# Patient Record
Sex: MALE | Race: WHITE | Employment: OTHER | URBAN - METROPOLITAN AREA
[De-identification: names, ages, dates, MRNs, and addresses within clinical notes are randomized per-mention and may not be internally consistent; named-entity substitution may affect disease eponyms.]

---

## 2018-09-17 ENCOUNTER — HOSPITAL ENCOUNTER (OUTPATIENT)
Age: 58
Setting detail: SPECIMEN
Discharge: HOME | End: 2018-09-17
Payer: COMMERCIAL

## 2018-09-17 LAB
ABSOLUTE EOS #: 0.85 K/UL (ref 0–0.44)
ABSOLUTE IMMATURE GRANULOCYTE: 0.05 K/UL (ref 0–0.3)
ABSOLUTE LYMPH #: 1.53 K/UL (ref 1.1–3.7)
ABSOLUTE MONO #: 1.05 K/UL (ref 0.1–1.2)
ANION GAP SERPL CALCULATED.3IONS-SCNC: 12 MMOL/L (ref 9–17)
BASOPHILS # BLD: 1 % (ref 0–2)
BASOPHILS ABSOLUTE: 0.11 K/UL (ref 0–0.2)
BUN BLDV-MCNC: 6 MG/DL (ref 6–20)
BUN/CREAT BLD: ABNORMAL (ref 9–20)
CALCIUM SERPL-MCNC: 8.9 MG/DL (ref 8.6–10.4)
CHLORIDE BLD-SCNC: 104 MMOL/L (ref 98–107)
CO2: 24 MMOL/L (ref 20–31)
CREAT SERPL-MCNC: 0.61 MG/DL (ref 0.7–1.2)
DIFFERENTIAL TYPE: ABNORMAL
EOSINOPHILS RELATIVE PERCENT: 9 % (ref 1–4)
GFR AFRICAN AMERICAN: >60 ML/MIN
GFR NON-AFRICAN AMERICAN: >60 ML/MIN
GFR SERPL CREATININE-BSD FRML MDRD: ABNORMAL ML/MIN/{1.73_M2}
GFR SERPL CREATININE-BSD FRML MDRD: ABNORMAL ML/MIN/{1.73_M2}
GLUCOSE BLD-MCNC: 73 MG/DL (ref 70–99)
HCT VFR BLD CALC: 31.2 % (ref 40.7–50.3)
HEMOGLOBIN: 9.4 G/DL (ref 13–17)
IMMATURE GRANULOCYTES: 1 %
LYMPHOCYTES # BLD: 16 % (ref 24–43)
MCH RBC QN AUTO: 30.2 PG (ref 25.2–33.5)
MCHC RBC AUTO-ENTMCNC: 30.1 G/DL (ref 28.4–34.8)
MCV RBC AUTO: 100.3 FL (ref 82.6–102.9)
MONOCYTES # BLD: 11 % (ref 3–12)
NRBC AUTOMATED: 0 PER 100 WBC
PDW BLD-RTO: 18.7 % (ref 11.8–14.4)
PLATELET # BLD: 569 K/UL (ref 138–453)
PLATELET ESTIMATE: ABNORMAL
PMV BLD AUTO: 10.8 FL (ref 8.1–13.5)
POTASSIUM SERPL-SCNC: 4.3 MMOL/L (ref 3.7–5.3)
RBC # BLD: 3.11 M/UL (ref 4.21–5.77)
RBC # BLD: ABNORMAL 10*6/UL
SEDIMENTATION RATE, ERYTHROCYTE: 58 MM (ref 0–10)
SEG NEUTROPHILS: 62 % (ref 36–65)
SEGMENTED NEUTROPHILS ABSOLUTE COUNT: 5.98 K/UL (ref 1.5–8.1)
SODIUM BLD-SCNC: 140 MMOL/L (ref 135–144)
WBC # BLD: 9.6 K/UL (ref 3.5–11.3)
WBC # BLD: ABNORMAL 10*3/UL

## 2018-09-17 PROCEDURE — 85651 RBC SED RATE NONAUTOMATED: CPT

## 2018-09-17 PROCEDURE — 86140 C-REACTIVE PROTEIN: CPT

## 2018-09-17 PROCEDURE — 36415 COLL VENOUS BLD VENIPUNCTURE: CPT

## 2018-09-17 PROCEDURE — 85025 COMPLETE CBC W/AUTO DIFF WBC: CPT

## 2018-09-17 PROCEDURE — 80048 BASIC METABOLIC PNL TOTAL CA: CPT

## 2018-09-18 LAB — C-REACTIVE PROTEIN: 39.9 MG/L (ref 0–5)

## 2019-06-28 ENCOUNTER — OFFICE VISIT (OUTPATIENT)
Dept: PODIATRY | Age: 59
End: 2019-06-28
Payer: COMMERCIAL

## 2019-06-28 VITALS
WEIGHT: 148 LBS | DIASTOLIC BLOOD PRESSURE: 81 MMHG | HEIGHT: 66 IN | HEART RATE: 81 BPM | BODY MASS INDEX: 23.78 KG/M2 | SYSTOLIC BLOOD PRESSURE: 138 MMHG

## 2019-06-28 DIAGNOSIS — M77.41 METATARSALGIA OF BOTH FEET: ICD-10-CM

## 2019-06-28 DIAGNOSIS — I73.9 PERIPHERAL VASCULAR DISORDER (HCC): ICD-10-CM

## 2019-06-28 DIAGNOSIS — M79.672 PAIN IN BOTH FEET: ICD-10-CM

## 2019-06-28 DIAGNOSIS — M77.42 METATARSALGIA OF BOTH FEET: ICD-10-CM

## 2019-06-28 DIAGNOSIS — B35.1 ONYCHOMYCOSIS: ICD-10-CM

## 2019-06-28 DIAGNOSIS — L84 CALLUS OF FOOT: Primary | ICD-10-CM

## 2019-06-28 DIAGNOSIS — M79.671 PAIN IN BOTH FEET: ICD-10-CM

## 2019-06-28 PROCEDURE — 3017F COLORECTAL CA SCREEN DOC REV: CPT | Performed by: STUDENT IN AN ORGANIZED HEALTH CARE EDUCATION/TRAINING PROGRAM

## 2019-06-28 PROCEDURE — 99203 OFFICE O/P NEW LOW 30 MIN: CPT | Performed by: STUDENT IN AN ORGANIZED HEALTH CARE EDUCATION/TRAINING PROGRAM

## 2019-06-28 PROCEDURE — 11056 PARNG/CUTG B9 HYPRKR LES 2-4: CPT | Performed by: STUDENT IN AN ORGANIZED HEALTH CARE EDUCATION/TRAINING PROGRAM

## 2019-06-28 PROCEDURE — 11721 DEBRIDE NAIL 6 OR MORE: CPT | Performed by: STUDENT IN AN ORGANIZED HEALTH CARE EDUCATION/TRAINING PROGRAM

## 2019-06-28 PROCEDURE — G8420 CALC BMI NORM PARAMETERS: HCPCS | Performed by: STUDENT IN AN ORGANIZED HEALTH CARE EDUCATION/TRAINING PROGRAM

## 2019-06-28 PROCEDURE — 4004F PT TOBACCO SCREEN RCVD TLK: CPT | Performed by: STUDENT IN AN ORGANIZED HEALTH CARE EDUCATION/TRAINING PROGRAM

## 2019-06-28 PROCEDURE — 99202 OFFICE O/P NEW SF 15 MIN: CPT | Performed by: STUDENT IN AN ORGANIZED HEALTH CARE EDUCATION/TRAINING PROGRAM

## 2019-06-28 PROCEDURE — G8427 DOCREV CUR MEDS BY ELIG CLIN: HCPCS | Performed by: STUDENT IN AN ORGANIZED HEALTH CARE EDUCATION/TRAINING PROGRAM

## 2019-06-28 RX ORDER — FERROUS SULFATE TAB EC 324 MG (65 MG FE EQUIVALENT) 324 (65 FE) MG
TABLET DELAYED RESPONSE ORAL
COMMUNITY
End: 2020-10-23

## 2019-06-28 RX ORDER — ATORVASTATIN CALCIUM 80 MG/1
1 TABLET, FILM COATED ORAL
COMMUNITY
Start: 2017-08-28

## 2019-06-28 RX ORDER — ASPIRIN 81 MG/1
81 TABLET ORAL
COMMUNITY
End: 2020-10-23

## 2019-06-28 RX ORDER — PLANT STANOL ESTER 450 MG
TABLET ORAL
COMMUNITY

## 2019-06-28 RX ORDER — ALBUTEROL SULFATE 90 UG/1
AEROSOL, METERED RESPIRATORY (INHALATION)
COMMUNITY
Start: 2017-08-25 | End: 2020-10-23

## 2019-06-28 RX ORDER — APIXABAN 5 MG/1
TABLET, FILM COATED ORAL
Refills: 1 | COMMUNITY
Start: 2019-05-01

## 2019-06-28 NOTE — PROGRESS NOTES
[address]  [address] [place] [address],  RODOLFO Gutiérrez  Tel: [phone]   Fax: [phone]    Subjective     CC: Right foot pain    HPI:  Brandon Plaza is a 60y.o. year old male who presents to clinic today for right foot pain. Patient states the pain has been around since November and the pain has been getting worse. Patient reports the calluses present on the bottom of the foot have been getting larger. He has been applying a pad to the bottom of his foot over the main callus to relieve pressure but it moves around in his shoes and hasn't been very helpful. Patient has stopped smoking as of a year ago. Previous history of bypass in left leg in November 2018 to restore blood flow. Primary care physician is No primary care provider on file. .    ROS:    Constitutional: Denies nausea, vomiting, fever, chills. Neurologic: Denies numbness, tingling, and burning in the feet. Vascular: Denies symptoms of lower extremity claudication. Skin: Denies open wounds. Otherwise negative except as noted in the HPI. PMH:  No past medical history on file. Surgical History:   No past surgical history on file. Social History:  Social History     Tobacco Use    Smoking status: Not on file   Substance Use Topics    Alcohol use: Not on file    Drug use: Not on file       Medications:  Prior to Admission medications    Medication Sig Start Date End Date Taking?  Authorizing Provider   aspirin 81 MG EC tablet Take 81 mg by mouth   Yes Historical Provider, MD   albuterol sulfate HFA (PROAIR HFA) 108 (90 Base) MCG/ACT inhaler ProAir HFA 90 mcg/actuation aerosol inhaler 8/25/17  Yes Historical Provider, MD CHINCHILLA 5 MG TABS tablet  5/1/19  Yes Historical Provider, MD   ferrous sulfate 324 (65 Fe) MG EC tablet ferrous sulfate 325 mg (65 mg iron) tablet   Yes Historical Provider, MD   potassium gluconate 550 (90 K) MG TABS tablet potassium gluconate 550 mg (90 mg) tablet   Take 1 tablet every day by oral route. Yes Historical Provider, MD   atorvastatin (LIPITOR) 80 MG tablet 1 tablet 8/28/17  Yes Historical Provider, MD   metoprolol tartrate (LOPRESSOR) 25 MG tablet Take by mouth   Yes Historical Provider, MD       Objective     Vitals:    06/28/19 1459   BP: 138/81   Pulse: 81       No results found for: LABA1C    Physical Exam:  General:  Alert and oriented x3. In no acute distress. Lower Extremity Physical Exam:    Vascular: DP and PT pulses are 2/4 palpable on the left foot and 1/4 on the right foot with dopler, Bilateral. CFT <3 seconds to all digits, Bilateral.  No edema, Bilateral.  Hair growth is absent to the level of the digits, Bilateral.     Neuro: Saph/sural/SP/DP/plantar sensation intact to light touch. Protective sensation is intact to 9/10 sites as tested with a 5.07g SWMF, Bilateral.     Musculoskeletal: EHL/FHL/GS/TA gross motor intact  No tenderness on palpation. Gross deformity is absent, Bilateral.  Muscle strength is 5/5 in all 4 compartments bilaterally. Dermatologic: No open lesions, Bilateral.  Interdigital maceration absent, Bilateral.  Nails 1-10 are thickened and dystrophic. Hyperkeratosis present on lateral plantar aspect of distal hallux of right foot. Hyperkeratosis also present at plantar aspect of medial 1st MPJ of left foot. Biomechanical Exam:    Right foot: 1st MTPJ: Loaded:30 Unloaded:45  Right foot: 1st Ray: 8      Right foot: Ankle DF knee extended: 6  Right foot: Ankle DF knee flexed: 8    Left foot: 1st MTPJ: Loaded: 35 Unloaded: 45  Left foot: 1st Ray: 10  Left foot: Ankle DF knee extended: 6  Left foot: Ankle DF knee flexed: 8    Gait Analysis: Early heel lift with abductory twist, bilateral    Assessment   Albert Mills is a 60 y.o. male with     Diagnosis Orders   1. Callus of foot  silver nitrate applicators applicator 1 each    silver nitrate applicators applicator 1 each    TRIM BENIGN HYPERKERATOTIC SKIN LESION,2-4   2.

## 2019-07-12 ENCOUNTER — OFFICE VISIT (OUTPATIENT)
Dept: PODIATRY | Age: 59
End: 2019-07-12
Payer: COMMERCIAL

## 2019-07-12 VITALS
WEIGHT: 148.8 LBS | HEIGHT: 67 IN | DIASTOLIC BLOOD PRESSURE: 77 MMHG | RESPIRATION RATE: 18 BRPM | SYSTOLIC BLOOD PRESSURE: 138 MMHG | HEART RATE: 89 BPM | BODY MASS INDEX: 23.35 KG/M2

## 2019-07-12 DIAGNOSIS — M77.42 METATARSALGIA OF BOTH FEET: ICD-10-CM

## 2019-07-12 DIAGNOSIS — B35.1 ONYCHOMYCOSIS: ICD-10-CM

## 2019-07-12 DIAGNOSIS — M77.41 METATARSALGIA OF BOTH FEET: ICD-10-CM

## 2019-07-12 DIAGNOSIS — M79.672 PAIN IN BOTH FEET: ICD-10-CM

## 2019-07-12 DIAGNOSIS — M79.671 PAIN IN BOTH FEET: ICD-10-CM

## 2019-07-12 DIAGNOSIS — L84 CALLUS OF FOOT: Primary | ICD-10-CM

## 2019-07-12 DIAGNOSIS — I73.9 PERIPHERAL VASCULAR DISORDER (HCC): ICD-10-CM

## 2019-07-12 PROCEDURE — G8428 CUR MEDS NOT DOCUMENT: HCPCS | Performed by: STUDENT IN AN ORGANIZED HEALTH CARE EDUCATION/TRAINING PROGRAM

## 2019-07-12 PROCEDURE — 1036F TOBACCO NON-USER: CPT | Performed by: STUDENT IN AN ORGANIZED HEALTH CARE EDUCATION/TRAINING PROGRAM

## 2019-07-12 PROCEDURE — 99212 OFFICE O/P EST SF 10 MIN: CPT | Performed by: STUDENT IN AN ORGANIZED HEALTH CARE EDUCATION/TRAINING PROGRAM

## 2019-07-12 PROCEDURE — 99213 OFFICE O/P EST LOW 20 MIN: CPT | Performed by: STUDENT IN AN ORGANIZED HEALTH CARE EDUCATION/TRAINING PROGRAM

## 2019-07-12 PROCEDURE — G8420 CALC BMI NORM PARAMETERS: HCPCS | Performed by: STUDENT IN AN ORGANIZED HEALTH CARE EDUCATION/TRAINING PROGRAM

## 2019-07-12 PROCEDURE — 3017F COLORECTAL CA SCREEN DOC REV: CPT | Performed by: STUDENT IN AN ORGANIZED HEALTH CARE EDUCATION/TRAINING PROGRAM

## 2019-07-12 PROCEDURE — 11056 PARNG/CUTG B9 HYPRKR LES 2-4: CPT | Performed by: STUDENT IN AN ORGANIZED HEALTH CARE EDUCATION/TRAINING PROGRAM

## 2019-07-12 RX ORDER — CLOBETASOL PROPIONATE 0.46 MG/ML
SOLUTION TOPICAL
COMMUNITY
End: 2019-10-11 | Stop reason: SDUPTHER

## 2019-07-12 RX ORDER — PNV NO.95/FERROUS FUM/FOLIC AC 28MG-0.8MG
TABLET ORAL
Refills: 3 | COMMUNITY
Start: 2019-05-30

## 2019-07-12 RX ORDER — NAPROXEN 500 MG/1
TABLET ORAL
COMMUNITY
Start: 2017-07-08

## 2019-07-12 RX ORDER — CLOBETASOL PROPIONATE 0.46 MG/ML
SOLUTION TOPICAL
Refills: 4 | COMMUNITY
Start: 2019-07-09 | End: 2019-10-11 | Stop reason: SDUPTHER

## 2019-07-12 RX ORDER — PANTOPRAZOLE SODIUM 40 MG/1
TABLET, DELAYED RELEASE ORAL
Refills: 3 | COMMUNITY
Start: 2019-04-23

## 2019-07-12 RX ORDER — METRONIDAZOLE 500 MG/1
TABLET ORAL
Refills: 0 | COMMUNITY
Start: 2019-04-12

## 2019-07-12 RX ORDER — CLOPIDOGREL BISULFATE 75 MG/1
TABLET ORAL
Refills: 2 | COMMUNITY
Start: 2019-04-06 | End: 2020-10-23

## 2019-07-12 RX ORDER — BACITRACIN ZINC, NEOMYCIN SULFATE, AND POLYMYXIN B SULFATE 400; 3.5; 5 [IU]/G; MG/G; [IU]/G
OINTMENT TOPICAL
Refills: 0 | COMMUNITY
Start: 2019-04-12

## 2019-07-12 RX ORDER — SULFAMETHOXAZOLE AND TRIMETHOPRIM 800; 160 MG/1; MG/1
TABLET ORAL
Refills: 5 | COMMUNITY
Start: 2019-07-08 | End: 2020-10-23

## 2019-07-12 RX ORDER — UREA 200 MG/G
GEL TOPICAL
Qty: 1 CONTAINER | Refills: 1 | Status: SHIPPED | OUTPATIENT
Start: 2019-07-12

## 2019-07-12 RX ORDER — LEVOFLOXACIN 500 MG/1
TABLET, FILM COATED ORAL
COMMUNITY

## 2019-07-12 RX ORDER — TETRACYCLINE HYDROCHLORIDE 500 MG/1
CAPSULE ORAL
Refills: 0 | COMMUNITY
Start: 2019-04-12

## 2019-07-12 RX ORDER — CERAMIDES 1,3,6-II
CREAM (GRAM) TOPICAL
COMMUNITY

## 2019-07-12 RX ORDER — SULFAMETHOXAZOLE AND TRIMETHOPRIM 800; 160 MG/1; MG/1
TABLET ORAL
COMMUNITY

## 2019-07-12 RX ORDER — HYDROXYZINE HYDROCHLORIDE 25 MG/1
TABLET, FILM COATED ORAL
Refills: 3 | COMMUNITY
Start: 2019-07-09

## 2019-07-12 RX ORDER — ASPIRIN 325 MG
TABLET ORAL
COMMUNITY

## 2019-07-12 RX ORDER — BUDESONIDE AND FORMOTEROL FUMARATE DIHYDRATE 160; 4.5 UG/1; UG/1
AEROSOL RESPIRATORY (INHALATION)
COMMUNITY
Start: 2017-07-28 | End: 2019-10-11 | Stop reason: SDUPTHER

## 2019-07-12 RX ORDER — CLOPIDOGREL BISULFATE 75 MG/1
TABLET ORAL
COMMUNITY
Start: 2017-07-05

## 2019-07-12 RX ORDER — ALBUTEROL SULFATE 90 UG/1
AEROSOL, METERED RESPIRATORY (INHALATION)
COMMUNITY

## 2019-07-12 NOTE — PROGRESS NOTES
[address]  [address] [facility] [address] [facility], [address] [facility]  Tel: [phone]   Fax: [phone]    Subjective     CC: Right foot pain    HPI:  Simran Rebollar is a 60y.o. year old male who presents to clinic today for right foot pain. Patient states the pain has been around since November and the pain has been getting worse. Patient reports the calluses present on the bottom of the foot have been getting larger. He has been applying a pad to the bottom of his foot over the main callus to relieve pressure but it moves around in his shoes and hasn't been very helpful. Patient has stopped smoking as of a year ago. Previous history of bypass in left leg in November 2018 to restore blood flow. Primary care physician is No primary care provider on file. .    Interval history 7/12      ROS:    Constitutional: Denies nausea, vomiting, fever, chills. Neurologic: Denies numbness, tingling, and burning in the feet. Vascular: Denies symptoms of lower extremity claudication. Skin: Denies open wounds. Otherwise negative except as noted in the HPI. PMH:  No past medical history on file. Surgical History:   No past surgical history on file. Social History:  Social History     Tobacco Use    Smoking status: Former Smoker    Smokeless tobacco: Never Used   Substance Use Topics    Alcohol use: Not on file    Drug use: Not on file       Medications:  Prior to Admission medications    Medication Sig Start Date End Date Taking? Authorizing Provider   budesonide-formoterol [facility]) 160-4.5 MCG/ACT AERO  7/28/17  Yes Historical Provider, MD   clopidogrel (PLAVIX) 75 MG tablet clopidogrel 75 mg tablet 7/5/17  Yes Historical Provider, MD   ipratropium (ATROVENT HFA) 17 MCG/ACT inhaler  8/25/17  Yes Historical Provider, MD   naproxen (NAPROSYN) 500 MG tablet  7/8/17  Yes Historical Provider, MD   urea (CARMOL) 20 % cream Apply topically as needed.  7/12/19  Yes Yovani Beltran DPM   aspirin ProAir HFA 90 mcg/actuation aerosol inhaler 8/25/17   Historical Provider, MD   ELIQUIS 5 MG TABS tablet  5/1/19   Historical Provider, MD   ferrous sulfate 324 (65 Fe) MG EC tablet ferrous sulfate 325 mg (65 mg iron) tablet    Historical Provider, MD   potassium gluconate 550 (90 K) MG TABS tablet potassium gluconate 550 mg (90 mg) tablet   Take 1 tablet every day by oral route. Historical Provider, MD   atorvastatin (LIPITOR) 80 MG tablet 1 tablet 8/28/17   Historical Provider, MD   metoprolol tartrate (LOPRESSOR) 25 MG tablet Take by mouth    Historical Provider, MD       Objective     Vitals:    07/12/19 1521   BP: 138/77   Pulse: 89   Resp: 18       No results found for: LABA1C    Physical Exam:  General:  Alert and oriented x3. In no acute distress. Lower Extremity Physical Exam:    Vascular: DP and PT pulses are 2/4 palpable on the left foot and 1/4 on the right foot with dopler, Bilateral. CFT <3 seconds to all digits, Bilateral.  No edema, Bilateral.  Hair growth is absent to the level of the digits, Bilateral.     Neuro: Saph/sural/SP/DP/plantar sensation intact to light touch. Protective sensation is intact to 9/10 sites as tested with a 5.07g SWMF, Bilateral.     Musculoskeletal: EHL/FHL/GS/TA gross motor intact  No tenderness on palpation. Gross deformity is absent, Bilateral.  Muscle strength is 5/5 in all 4 compartments bilaterally. Dermatologic: No open lesions, Bilateral.  Interdigital maceration absent, Bilateral.  Nails 1-10 are trimmed thickened and dystrophic. Hyperkeratosis present on lateral plantar aspect of distal hallux of right foot. Hyperkeratosis also present at plantar aspect of medial 1st MPJ of left foot. No erythema fluctuance or purulence expressed no signs of infection noted. Assessment   Chris Lindo is a 60 y.o. male with     Diagnosis Orders   1. Callus of foot  XR FOOT RIGHT (MIN 3 VIEWS)    TRIM BENIGN HYPERKERATOTIC SKIN LESION,2-4   2.  Peripheral

## 2019-07-12 NOTE — PROGRESS NOTES
Patient instructed to remove shoes and socks, instructed to sit in exam chair. Current PCP name is NONE and date of last visit NA. Do you have a follow up visit scheduled?   Yes or no    If yes the date is NO

## 2019-09-18 ENCOUNTER — HOSPITAL ENCOUNTER (OUTPATIENT)
Dept: GENERAL RADIOLOGY | Age: 59
Discharge: HOME | End: 2019-09-20
Payer: COMMERCIAL

## 2019-09-18 ENCOUNTER — HOSPITAL ENCOUNTER (OUTPATIENT)
Age: 59
Discharge: HOME | End: 2019-09-20
Payer: COMMERCIAL

## 2019-09-18 DIAGNOSIS — M79.672 PAIN IN BOTH FEET: ICD-10-CM

## 2019-09-18 DIAGNOSIS — M77.42 METATARSALGIA OF BOTH FEET: ICD-10-CM

## 2019-09-18 DIAGNOSIS — I73.9 PERIPHERAL VASCULAR DISORDER (HCC): ICD-10-CM

## 2019-09-18 DIAGNOSIS — L84 CALLUS OF FOOT: ICD-10-CM

## 2019-09-18 DIAGNOSIS — M77.41 METATARSALGIA OF BOTH FEET: ICD-10-CM

## 2019-09-18 DIAGNOSIS — M79.671 PAIN IN BOTH FEET: ICD-10-CM

## 2019-09-18 PROCEDURE — 73630 X-RAY EXAM OF FOOT: CPT

## 2019-10-11 ENCOUNTER — OFFICE VISIT (OUTPATIENT)
Dept: PODIATRY | Age: 59
End: 2019-10-11
Payer: COMMERCIAL

## 2019-10-11 VITALS
HEART RATE: 98 BPM | DIASTOLIC BLOOD PRESSURE: 76 MMHG | BODY MASS INDEX: 23.63 KG/M2 | WEIGHT: 147 LBS | HEIGHT: 66 IN | SYSTOLIC BLOOD PRESSURE: 137 MMHG

## 2019-10-11 DIAGNOSIS — M79.671 PAIN IN BOTH FEET: ICD-10-CM

## 2019-10-11 DIAGNOSIS — I73.9 PERIPHERAL VASCULAR DISORDER (HCC): ICD-10-CM

## 2019-10-11 DIAGNOSIS — B35.1 ONYCHOMYCOSIS: ICD-10-CM

## 2019-10-11 DIAGNOSIS — M77.41 METATARSALGIA OF BOTH FEET: ICD-10-CM

## 2019-10-11 DIAGNOSIS — M79.672 PAIN IN BOTH FEET: ICD-10-CM

## 2019-10-11 DIAGNOSIS — L84 CALLUS OF FOOT: Primary | ICD-10-CM

## 2019-10-11 DIAGNOSIS — M77.42 METATARSALGIA OF BOTH FEET: ICD-10-CM

## 2019-10-11 PROCEDURE — G8484 FLU IMMUNIZE NO ADMIN: HCPCS | Performed by: STUDENT IN AN ORGANIZED HEALTH CARE EDUCATION/TRAINING PROGRAM

## 2019-10-11 PROCEDURE — G8428 CUR MEDS NOT DOCUMENT: HCPCS | Performed by: STUDENT IN AN ORGANIZED HEALTH CARE EDUCATION/TRAINING PROGRAM

## 2019-10-11 PROCEDURE — 11056 PARNG/CUTG B9 HYPRKR LES 2-4: CPT | Performed by: STUDENT IN AN ORGANIZED HEALTH CARE EDUCATION/TRAINING PROGRAM

## 2019-10-11 PROCEDURE — G8420 CALC BMI NORM PARAMETERS: HCPCS | Performed by: STUDENT IN AN ORGANIZED HEALTH CARE EDUCATION/TRAINING PROGRAM

## 2019-10-11 PROCEDURE — 3017F COLORECTAL CA SCREEN DOC REV: CPT | Performed by: STUDENT IN AN ORGANIZED HEALTH CARE EDUCATION/TRAINING PROGRAM

## 2019-10-11 PROCEDURE — 11721 DEBRIDE NAIL 6 OR MORE: CPT | Performed by: STUDENT IN AN ORGANIZED HEALTH CARE EDUCATION/TRAINING PROGRAM

## 2019-10-11 PROCEDURE — 99212 OFFICE O/P EST SF 10 MIN: CPT | Performed by: STUDENT IN AN ORGANIZED HEALTH CARE EDUCATION/TRAINING PROGRAM

## 2019-10-11 PROCEDURE — 1036F TOBACCO NON-USER: CPT | Performed by: STUDENT IN AN ORGANIZED HEALTH CARE EDUCATION/TRAINING PROGRAM

## 2019-12-20 NOTE — PATIENT INSTRUCTIONS
Patient Education        Corns and Calluses: Care Instructions  Your Care Instructions  Corns and calluses are areas of thick, hard, dead skin. They form to protect your skin from injury. Corns usually form where toes rub together. Calluses often form on the hands or feet. They may form wherever the skin rubs against something, such as shoes. In most cases, you can take steps at home to care for a corn or callus. Follow-up care is a key part of your treatment and safety. Be sure to make and go to all appointments, and call your doctor if you are having problems. It's also a good idea to know your test results and keep a list of the medicines you take. How can you care for yourself at home? · Wear shoes and other footwear that fit correctly and are roomy. This will reduce rubbing and give corns or calluses time to heal.  · Use protective pads, such as moleskin, to cushion the callus or corn. · Soften the corn or callus and remove the dead skin by using over-the-counter products such as salicylic acid. If you have a condition that causes problems with blood flow (such as peripheral vascular disease) or loss of feeling in your feet (such as diabetes), talk to your doctor before you try any home treatment. · Soak your corn or callus in warm water, and then use a pumice stone to rub dead skin away. · Wash your feet regularly, and rub lotion into your feet while they are still moist. Dry skin can cause a callus to crack and bleed. · Never cut the corn or callus yourself, especially if you have problems with blood flow to your legs or feet. When should you call for help? Call your doctor now or seek immediate medical care if:    · You have signs of infection, such as:  ? Increased pain, swelling, warmth, or redness around the corn or callus. ? Red streaks leading from the corn or callus. ? Pus draining from the corn or callus.   ? A fever.    Watch closely for changes in your health, and be sure to contact your doctor if:    · You do not get better as expected. Where can you learn more? Go to [email or web address] and sign in to your [employer] account. Enter R244 in the [facility] box to learn more about \"Corns and Calluses: Care Instructions. \"     If you do not have an account, please click on the \"Sign Up Now\" link. Current as of: April 1, 2019  Content Version: 12.1  © 2354-2050 [employer]wise, Incorporated. Care instructions adapted under license by [employer]. If you have questions about a medical condition or this instruction, always ask your healthcare professional. [address] any warranty or liability for your use of this information.

## 2020-01-17 ENCOUNTER — OFFICE VISIT (OUTPATIENT)
Dept: PODIATRY | Age: 60
End: 2020-01-17
Payer: COMMERCIAL

## 2020-01-17 VITALS
BODY MASS INDEX: 23.11 KG/M2 | SYSTOLIC BLOOD PRESSURE: 151 MMHG | HEIGHT: 66 IN | DIASTOLIC BLOOD PRESSURE: 72 MMHG | WEIGHT: 143.8 LBS | HEART RATE: 87 BPM

## 2020-01-17 PROCEDURE — 99999 PR OFFICE/OUTPT VISIT,PROCEDURE ONLY: CPT | Performed by: STUDENT IN AN ORGANIZED HEALTH CARE EDUCATION/TRAINING PROGRAM

## 2020-01-17 PROCEDURE — 99212 OFFICE O/P EST SF 10 MIN: CPT | Performed by: STUDENT IN AN ORGANIZED HEALTH CARE EDUCATION/TRAINING PROGRAM

## 2020-01-17 PROCEDURE — 11721 DEBRIDE NAIL 6 OR MORE: CPT | Performed by: STUDENT IN AN ORGANIZED HEALTH CARE EDUCATION/TRAINING PROGRAM

## 2020-01-17 NOTE — PROGRESS NOTES
[address],  S Yobani Gutiérrez  Tel: [phone]   Fax: [phone]    Subjective     CC: Diabetic foot exam and painful and elongated toe nails    HPI:  Aarti Resendez is a 60y.o. year old male who presents to clinic today for diabetic foot examination and also complaining of painful and elongated toenails. The patient is a diabetic. The patient states their digits are painful when the toenails are elongated, causing rubbing in shoe gear. The patient denies tingling and numbness in the lower extremities. Of note the patient has history of PVD status post revascularization bilaterally, by his vascular surgeon in the [address] system. The patient denies any history of acute trauma. The patient denies any other pedal complaints. Primary care physician is [place] Yeny Gutiérrez.    ROS:    Constitutional: Denies nausea, vomiting, fever, chills. Neurologic: Denies numbness, tingling, and burning in the feet. Vascular: Denies symptoms of lower extremity claudication. Skin: Denies open wounds. Otherwise negative except as noted in the HPI. PMH:  No past medical history on file. Surgical History:   No past surgical history on file. Social History:  Social History     Tobacco Use    Smoking status: Former Smoker    Smokeless tobacco: Never Used   Substance Use Topics    Alcohol use: Not on file    Drug use: Not on file       Medications:  Prior to Admission medications    Medication Sig Start Date End Date Taking?  Authorizing Provider   aspirin (DEIRDRE ASPIRIN) 325 MG tablet Deirdre Aspirin   81 mg once daily    Historical Provider, MD   Emosully (Víctor Flores) CHAN be    Historical Provider, MD   EQ COKNEGB ZJSSBE 149 MG/15ML suspension  4/12/19   Historical Provider, MD   clopidogrel (PLAVIX) 75 MG tablet clopidogrel 75 mg tablet 7/5/17   Historical Provider, MD   clopidogrel (PLAVIX) 75 MG tablet  4/6/19   Historical Provider, MD [x]Edema   []Paresthesia   []Burning    Q Modifier Met: Q9: One Class B and two Class C findings    Assessment   Yojana Lambert is a 60 y.o. male with     Diagnosis Orders   1. Onychomycosis   DIABETES FOOT EXAM    OR DEBRIDEMENT OF NAILS, 6 OR MORE   2. Peripheral vascular disorder (HCC)   DIABETES FOOT EXAM    OR DEBRIDEMENT OF NAILS, 6 OR MORE   3. Toe pain, bilateral   DIABETES FOOT EXAM    OR DEBRIDEMENT OF NAILS, 6 OR MORE        Plan   Patient examined and evaluated. Diagnosis and treatment options discussed in detail. Nails 1-10 debrided sharply with sterile nail nippers without incident. Dispensed silicone toe sleeve to the patient to help alleviate pain from rubbing in his shoe gear secondary to his hammer toe deformity. Patient was educated on the utmost importance of tight glycemic control. Patient was advised to continue daily foot checks, in addition to not ambulating barefoot. Patient to RTC in 3 months. Please, call the office with any questions or concerns     No orders of the defined types were placed in this encounter.     Orders Placed This Encounter   Procedures     DIABETES FOOT EXAM    OR DEBRIDEMENT OF NAILS, [address] [place] [address]   Podiatric Medicine & Surgery   1/17/2020 at 1:09 PM

## 2020-05-15 ENCOUNTER — OFFICE VISIT (OUTPATIENT)
Dept: PODIATRY | Age: 60
End: 2020-05-15
Payer: COMMERCIAL

## 2020-05-15 VITALS
BODY MASS INDEX: 23.78 KG/M2 | DIASTOLIC BLOOD PRESSURE: 76 MMHG | HEART RATE: 86 BPM | SYSTOLIC BLOOD PRESSURE: 155 MMHG | HEIGHT: 66 IN | TEMPERATURE: 97.6 F | WEIGHT: 148 LBS

## 2020-05-15 PROCEDURE — 11055 PARING/CUTG B9 HYPRKER LES 1: CPT | Performed by: STUDENT IN AN ORGANIZED HEALTH CARE EDUCATION/TRAINING PROGRAM

## 2020-05-15 PROCEDURE — 11721 DEBRIDE NAIL 6 OR MORE: CPT | Performed by: STUDENT IN AN ORGANIZED HEALTH CARE EDUCATION/TRAINING PROGRAM

## 2020-05-15 NOTE — PROGRESS NOTES
gluconate 550 mg (90 mg) tablet   Take 1 tablet every day by oral route. Yes Historical Provider, MD   atorvastatin (LIPITOR) 80 MG tablet 1 tablet 8/28/17  Yes Historical Provider, MD   metoprolol tartrate (LOPRESSOR) 25 MG tablet Take by mouth   Yes Historical Provider, MD       Objective     Vitals:    05/15/20 1502   BP: (!) 155/76   Pulse: 86   Temp:        No results found for: LABA1C    Physical Exam:  General:  Alert and oriented x3. In no acute distress. Lower Extremity Physical Exam:    Vascular: DP and PT pulses are nonpalpable, Bilateral. CFT <5 seconds to all digits, Bilateral.  No edema, Bilateral.  Hair growth is absent to the level of the digits, Bilateral. Skin temperature is cool to bilateral lower extremities. Neuro: Saph/sural/SP/DP/plantar sensation intact to light touch. Protective sensation is intact to 9/10 sites as tested with a 5.07g SWMF, Bilateral.     Musculoskeletal: EHL/FHL/GS/TA gross motor intact. TTP to distal digits b/l. Gross deformity is nonreducible hammertoe deformity digits 2 through 4 bilateral.    Dermatologic: No open lesions, Bilateral. Skin is atrophic, Bilateral. Interdigital maceration absent, Bilateral.  Nails 1-10 are thickened with subungual debris noted, in addition to being elongated and dystrophic. Focal hyperkeratotic tissue noted to the lateral plantar aspect of the distal hallux on the right. Assessment   Aníbal Scales is a 60 y.o. male with     Diagnosis Orders   1. Dermatophytosis of nail  [address] - MI DEBRIDEMENT OF NAILS, 6 OR MORE   2. Peripheral vascular disorder (HCC)  MI TRIM HYPERKERATOTIC SKIN LESION, ONE   3. Callus of foot  MI TRIM HYPERKERATOTIC SKIN LESION, ONE   4. Pain in both feet  MI TRIM HYPERKERATOTIC SKIN LESION, ONE        Plan   Patient examined and evaluated. Diagnosis and treatment options discussed in detail. Nails 1-10 debrided sharply with sterile nail nippers without incident.   Callus was debrided using #15

## 2020-10-21 NOTE — PATIENT INSTRUCTIONS
Patient Education        Corns and Calluses: Care Instructions  Your Care Instructions  Corns and calluses are areas of thick, hard, dead skin. They form to protect your skin from injury. Corns usually form where toes rub together. Calluses often form on the hands or feet. They may form wherever the skin rubs against something, such as shoes. In most cases, you can take steps at home to care for a corn or callus. Follow-up care is a key part of your treatment and safety. Be sure to make and go to all appointments, and call your doctor if you are having problems. It's also a good idea to know your test results and keep a list of the medicines you take. How can you care for yourself at home? · Wear shoes and other footwear that fit correctly and are roomy. This will reduce rubbing and give corns or calluses time to heal.  · Use protective pads, such as moleskin, to cushion the callus or corn. · Soften the corn or callus and remove the dead skin by using over-the-counter products such as salicylic acid. If you have a condition that causes problems with blood flow (such as peripheral vascular disease) or loss of feeling in your feet (such as diabetes), talk to your doctor before you try any home treatment. · Soak your corn or callus in warm water, and then use a pumice stone to rub dead skin away. · Wash your feet regularly, and rub lotion into your feet while they are still moist. Dry skin can cause a callus to crack and bleed. · Never cut the corn or callus yourself, especially if you have problems with blood flow to your legs or feet. When should you call for help? Call your doctor now or seek immediate medical care if:    · You have signs of infection, such as:  ? Increased pain, swelling, warmth, or redness around the corn or callus. ? Red streaks leading from the corn or callus. ? Pus draining from the corn or callus. ? A fever.    Watch closely for changes in your health, and be sure to contact your

## 2020-10-23 ENCOUNTER — OFFICE VISIT (OUTPATIENT)
Dept: PODIATRY | Age: 60
End: 2020-10-23
Payer: COMMERCIAL

## 2020-10-23 VITALS
SYSTOLIC BLOOD PRESSURE: 145 MMHG | WEIGHT: 149 LBS | BODY MASS INDEX: 23.39 KG/M2 | HEART RATE: 82 BPM | DIASTOLIC BLOOD PRESSURE: 81 MMHG | RESPIRATION RATE: 16 BRPM | HEIGHT: 67 IN | TEMPERATURE: 96.8 F

## 2020-10-23 PROCEDURE — 11721 DEBRIDE NAIL 6 OR MORE: CPT | Performed by: STUDENT IN AN ORGANIZED HEALTH CARE EDUCATION/TRAINING PROGRAM

## 2020-10-23 PROCEDURE — 99212 OFFICE O/P EST SF 10 MIN: CPT | Performed by: STUDENT IN AN ORGANIZED HEALTH CARE EDUCATION/TRAINING PROGRAM

## 2020-10-23 PROCEDURE — 1036F TOBACCO NON-USER: CPT | Performed by: STUDENT IN AN ORGANIZED HEALTH CARE EDUCATION/TRAINING PROGRAM

## 2020-10-23 PROCEDURE — 3017F COLORECTAL CA SCREEN DOC REV: CPT | Performed by: STUDENT IN AN ORGANIZED HEALTH CARE EDUCATION/TRAINING PROGRAM

## 2020-10-23 PROCEDURE — G8427 DOCREV CUR MEDS BY ELIG CLIN: HCPCS | Performed by: STUDENT IN AN ORGANIZED HEALTH CARE EDUCATION/TRAINING PROGRAM

## 2020-10-23 PROCEDURE — G8420 CALC BMI NORM PARAMETERS: HCPCS | Performed by: STUDENT IN AN ORGANIZED HEALTH CARE EDUCATION/TRAINING PROGRAM

## 2020-10-23 PROCEDURE — 99213 OFFICE O/P EST LOW 20 MIN: CPT | Performed by: STUDENT IN AN ORGANIZED HEALTH CARE EDUCATION/TRAINING PROGRAM

## 2020-10-23 PROCEDURE — G8484 FLU IMMUNIZE NO ADMIN: HCPCS | Performed by: STUDENT IN AN ORGANIZED HEALTH CARE EDUCATION/TRAINING PROGRAM

## 2020-10-23 NOTE — PROGRESS NOTES
[address]  [address] [facility] 2000 [facility], [address] [facility]  Tel: [phone]   Fax: [phone]    Subjective     CC: Elongated toenails    HPI:  Aurelia Sanches is a 60y.o. year old male who presents to clinic today complaining of painful and elongated toenails. The patient states their digits are painful when the toenails are elongated, causing rubbing in shoe gear. The patient denies tingling and numbness in the lower extremities. Of note the patient has history of PVD status post revascularization bilaterally, by his vascular surgeon in the [facility] system. Patient states he self debrided his callus on his right foot with a tremor. The patient denies any history of acute trauma. The patient denies any other pedal complaints. Primary care physician is [employer] [place], [place].    ROS:    Constitutional: Denies nausea, vomiting, fever, chills. Neurologic: Denies numbness, tingling, and burning in the feet. Vascular: Denies symptoms of lower extremity claudication. Skin: Denies open wounds. Otherwise negative except as noted in the HPI. PMH:  No past medical history on file. Surgical History:   No past surgical history on file. Social History:  Social History     Tobacco Use    Smoking status: Former Smoker    Smokeless tobacco: Never Used    Tobacco comment: quit 1 year ago   Substance Use Topics    Alcohol use: Not on file    Drug use: Not on file       Medications:  Prior to Admission medications    Medication Sig Start Date End Date Taking?  Authorizing Provider   aspirin (DEIRDRE ASPIRIN) 325 MG tablet Deirdre Aspirin   81 mg once daily   Yes Historical Provider, MD Swanson (Deanna Cisneros) CREMANA cerave       cre   Yes Historical Provider, MD   EQ STOMACH RELIEF 262 MG/15ML suspension  4/12/19  Yes Historical Provider, MD   clopidogrel (PLAVIX) 75 MG tablet clopidogrel 75 mg tablet 7/5/17  Yes Historical Provider, MD   hydrOXYzine (ATARAX) 25 MG tablet TAKE 1 TABLET BY digits, Bilateral.     Neuro: Saph/sural/SP/DP/plantar sensation intact to light touch. Protective sensation is intact to 10/10 sites as tested with a 5.07g SWMF, Bilateral.     Musculoskeletal: EHL/FHL/GS/TA gross motor intact. Gross deformity is nonreducible hammertoe deformity digits 2 through 4 bilateral.    Dermatologic: No open lesions, Bilateral. Skin is atrophic, Bilateral. Interdigital maceration absent, Bilateral.  Nails 1-10 are thickened with subungual debris noted, in addition to being elongated and dystrophic. Focal hyperkeratotic tissue noted to the lateral plantar aspect of the distal hallux on the right. Focal hyperkeratotic tissue also present at the plantar aspect of the medial first MTPJ, on the left. Assessment   Slade Brunner is a 60 y.o. male with     Diagnosis Orders   1. Onychomycosis     2. Peripheral vascular disorder ([facility] [address]     3. Callus of foot          Plan   Patient examined and evaluated. Diagnosis and treatment options discussed in detail. Nails 1-10 debrided sharply with sterile nail nippers without incident. Patient to RTC in 3 months. Please, call the office with any questions or concerns     No orders of the defined types were placed in this encounter. No orders of the defined types were placed in this encounter.       Jenny Minaya DPM   Podiatric Medicine & Surgery   10/23/2020 at 2:08 PM

## 2020-10-23 NOTE — PROGRESS NOTES
Patient instructed to remove shoes and socks and instructed to sit in exam chair. Current PCP is Jason Tripp and date of last visit was 8/2020. No upcoming appointment at this time.

## 2021-02-19 ENCOUNTER — OFFICE VISIT (OUTPATIENT)
Dept: PODIATRY | Age: 61
End: 2021-02-19
Payer: COMMERCIAL

## 2021-02-19 VITALS
DIASTOLIC BLOOD PRESSURE: 73 MMHG | HEART RATE: 99 BPM | SYSTOLIC BLOOD PRESSURE: 136 MMHG | WEIGHT: 145 LBS | BODY MASS INDEX: 22.76 KG/M2 | HEIGHT: 67 IN

## 2021-02-19 DIAGNOSIS — M79.674 TOE PAIN, BILATERAL: ICD-10-CM

## 2021-02-19 DIAGNOSIS — L84 CALLUS OF FOOT: ICD-10-CM

## 2021-02-19 DIAGNOSIS — M79.675 TOE PAIN, BILATERAL: ICD-10-CM

## 2021-02-19 DIAGNOSIS — B35.1 ONYCHOMYCOSIS: Primary | ICD-10-CM

## 2021-02-19 PROCEDURE — 99212 OFFICE O/P EST SF 10 MIN: CPT | Performed by: STUDENT IN AN ORGANIZED HEALTH CARE EDUCATION/TRAINING PROGRAM

## 2021-02-19 PROCEDURE — 11056 PARNG/CUTG B9 HYPRKR LES 2-4: CPT | Performed by: STUDENT IN AN ORGANIZED HEALTH CARE EDUCATION/TRAINING PROGRAM

## 2021-02-19 PROCEDURE — 11720 DEBRIDE NAIL 1-5: CPT | Performed by: PODIATRIST

## 2021-02-19 PROCEDURE — 11720 DEBRIDE NAIL 1-5: CPT | Performed by: STUDENT IN AN ORGANIZED HEALTH CARE EDUCATION/TRAINING PROGRAM

## 2021-02-19 PROCEDURE — 99999 PR OFFICE/OUTPT VISIT,PROCEDURE ONLY: CPT | Performed by: STUDENT IN AN ORGANIZED HEALTH CARE EDUCATION/TRAINING PROGRAM

## 2021-02-19 NOTE — PROGRESS NOTES
Patient instructed to remove shoes and socks, instructed to sit in exam chair. Current PCP name is Vernon Flores and date of last visit 12/10/2020.  Do you have a follow up visit scheduled? no    If yes the date is

## 2021-02-19 NOTE — PROGRESS NOTES
[address]  [address] [place] [address], [place] Guti[place]rez  Tel: [phone]   Fax: [phone]    Subjective     CC: Elongated toenails and calluses     HPI:  Ben Thomas is a 63y.o. year old male who presents to clinic today complaining of painful, elongated toenails and calluses. The patient states their digits are painful when the toenails are elongated, causing rubbing in shoe gear. He also states the calluses on his feet are painful so he uses a dremel at home to help reduce their thickness. The patient denies tingling and numbness in the lower extremities. Of note the patient has history of PVD status post revascularization bilaterally, by his vascular surgeon in the [address] system. The patient denies any history of acute trauma. The patient denies any other pedal complaints. Primary care physician is [place], [place].    ROS:    Constitutional: Denies nausea, vomiting, fever, chills. Neurologic: Denies numbness, tingling, and burning in the feet. Vascular: Denies symptoms of lower extremity claudication. Skin: Denies open wounds. Otherwise negative except as noted in the HPI. PMH:  No past medical history on file. Surgical History:   No past surgical history on file. Social History:  Social History     Tobacco Use    Smoking status: Former Smoker     Quit date: 2020     Years since quittin.1    Smokeless tobacco: Never Used    Tobacco comment: quit 1 year ago   Substance Use Topics    Alcohol use: Not on file    Drug use: Never       Medications:  Prior to Admission medications    Medication Sig Start Date End Date Taking?  Authorizing Provider   aspirin (PRAVIN ASPIRIN) 325 MG tablet Pravin Aspirin   81 mg once daily   Yes Historical Provider, MD   Emollient (Anuel Reese) CREA cerave       cre   Yes Historical Provider, MD ROPER STOMACH RELIEF 262 MG/15ML suspension  19  Yes Historical Provider, MD   clopidogrel (PLAVIX) 75 MG tablet clopidogrel 75 mg tablet 7/5/17  Yes Historical Provider, MD   hydrOXYzine (ATARAX) 25 MG tablet TAKE 1 TABLET BY MOUTH TWICE DAILY AS NEEDED FOR ITCHING 7/9/19  Yes Historical Provider, MD   ipratropium (ATROVENT HFA) 17 MCG/ACT inhaler  8/25/17  Yes Historical Provider, MD   levofloxacin (LEVAQUIN) 500 MG tablet levofloxacin 500 mg tablet   Yes Historical Provider, MD   metroNIDAZOLE (FLAGYL) 500 MG tablet  4/12/19  Yes Historical Provider, MD   naproxen (NAPROSYN) 500 MG tablet  7/8/17  Yes Historical Provider, MD   pantoprazole (PROTONIX) 40 MG tablet  4/23/19  Yes Historical Provider, MD   sulfamethoxazole-trimethoprim (BACTRIM DS;SEPTRA DS) 800-160 MG per tablet sulfamethoxazole 800 mg-trimethoprim 160 mg tablet   Yes Historical Provider, MD   tetracycline (ACHROMYCIN;SUMYCIN) 500 MG capsule  4/12/19  Yes Historical Provider, MD   albuterol sulfate HFA (PROAIR HFA) 108 (90 Base) MCG/ACT inhaler ProAir HFA 90 mcg/actuation aerosol inhaler   INHALE 2 PUFFS BY MOUTH EVERY 4 HOURS AS NEEDED   Yes Historical Provider, MD   Ferrous Sulfate (IRON) 325 (65 Fe) MG TABS TAKE 1 TABLET BY MOUTH ONCE DAILY WITH MEALS FOR 30 DAYS 5/30/19  Yes Historical Provider, MD   urea (CARMOL) 20 % cream Apply topically as needed. 7/12/19  Yes Mia Watters DPM   ELIQUIS 5 MG TABS tablet  5/1/19  Yes Historical Provider, MD   potassium gluconate 550 (90 K) MG TABS tablet potassium gluconate 550 mg (90 mg) tablet   Take 1 tablet every day by oral route. Yes Historical Provider, MD   atorvastatin (LIPITOR) 80 MG tablet 1 tablet 8/28/17  Yes Historical Provider, MD   metoprolol tartrate (LOPRESSOR) 25 MG tablet Take by mouth   Yes Historical Provider, MD       Objective     Vitals:    02/19/21 1247   BP: 136/73   Pulse: 99       No results found for: LABA1C    Physical Exam:  General:  Alert and oriented x3. In no acute distress.      Lower Extremity Physical Exam:    Vascular: DP and PT pulses are not palpable, Bilateral. CFT <3 seconds to all digits, Bilateral.  No edema, Bilateral.  Hair growth is absent to the level of the digits, Bilateral.     Neuro: Saph/sural/SP/DP/plantar sensation intact to light touch. Protective sensation is intact to 10/10 sites as tested with a 5.07g SWMF, Bilateral.     Musculoskeletal: EHL/FHL/GS/TA gross motor intact. Gross deformity is nonreducible hammertoe deformity digits 2 through 4 bilateral. Pain elicited to palpation of digits 1&2 right foot, 1 left foot. Dermatologic: No open lesions, Bilateral. Skin is atrophic, Bilateral. Interdigital maceration absent, Bilateral.  Nails 1-10 are thickened with subungual debris noted, in addition to being elongated and dystrophic. Focal hyperkeratotic tissue noted to the lateral plantar aspect of the distal hallux on the right. Focal hyperkeratotic tissue also present sub 3rd met head and sub 5th met base, right. Assessment   Kei Fontana is a 63 y.o. male with     Diagnosis Orders   1. Onychomycosis     2. Callus of foot     3. Toe pain, bilateral          Plan   Patient examined and evaluated. Diagnosis and treatment options discussed in detail. Discussed with patient that he is likely getting recurring calluses from uneven distribution of weight over plantar surface of feet when walking causing excess force and subsequent callus formation. Patient understands and agrees to trying OTC shoe inserts. Nails 1,2 on the right and 1 on the left debrided sharply with sterile nail nippers without incident. Patient reports 0/10 post-debridement pain. Calluses x3 to right foot debrided with sterile #15 blade without incident. Patient reports 0/10 post-debridement pain. Patient to RTC in 3 months. Please, call the office with any questions or concerns   Seen and discussed with Dr. Elvis Severs       No orders of the defined types were placed in this encounter. No orders of the defined types were placed in this encounter.       Mary Ann Dominguez DPM Podiatric Medicine & Surgery   2/21/2021 at 7:11 PM

## 2021-05-21 ENCOUNTER — OFFICE VISIT (OUTPATIENT)
Dept: PODIATRY | Age: 61
End: 2021-05-21
Payer: COMMERCIAL

## 2021-05-21 VITALS
SYSTOLIC BLOOD PRESSURE: 156 MMHG | WEIGHT: 144 LBS | DIASTOLIC BLOOD PRESSURE: 66 MMHG | HEART RATE: 68 BPM | BODY MASS INDEX: 22.6 KG/M2 | HEIGHT: 67 IN

## 2021-05-21 DIAGNOSIS — I73.9 PERIPHERAL VASCULAR DISORDER (HCC): ICD-10-CM

## 2021-05-21 DIAGNOSIS — M79.675 TOE PAIN, BILATERAL: ICD-10-CM

## 2021-05-21 DIAGNOSIS — B35.1 DERMATOPHYTOSIS OF NAIL: ICD-10-CM

## 2021-05-21 DIAGNOSIS — M79.671 PAIN IN BOTH FEET: ICD-10-CM

## 2021-05-21 DIAGNOSIS — L84 CALLUS OF FOOT: Primary | ICD-10-CM

## 2021-05-21 DIAGNOSIS — M79.674 TOE PAIN, BILATERAL: ICD-10-CM

## 2021-05-21 DIAGNOSIS — M79.672 PAIN IN BOTH FEET: ICD-10-CM

## 2021-05-21 PROCEDURE — G8420 CALC BMI NORM PARAMETERS: HCPCS | Performed by: STUDENT IN AN ORGANIZED HEALTH CARE EDUCATION/TRAINING PROGRAM

## 2021-05-21 PROCEDURE — 11721 DEBRIDE NAIL 6 OR MORE: CPT | Performed by: STUDENT IN AN ORGANIZED HEALTH CARE EDUCATION/TRAINING PROGRAM

## 2021-05-21 PROCEDURE — 11056 PARNG/CUTG B9 HYPRKR LES 2-4: CPT | Performed by: STUDENT IN AN ORGANIZED HEALTH CARE EDUCATION/TRAINING PROGRAM

## 2021-05-21 PROCEDURE — G8427 DOCREV CUR MEDS BY ELIG CLIN: HCPCS | Performed by: STUDENT IN AN ORGANIZED HEALTH CARE EDUCATION/TRAINING PROGRAM

## 2021-05-21 PROCEDURE — 1036F TOBACCO NON-USER: CPT | Performed by: STUDENT IN AN ORGANIZED HEALTH CARE EDUCATION/TRAINING PROGRAM

## 2021-05-21 PROCEDURE — 99213 OFFICE O/P EST LOW 20 MIN: CPT | Performed by: STUDENT IN AN ORGANIZED HEALTH CARE EDUCATION/TRAINING PROGRAM

## 2021-05-21 PROCEDURE — 3017F COLORECTAL CA SCREEN DOC REV: CPT | Performed by: STUDENT IN AN ORGANIZED HEALTH CARE EDUCATION/TRAINING PROGRAM

## 2021-05-21 PROCEDURE — 99212 OFFICE O/P EST SF 10 MIN: CPT | Performed by: STUDENT IN AN ORGANIZED HEALTH CARE EDUCATION/TRAINING PROGRAM

## 2021-05-21 NOTE — PATIENT INSTRUCTIONS
Please obtain sorbothane inserts from [employer]  Please use silicone toe sleeve caps for 1st and 2nd toe to prevent rubbing

## 2021-05-21 NOTE — PROGRESS NOTES
Patient instructed to remove shoes and socks and instructed to sit in exam chair. Current PCP is [facility] [place], [place] and date of last visit was 12-10-20. Do you have a follow up visit scheduled?   No

## 2021-05-23 NOTE — PROGRESS NOTES
[address], [place]  Tel: [phone]   Fax: [phone]    Subjective     CC: Elongated toenails and calluses     HPI:  Aki Ramos is a 63y.o. year old male who presents to clinic today complaining of painful, elongated toenails and calluses. The patient states their digits are painful when the toenails are elongated, causing rubbing in shoe gear. The patient denies tingling and numbness in the lower extremities. Of note the patient has history of PVD status post revascularization bilaterally, by his vascular surgeon in the [facility] system. The patient denies any history of acute trauma. The patient denies any other pedal complaints. Was instructed to obtain OTC inserts however has not had time to obtain them. Notes previous crest pad did not improve callus reduction. Primary care physician is [employer] [place], [place].    ROS:    Constitutional: Denies nausea, vomiting, fever, chills. Neurologic: Denies numbness, tingling, and burning in the feet. Vascular: Denies symptoms of lower extremity claudication. Skin: Denies open wounds. Otherwise negative except as noted in the HPI. PMH:  No past medical history on file. Surgical History:   No past surgical history on file. Social History:  Social History     Tobacco Use    Smoking status: Former Smoker     Packs/day: 0.25     Years: 20.00     Pack years: 5.00     Quit date: 2020     Years since quittin.3    Smokeless tobacco: Never Used    Tobacco comment: quit 1 year ago   Substance Use Topics    Alcohol use: Not on file    Drug use: Never       Medications:  Prior to Admission medications    Medication Sig Start Date End Date Taking?  Authorizing Provider   aspirin (DEIRDRE ASPIRIN) 325 MG tablet Deirdre Aspirin   81 mg once daily    Historical Provider, MD   Emollient (CERAVE) CREA cerave       cre    Historical Provider, MD   EQ STOMACH RELIEF 262 MG/15ML suspension 4/12/19   Historical Provider, MD   clopidogrel (PLAVIX) 75 MG tablet clopidogrel 75 mg tablet 7/5/17   Historical Provider, MD   hydrOXYzine (ATARAX) 25 MG tablet TAKE 1 TABLET BY MOUTH TWICE DAILY AS NEEDED FOR ITCHING 7/9/19   Historical Provider, MD   ipratropium (ATROVENT HFA) 17 MCG/ACT inhaler  8/25/17   Historical Provider, MD   levofloxacin (LEVAQUIN) 500 MG tablet levofloxacin 500 mg tablet    Historical Provider, MD   metroNIDAZOLE (FLAGYL) 500 MG tablet  4/12/19   Historical Provider, MD   naproxen (NAPROSYN) 500 MG tablet  7/8/17   Historical Provider, MD   pantoprazole (PROTONIX) 40 MG tablet  4/23/19   Historical Provider, MD   sulfamethoxazole-trimethoprim (BACTRIM DS;SEPTRA DS) 800-160 MG per tablet sulfamethoxazole 800 mg-trimethoprim 160 mg tablet    Historical Provider, MD   tetracycline (ACHROMYCIN;SUMYCIN) 500 MG capsule  4/12/19   Historical Provider, MD   albuterol sulfate HFA (PROAIR HFA) 108 (90 Base) MCG/ACT inhaler ProAir HFA 90 mcg/actuation aerosol inhaler   INHALE 2 PUFFS BY MOUTH EVERY 4 HOURS AS NEEDED    Historical Provider, MD   Ferrous Sulfate (IRON) 325 (65 Fe) MG TABS TAKE 1 TABLET BY MOUTH ONCE DAILY WITH MEALS FOR 30 DAYS 5/30/19   Historical Provider, MD   urea (CARMOL) 20 % cream Apply topically as needed. 7/12/19   Yana Mancuso, DPM   ELIQUIS 5 MG TABS tablet  5/1/19   Historical Provider, MD   potassium gluconate 550 (90 K) MG TABS tablet potassium gluconate 550 mg (90 mg) tablet   Take 1 tablet every day by oral route. Historical Provider, MD   atorvastatin (LIPITOR) 80 MG tablet 1 tablet 8/28/17   Historical Provider, MD   metoprolol tartrate (LOPRESSOR) 25 MG tablet Take by mouth    Historical Provider, MD       Objective     Vitals:    05/21/21 1249   BP: (!) 156/66   Pulse: 68       No results found for: LABA1C    Physical Exam:  General:  Alert and oriented x3. In no acute distress.      Lower Extremity Physical Exam:    Vascular: DP and PT pulses are nonpalpable, Bilateral. CFT <3 seconds to all digits, Bilateral.  No edema, Bilateral.  Hair growth is absent to the level of the digits, Bilateral.     Neuro: Saph/sural/SP/DP/plantar sensation intact to light touch. Protective sensation is intact to 10/10 sites as tested with a 5.07g SWMF, Bilateral.     Musculoskeletal: EHL/FHL/GS/TA gross motor intact. Gross deformity is nonreducible hammertoe deformity digits 2 through 4 bilateral. Pain elicited to palpation of digits 1&2 right foot distally at [facility]    Dermatologic: No open lesions, Bilateral. Skin is atrophic, Bilateral. Interdigital maceration absent, Bilateral.  Nails 1-10 are thickened with subungual debris noted, in addition to being elongated and dystrophic. Focal hyperkeratotic tissue noted to the lateral plantar aspect of the distal hallux on the right. Focal hyperkeratotic tissue also present distal 2nd right toe and sub 5th met head, right. No erythema, fluctuance, drainage, crepitus. Assessment   Juwan Flores is a 63 y.o. male with     Diagnosis Orders   1. Callus of foot  TRIM BENIGN HYPERKERATOTIC SKIN LESION,2-4    65091 - HI DEBRIDEMENT OF NAILS, 6 OR MORE   2. Toe pain, bilateral  TRIM BENIGN HYPERKERATOTIC SKIN LESION,2-4    22430 - HI DEBRIDEMENT OF NAILS, 6 OR MORE   3. Peripheral vascular disorder (HCC)  TRIM BENIGN HYPERKERATOTIC SKIN LESION,2-4    14036 - HI DEBRIDEMENT OF NAILS, 6 OR MORE   4. Pain in both feet  TRIM BENIGN HYPERKERATOTIC SKIN LESION,2-4    84781 - HI DEBRIDEMENT OF NAILS, 6 OR MORE        Plan   Patient examined and evaluated. Diagnosis and treatment options discussed in detail. Discussed with patient that he is likely getting recurring calluses from uneven distribution of weight over plantar surface of feet when walking causing excess force and subsequent callus formation. Patient understands and agrees to trying OTC shoe inserts.   Rec sorbothane inserts, obtain and bring to next appt  Dispensed silicone toe sleeves  Nails x10 debrided woi using sterile nail nippers  Calluses x3 to right foot trimmed woi. No underlying wounds noted  Patient to RTC in 1 months, poss CMO. Please, call the office with any questions or concerns   Seen and discussed with Dr. Irineo Figueroa       No orders of the defined types were placed in this encounter.     Orders Placed This Encounter   Procedures    TRIM BENIGN HYPERKERATOTIC SKIN LESION,2-4    [address] [facility],[address], [address]   Podiatric Medicine & Surgery   5/23/2021 at 11:41 AM

## 2021-06-25 ENCOUNTER — OFFICE VISIT (OUTPATIENT)
Dept: PODIATRY | Age: 61
End: 2021-06-25
Payer: COMMERCIAL

## 2021-06-25 VITALS
BODY MASS INDEX: 22.44 KG/M2 | WEIGHT: 143 LBS | DIASTOLIC BLOOD PRESSURE: 60 MMHG | HEART RATE: 105 BPM | SYSTOLIC BLOOD PRESSURE: 111 MMHG | HEIGHT: 67 IN

## 2021-06-25 DIAGNOSIS — M79.675 TOE PAIN, BILATERAL: ICD-10-CM

## 2021-06-25 DIAGNOSIS — M79.674 TOE PAIN, BILATERAL: ICD-10-CM

## 2021-06-25 DIAGNOSIS — L84 CALLUS OF FOOT: Primary | ICD-10-CM

## 2021-06-25 DIAGNOSIS — I73.9 PERIPHERAL VASCULAR DISORDER (HCC): ICD-10-CM

## 2021-06-25 DIAGNOSIS — B35.1 DERMATOPHYTOSIS OF NAIL: ICD-10-CM

## 2021-06-25 PROCEDURE — 99213 OFFICE O/P EST LOW 20 MIN: CPT | Performed by: PODIATRIST

## 2021-06-25 PROCEDURE — G8420 CALC BMI NORM PARAMETERS: HCPCS | Performed by: PODIATRIST

## 2021-06-25 PROCEDURE — 3017F COLORECTAL CA SCREEN DOC REV: CPT | Performed by: PODIATRIST

## 2021-06-25 PROCEDURE — G8427 DOCREV CUR MEDS BY ELIG CLIN: HCPCS | Performed by: PODIATRIST

## 2021-06-25 PROCEDURE — 1036F TOBACCO NON-USER: CPT | Performed by: PODIATRIST

## 2021-06-25 PROCEDURE — 99212 OFFICE O/P EST SF 10 MIN: CPT

## 2021-06-25 NOTE — PROGRESS NOTES
Patient instructed to remove shoes and socks and instructed to sit in exam chair. Current PCP is [facility] [place], [place] and date of last visit was 6-24-21. Do you have a follow up visit scheduled?   No

## 2021-06-25 NOTE — PROGRESS NOTES
[employer] [facility]  [address] [place] [address] [address] [facility], [address] [place]  Tel: [phone]   Fax: [phone]    Subjective     CC: Elongated toenails and calluses     HPI:  Rosamaria Cosby is a 63y.o. year old male who presents to clinic today for follow-up of painful calluses. He did obtain the gel inserts recommended and notes significant relief of pain. Of note the patient has history of PVD status post revascularization bilaterally, by his vascular surgeon in the [address] system. Primary care physician is [facility], [facility] [place].    ROS:    Constitutional: Denies nausea, vomiting, fever, chills. Neurologic: Denies numbness, tingling, and burning in the feet. Vascular: Denies symptoms of lower extremity claudication. Skin: Denies open wounds. Otherwise negative except as noted in the HPI. PMH:  No past medical history on file. Surgical History:   No past surgical history on file. Social History:  Social History     Tobacco Use    Smoking status: Former Smoker     Packs/day: 0.25     Years: 20.00     Pack years: 5.00     Quit date: 2020     Years since quittin.4    Smokeless tobacco: Never Used    Tobacco comment: quit 1 year ago   Substance Use Topics    Alcohol use: Not on file    Drug use: Never       Medications:  Prior to Admission medications    Medication Sig Start Date End Date Taking?  Authorizing Provider   aspirin (PRAVIN ASPIRIN) 325 MG tablet Pravin Aspirin   81 mg once daily   Yes Historical Provider, MD Swanson (Florian Jeans) CREA cerave       cre   Yes Historical Provider, MD   EQ STOMACH RELIEF 262 MG/15ML suspension  19  Yes Historical Provider, MD   clopidogrel (PLAVIX) 75 MG tablet clopidogrel 75 mg tablet 17  Yes Historical Provider, MD   hydrOXYzine (ATARAX) 25 MG tablet TAKE 1 TABLET BY MOUTH TWICE DAILY AS NEEDED FOR ITCHING 19  Yes Historical Provider, MD   ipratropium (ATROVENT HFA) 17 MCG/ACT inhaler  17  Yes Historical Provider, MD levofloxacin (LEVAQUIN) 500 MG tablet levofloxacin 500 mg tablet   Yes Historical Provider, MD   metroNIDAZOLE (FLAGYL) 500 MG tablet  4/12/19  Yes Historical Provider, MD   naproxen (NAPROSYN) 500 MG tablet  7/8/17  Yes Historical Provider, MD   pantoprazole (PROTONIX) 40 MG tablet  4/23/19  Yes Historical Provider, MD   sulfamethoxazole-trimethoprim (BACTRIM DS;SEPTRA DS) 800-160 MG per tablet sulfamethoxazole 800 mg-trimethoprim 160 mg tablet   Yes Historical Provider, MD   tetracycline (ACHROMYCIN;SUMYCIN) 500 MG capsule  4/12/19  Yes Historical Provider, MD   albuterol sulfate HFA (PROAIR HFA) 108 (90 Base) MCG/ACT inhaler ProAir HFA 90 mcg/actuation aerosol inhaler   INHALE 2 PUFFS BY MOUTH EVERY 4 HOURS AS NEEDED   Yes Historical Provider, MD   Ferrous Sulfate (IRON) 325 (65 Fe) MG TABS TAKE 1 TABLET BY MOUTH ONCE DAILY WITH MEALS FOR 30 DAYS 5/30/19  Yes Historical Provider, MD   urea (CARMOL) 20 % cream Apply topically as needed. 7/12/19  Yes Pilar Oppenheim, DPM   ELIQUIS 5 MG TABS tablet  5/1/19  Yes Historical Provider, MD   potassium gluconate 550 (90 K) MG TABS tablet potassium gluconate 550 mg (90 mg) tablet   Take 1 tablet every day by oral route. Yes Historical Provider, MD   atorvastatin (LIPITOR) 80 MG tablet 1 tablet 8/28/17  Yes Historical Provider, MD   metoprolol tartrate (LOPRESSOR) 25 MG tablet Take by mouth   Yes Historical Provider, MD       Objective     Vitals:    06/25/21 1242   BP: 111/60   Pulse: 105       No results found for: LABA1C    Physical Exam:  General:  Alert and oriented x3. In no acute distress. Lower Extremity Physical Exam:    Vascular: DP and PT pulses are not palpable, Bilateral. CFT <3 seconds to all digits, Bilateral.  No edema, Bilateral.  Hair growth is absent to the level of the digits, Bilateral.     Neuro: Saph/sural/SP/DP/plantar sensation intact to light touch.  Protective sensation is intact to 10/10 sites as tested with a 5.07g SWMF, Bilateral. Musculoskeletal: EHL/FHL/GS/TA gross motor intact. Gross deformity is present, non-reducible hammertoe deformity digits 2 through 4 bilateral.     Dermatologic: No open lesion, Bilateral. Skin is atrophic, Bilateral. Interdigital maceration absent, Bilateral.  Nails 1-10 are thickened but trim with subungual debris noted. Focal hyperkeratotic tissue noted to the lateral plantar aspect of the distal hallux on the right. Focal hyperkeratotic tissue also present distal 2nd right toe and sub 5th met head, right. No erythema, fluctuance, drainage, crepitus. No increased calor. Assessment   Wendi Lanes is a 63 y.o. male with     Diagnosis Orders   1. Callus of foot     2. Toe pain, bilateral     3. Peripheral vascular disorder ([address]     4. Dermatophytosis of nail          Plan   Patient examined and evaluated. Diagnosis and treatment options discussed in detail. Discussed again with Avelina Olvera that he is developing calluses from hammertoes and uneven distribution of weight over plantar surface of feet when walking causing excess force and subsequent callus formation. Patient understands and agrees to trying OTC shoe inserts. Continue gel inserts  Consider custom orthotic with offloading of the pressure areas in the future if gel inserts do not sufficiently relieve symptoms   Patient to RTC as needed      No orders of the defined types were placed in this encounter. No orders of the defined types were placed in this encounter.       Mariann Dillon DPM   Podiatric Medicine & Surgery   6/25/2021 at 1:52 PM

## 2021-08-05 ENCOUNTER — TELEPHONE (OUTPATIENT)
Dept: PODIATRY | Age: 61
End: 2021-08-05

## 2021-08-05 NOTE — TELEPHONE ENCOUNTER
CALLED INSURANCE FOR PRIOR AUTH FOR ORTHOTICS SPOKE TO KIRILL, PT IS COVERED FOR UP TO A MAX AMOUNT OF 12 PAIRS OF ORTHOTICS.     REF# [id number]

## 2021-08-20 ENCOUNTER — OFFICE VISIT (OUTPATIENT)
Dept: PODIATRY | Age: 61
End: 2021-08-20
Payer: COMMERCIAL

## 2021-08-20 VITALS
WEIGHT: 136 LBS | BODY MASS INDEX: 21.62 KG/M2 | DIASTOLIC BLOOD PRESSURE: 58 MMHG | HEART RATE: 80 BPM | SYSTOLIC BLOOD PRESSURE: 150 MMHG

## 2021-08-20 DIAGNOSIS — B35.1 DERMATOPHYTOSIS OF NAIL: ICD-10-CM

## 2021-08-20 DIAGNOSIS — M79.675 TOE PAIN, BILATERAL: ICD-10-CM

## 2021-08-20 DIAGNOSIS — L84 CALLUS OF FOOT: Primary | ICD-10-CM

## 2021-08-20 DIAGNOSIS — M79.674 TOE PAIN, BILATERAL: ICD-10-CM

## 2021-08-20 DIAGNOSIS — I73.9 PERIPHERAL VASCULAR DISORDER (HCC): ICD-10-CM

## 2021-08-20 DIAGNOSIS — M79.672 PAIN IN BOTH FEET: ICD-10-CM

## 2021-08-20 DIAGNOSIS — M79.671 PAIN IN BOTH FEET: ICD-10-CM

## 2021-08-20 PROCEDURE — 1036F TOBACCO NON-USER: CPT

## 2021-08-20 PROCEDURE — 99212 OFFICE O/P EST SF 10 MIN: CPT

## 2021-08-20 PROCEDURE — 3017F COLORECTAL CA SCREEN DOC REV: CPT

## 2021-08-20 PROCEDURE — G8420 CALC BMI NORM PARAMETERS: HCPCS

## 2021-08-20 PROCEDURE — G8427 DOCREV CUR MEDS BY ELIG CLIN: HCPCS

## 2021-08-20 NOTE — PROGRESS NOTES
[address]  [address] [place] [address],  S Yobani Gutiérrez  Tel: [phone]   Fax: [phone]    Subjective     CC: Elongated toenails and calluses     HPI:  Steffen Galeas is a 63y.o. year old male who presents to clinic today for follow-up of painful calluses and painful, elongated toenails bilaterally. He states he is unable to trim toenails on his own secondary to history of PVD status post revascularization bilaterally, by his vascular surgeon in the [facility] system. He did obtain the gel inserts recommended and notes some relief of pain, but is interested in custom orthotics today. Primary care physician is [facility], [place].    ROS:    Constitutional: Denies nausea, vomiting, fever, chills. Neurologic: Denies numbness, tingling, and burning in the feet. Vascular: Denies symptoms of lower extremity claudication. Skin: Denies open wounds. Otherwise negative except as noted in the HPI. PMH:  No past medical history on file. Surgical History:   No past surgical history on file. Social History:  Social History     Tobacco Use    Smoking status: Former Smoker     Packs/day: 0.25     Years: 20.00     Pack years: 5.00     Quit date: 2020     Years since quittin.6    Smokeless tobacco: Never Used    Tobacco comment: quit 1 year ago   Substance Use Topics    Alcohol use: Not on file    Drug use: Never       Medications:  Prior to Admission medications    Medication Sig Start Date End Date Taking?  Authorizing Provider   aspirin (DEIRDRE ASPIRIN) 325 MG tablet Deirdre Aspirin   81 mg once daily   Yes Historical Provider, MD Swanson (Camilla Church) CREMANA cerave       cre   Yes Historical Provider, MD ROPER STOMACH RELIEF 262 MG/15ML suspension  19  Yes Historical Provider, MD   clopidogrel (PLAVIX) 75 MG tablet clopidogrel 75 mg tablet 17  Yes Historical Provider, MD   hydrOXYzine (ATARAX) 25 MG tablet TAKE 1 TABLET BY MOUTH TWICE DAILY AS NEEDED FOR ITCHING 7/9/19  Yes Historical Provider, MD   ipratropium (ATROVENT HFA) 17 MCG/ACT inhaler  8/25/17  Yes Historical Provider, MD   levofloxacin (LEVAQUIN) 500 MG tablet levofloxacin 500 mg tablet   Yes Historical Provider, MD   metroNIDAZOLE (FLAGYL) 500 MG tablet  4/12/19  Yes Historical Provider, MD   naproxen (NAPROSYN) 500 MG tablet  7/8/17  Yes Historical Provider, MD   pantoprazole (PROTONIX) 40 MG tablet  4/23/19  Yes Historical Provider, MD   sulfamethoxazole-trimethoprim (BACTRIM DS;SEPTRA DS) 800-160 MG per tablet sulfamethoxazole 800 mg-trimethoprim 160 mg tablet   Yes Historical Provider, MD   tetracycline (ACHROMYCIN;SUMYCIN) 500 MG capsule  4/12/19  Yes Historical Provider, MD   albuterol sulfate HFA (PROAIR HFA) 108 (90 Base) MCG/ACT inhaler ProAir HFA 90 mcg/actuation aerosol inhaler   INHALE 2 PUFFS BY MOUTH EVERY 4 HOURS AS NEEDED   Yes Historical Provider, MD   Ferrous Sulfate (IRON) 325 (65 Fe) MG TABS TAKE 1 TABLET BY MOUTH ONCE DAILY WITH MEALS FOR 30 DAYS 5/30/19  Yes Historical Provider, MD   urea (CARMOL) 20 % cream Apply topically as needed. 7/12/19  Yes Lisset Bowling, DPM   ELIQUIS 5 MG TABS tablet  5/1/19  Yes Historical Provider, MD   potassium gluconate 550 (90 K) MG TABS tablet potassium gluconate 550 mg (90 mg) tablet   Take 1 tablet every day by oral route. Yes Historical Provider, MD   atorvastatin (LIPITOR) 80 MG tablet 1 tablet 8/28/17  Yes Historical Provider, MD   metoprolol tartrate (LOPRESSOR) 25 MG tablet Take by mouth   Yes Historical Provider, MD       Objective     Vitals:    08/20/21 1305   BP: (!) 150/58   Pulse:        No results found for: LABA1C    Physical Exam:  General:  Alert and oriented x3. In no acute distress.      Lower Extremity Physical Exam:    Vascular: DP and PT pulses are not palpable, Bilateral. CFT <3 seconds to all digits, Bilateral.  No edema, Bilateral.  Hair growth is absent to the level of the digits, Bilateral.     Neuro: Saph/sural/SP/DP/plantar sensation intact to light touch. Protective sensation is intact to 10/10 sites as tested with a 5.07g SWMF, Bilateral.     Musculoskeletal: EHL/FHL/GS/TA gross motor intact. Gross deformity is present, non-reducible hammertoe deformity digits 2 through 4 bilateral.     Dermatologic: No open lesion, Bilateral. Skin is atrophic, Bilateral. Interdigital maceration absent, Bilateral.  Nails 1-10 are thickened but trim with subungual debris noted. Focal hyperkeratotic tissue noted to the lateral plantar aspect of the distal hallux on the right. Focal hyperkeratotic tissue also present distal 2nd right toe and sub 5th met head, right. No erythema, fluctuance, drainage, crepitus. No increased calor. Assessment   Michael Cassidy is a 63 y.o. male with     Diagnosis Orders   1. Callus of foot     2. Toe pain, bilateral     3. Peripheral vascular disorder ([address]     4. Dermatophytosis of nail     5. Pain in both feet          Plan   Patient examined and evaluated. Diagnosis and treatment options discussed in detail. Discussed again with Frank Gabrielkatiana that he is developing calluses from hammertoes and uneven distribution of weight over plantar surface of feet when walking causing excess force and subsequent callus formation. Patient understands and agreed to trying custom orthotics. Hyperkeratotic lesion of distal second right toe and submet 5 sharply debrided with #15 blade, without any incident. Patient was fitted for Stride Lite orthotics. Patient to RTC as needed       No orders of the defined types were placed in this encounter. No orders of the defined types were placed in this encounter. Luca Lee DPM   Podiatric Medicine & Surgery   8/20/2021 at 2:42 PM     I performed a history and physical examination of the patient and discussed management with the resident. I reviewed the residents note and agree with the documented findings and plan of care. Any areas of disagreement are noted on the chart.  I

## 2021-10-01 ENCOUNTER — OFFICE VISIT (OUTPATIENT)
Dept: PODIATRY | Age: 61
End: 2021-10-01
Payer: COMMERCIAL

## 2021-10-01 VITALS
WEIGHT: 138 LBS | BODY MASS INDEX: 21.66 KG/M2 | HEIGHT: 67 IN | DIASTOLIC BLOOD PRESSURE: 78 MMHG | HEART RATE: 87 BPM | SYSTOLIC BLOOD PRESSURE: 140 MMHG

## 2021-10-01 DIAGNOSIS — M79.671 BILATERAL FOOT PAIN: ICD-10-CM

## 2021-10-01 DIAGNOSIS — M79.672 BILATERAL FOOT PAIN: ICD-10-CM

## 2021-10-01 PROCEDURE — 99999 PR OFFICE/OUTPT VISIT,PROCEDURE ONLY: CPT | Performed by: PODIATRIST

## 2021-10-01 PROCEDURE — 99211 OFF/OP EST MAY X REQ PHY/QHP: CPT | Performed by: PODIATRIST

## 2025-02-21 PROBLEM — J38.7 LARYNGEAL MASS: Status: ACTIVE | Noted: 2025-02-21

## 2025-02-24 ENCOUNTER — SOCIAL WORK (OUTPATIENT)
Dept: ONCOLOGY | Age: 65
End: 2025-02-24

## 2025-02-24 ENCOUNTER — TELEPHONE (OUTPATIENT)
Dept: ONCOLOGY | Age: 65
End: 2025-02-24

## 2025-02-24 ENCOUNTER — TELEPHONE (OUTPATIENT)
Dept: RADIATION ONCOLOGY | Age: 65
End: 2025-02-24

## 2025-02-24 NOTE — TELEPHONE ENCOUNTER
New referral in que for patient to see rad onc & med onc.  Spoke with Neisha in med onc office appt obtained for 3/7/25 @9am with Dr. Mota.  Rad Onc Dr. Finnegan appt scheduled 3/7/25 @10am.  Writer scheduled pet/ct ordered by Dr. Larsen. PET/CT scheduled 2/28/25 arriving @2pm instructions for test attached.   Ember RUIZ RN navigator notified of new appts for patient.       [facility]  PREP:  * If the patient is from a nursing home they must be accompanied by a representative i.e. patient aide  * NOTHING TO EAT OR DRINK 6 HOURS PRIOR TO TEST EXCEPT FOR WATER  * DRINK 24 OZ OF WATER STARTING 2 HOURS PRIOR TO TEST  * NO VIOGROUS EXERCISE 24 HOURS PRIOR TO TEST  * NO DIABETIC MEDICATION THE MORNING OF EXAM (INCLUDING INSULIN)  * WEAR CLOTHING THAT DOES NOT HAVE ANY METAL SNAPS, ZIPPERS, ETC.  * PLEASE REMOVE ANY JEWELERY BEFORE ARRIVING FOR YOUR APPOINTMENT  * ARRIVE 30 MINUTES PRIOR TO YOUR EXAM TIME  * REPORT TO ENTRANCE A (EMERGENCY ENTANCE)  * PATIENTS SHOULD PLAN ON BEING AT THE HOSPITAL FOR 2.5 HOURS    Dept directions for [facility] N[facility]IC[facility]:  [address] [place] [address]  Patient to enter through the ER Entrance (building A) and report to .  Dept directions for [facility] CT:  [id number] [place] [address] [place] [address]  Patients should use the ER entrance which is located in building A.

## 2025-02-24 NOTE — TELEPHONE ENCOUNTER
Dr. Larsen placed referrals for oncology navigation & MO/RO.  Noted pt scheduled for DL w/bx 3/3 & PET/CT scan ordered.  Spoke w/Gillian, [facility] RO , updated on referrals & PET/CT scan order.  Gillian stated will coordinate PET/CT scan & dual MO/RO consults.      Name: Matt Mao  : 1960  MRN: [id number]    Oncology Navigation- Initial Note:    Intake-  Contact Type: Telephone    Continuum of Care: Diagnosis/Active Treatment    Smoking hx: Former, quit 2 weeks ago.  Smoking cessation assistance and resources provided.    Notes: Introductory phone call made to patient.  Instructed patient writer will be navigating oncology care & may call writer w/questions/concerns @ [phone] prn.  Discussed potential barriers to care, pt denied any.  Inquired on dental health.  Pt stated has full set of dentures & does not wear r/t ill fitting.  Inquired on alcohol/drug use, pt stated quit drinking beer 2 months ago & smokes marijuana 2-3 times per week.  Notified pt Gillian coordinating PET/CT scan & dual MO/RO appts.  Instructed pt will receive call w/appt details once scheduled.  Pt verbalized understanding & denied questions/concerns.  Encouraged to contact writer prn.  [facility] written materials along w/writer's business card mailed to patient.  Will continue to follow.     Electronically signed by Ember Dillon RN on 2025 at 11:30 AM

## 2025-02-24 NOTE — TELEPHONE ENCOUNTER
Name: Matt Mao  : 1960  MRN: [id number]    Oncology Navigation Follow-Up Note    Contact Type:  Telephone    Notes: Pt updated on  PET/CT appt details including pre imaging instructions & 3/7 dual MO/RO appt details.  Will continue to follow.      Electronically signed by Ember Dillon RN on 2025 at 1:23 PM

## 2025-02-24 NOTE — PROGRESS NOTES
set up ride for 2/28 and 3/7 through insurance provider. Patient updated.     Transportation details:  [facility]     ~ 1:15 pm on 2/28. 8:15 am on 3/7  Confirmation #[id number], #[id number]  Call for ride home: [phone]

## 2025-02-25 ENCOUNTER — TELEPHONE (OUTPATIENT)
Dept: ONCOLOGY | Age: 65
End: 2025-02-25

## 2025-02-25 NOTE — DISCHARGE INSTRUCTIONS
scheduled for follow up in ~1 week after your surgery to discuss the results     Useful Numbers:      ENT Nurse triage line     [phone]  (ENT-related questions or concerns, 8am-4pm, Monday through Friday)   Main Office         [phone]  (to schedule routine appointments)    After hours contact number [phone]  (After 4pm Monday through Friday and weekends; ask to speak with ENT physician on call)      No alcoholic beverages, no driving or operating machinery, no making important decisions for 24 hours.   You may have a normal diet but should eat lightly day of surgery.  Drink plenty of fluids.  Urinate within 8 hours after surgery, if unable to urinate call your doctor

## 2025-02-25 NOTE — TELEPHONE ENCOUNTER
Patient called to confirm transportation details and contact number for return ride. Information provided. Patient encouraged to reach out with any issues.

## 2025-02-28 ENCOUNTER — TELEPHONE (OUTPATIENT)
Dept: OTOLARYNGOLOGY | Age: 65
End: 2025-02-28

## 2025-02-28 ENCOUNTER — HOSPITAL ENCOUNTER (OUTPATIENT)
Dept: NUCLEAR MEDICINE | Age: 65
End: 2025-02-28
Attending: STUDENT IN AN ORGANIZED HEALTH CARE EDUCATION/TRAINING PROGRAM
Payer: MEDICARE

## 2025-02-28 DIAGNOSIS — G89.18 ACUTE POST-OPERATIVE PAIN: Primary | ICD-10-CM

## 2025-02-28 DIAGNOSIS — J38.7 LARYNGEAL MASS: ICD-10-CM

## 2025-02-28 DIAGNOSIS — R91.8 LUNG NODULES: ICD-10-CM

## 2025-02-28 DIAGNOSIS — C32.0 MALIGNANT NEOPLASM OF GLOTTIS (HCC): ICD-10-CM

## 2025-02-28 DIAGNOSIS — C32.0 MALIGNANT NEOPLASM OF GLOTTIS (HCC): Primary | ICD-10-CM

## 2025-02-28 LAB — GLUCOSE BLD-MCNC: 92 MG/DL (ref 75–110)

## 2025-02-28 PROCEDURE — A9609 HC RX DIAGNOSTIC RADIOPHARMACEUTICAL: HCPCS | Performed by: STUDENT IN AN ORGANIZED HEALTH CARE EDUCATION/TRAINING PROGRAM

## 2025-02-28 PROCEDURE — 3430000000 HC RX DIAGNOSTIC RADIOPHARMACEUTICAL: Performed by: STUDENT IN AN ORGANIZED HEALTH CARE EDUCATION/TRAINING PROGRAM

## 2025-02-28 PROCEDURE — 78815 PET IMAGE W/CT SKULL-THIGH: CPT

## 2025-02-28 PROCEDURE — 2500000003 HC RX 250 WO HCPCS: Performed by: STUDENT IN AN ORGANIZED HEALTH CARE EDUCATION/TRAINING PROGRAM

## 2025-02-28 PROCEDURE — 82947 ASSAY GLUCOSE BLOOD QUANT: CPT

## 2025-02-28 RX ORDER — IBUPROFEN 400 MG/1
400 TABLET, FILM COATED ORAL EVERY 6 HOURS PRN
Qty: 120 TABLET | Refills: 1 | Status: SHIPPED | OUTPATIENT
Start: 2025-02-28

## 2025-02-28 RX ORDER — FLUDEOXYGLUCOSE F 18 200 MCI/ML
10 INJECTION, SOLUTION INTRAVENOUS
Status: COMPLETED | OUTPATIENT
Start: 2025-02-28 | End: 2025-02-28

## 2025-02-28 RX ORDER — FAMOTIDINE 40 MG/1
40 TABLET, FILM COATED ORAL DAILY
COMMUNITY

## 2025-02-28 RX ORDER — ACETAMINOPHEN 325 MG/1
650 TABLET ORAL EVERY 6 HOURS PRN
Qty: 120 TABLET | Refills: 1 | Status: SHIPPED | OUTPATIENT
Start: 2025-02-28

## 2025-02-28 RX ORDER — SODIUM CHLORIDE 0.9 % (FLUSH) 0.9 %
10 SYRINGE (ML) INJECTION ONCE
Status: COMPLETED | OUTPATIENT
Start: 2025-02-28 | End: 2025-02-28

## 2025-02-28 RX ADMIN — SODIUM CHLORIDE, PRESERVATIVE FREE 10 ML: 5 INJECTION INTRAVENOUS at 14:05

## 2025-02-28 RX ADMIN — FLUDEOXYGLUCOSE F 18 11.7 MILLICURIE: 200 INJECTION, SOLUTION INTRAVENOUS at 14:06

## 2025-02-28 NOTE — PROGRESS NOTES
Patient states has not taken home meds since February 24,2024, patient states does not need them right now.

## 2025-03-02 DIAGNOSIS — K22.9 ESOPHAGEAL LESION: Primary | ICD-10-CM

## 2025-03-03 ENCOUNTER — ANESTHESIA EVENT (OUTPATIENT)
Dept: OPERATING ROOM | Age: 65
End: 2025-03-03
Payer: MEDICARE

## 2025-03-03 ENCOUNTER — ANESTHESIA (OUTPATIENT)
Dept: OPERATING ROOM | Age: 65
End: 2025-03-03
Payer: MEDICARE

## 2025-03-03 ENCOUNTER — HOSPITAL ENCOUNTER (OUTPATIENT)
Age: 65
Setting detail: OUTPATIENT SURGERY
Discharge: HOME | End: 2025-03-03
Attending: STUDENT IN AN ORGANIZED HEALTH CARE EDUCATION/TRAINING PROGRAM | Admitting: STUDENT IN AN ORGANIZED HEALTH CARE EDUCATION/TRAINING PROGRAM
Payer: MEDICARE

## 2025-03-03 VITALS
OXYGEN SATURATION: 97 % | BODY MASS INDEX: 21.86 KG/M2 | DIASTOLIC BLOOD PRESSURE: 59 MMHG | HEIGHT: 66 IN | TEMPERATURE: 97.7 F | SYSTOLIC BLOOD PRESSURE: 157 MMHG | HEART RATE: 56 BPM | RESPIRATION RATE: 12 BRPM | WEIGHT: 136 LBS

## 2025-03-03 DIAGNOSIS — J38.7 LARYNGEAL MASS: ICD-10-CM

## 2025-03-03 LAB
EKG ATRIAL RATE: 61 BPM
EKG P AXIS: 77 DEGREES
EKG P-R INTERVAL: 176 MS
EKG Q-T INTERVAL: 450 MS
EKG QRS DURATION: 82 MS
EKG QTC CALCULATION (BAZETT): 453 MS
EKG R AXIS: 71 DEGREES
EKG T AXIS: 66 DEGREES
EKG VENTRICULAR RATE: 61 BPM

## 2025-03-03 PROCEDURE — 6360000002 HC RX W HCPCS

## 2025-03-03 PROCEDURE — 6370000000 HC RX 637 (ALT 250 FOR IP): Performed by: STUDENT IN AN ORGANIZED HEALTH CARE EDUCATION/TRAINING PROGRAM

## 2025-03-03 PROCEDURE — 7100000000 HC PACU RECOVERY - FIRST 15 MIN: Performed by: STUDENT IN AN ORGANIZED HEALTH CARE EDUCATION/TRAINING PROGRAM

## 2025-03-03 PROCEDURE — 2500000003 HC RX 250 WO HCPCS: Performed by: SPECIALIST

## 2025-03-03 PROCEDURE — 6360000002 HC RX W HCPCS: Performed by: ANESTHESIOLOGY

## 2025-03-03 PROCEDURE — 31500 INSERT EMERGENCY AIRWAY: CPT | Performed by: STUDENT IN AN ORGANIZED HEALTH CARE EDUCATION/TRAINING PROGRAM

## 2025-03-03 PROCEDURE — 88331 PATH CONSLTJ SURG 1 BLK 1SPC: CPT

## 2025-03-03 PROCEDURE — 2709999900 HC NON-CHARGEABLE SUPPLY: Performed by: STUDENT IN AN ORGANIZED HEALTH CARE EDUCATION/TRAINING PROGRAM

## 2025-03-03 PROCEDURE — 6360000002 HC RX W HCPCS: Performed by: SPECIALIST

## 2025-03-03 PROCEDURE — 2500000003 HC RX 250 WO HCPCS: Performed by: STUDENT IN AN ORGANIZED HEALTH CARE EDUCATION/TRAINING PROGRAM

## 2025-03-03 PROCEDURE — 3600000014 HC SURGERY LEVEL 4 ADDTL 15MIN: Performed by: STUDENT IN AN ORGANIZED HEALTH CARE EDUCATION/TRAINING PROGRAM

## 2025-03-03 PROCEDURE — 3600000004 HC SURGERY LEVEL 4 BASE: Performed by: STUDENT IN AN ORGANIZED HEALTH CARE EDUCATION/TRAINING PROGRAM

## 2025-03-03 PROCEDURE — 88305 TISSUE EXAM BY PATHOLOGIST: CPT

## 2025-03-03 PROCEDURE — 7100000010 HC PHASE II RECOVERY - FIRST 15 MIN: Performed by: STUDENT IN AN ORGANIZED HEALTH CARE EDUCATION/TRAINING PROGRAM

## 2025-03-03 PROCEDURE — 7100000001 HC PACU RECOVERY - ADDTL 15 MIN: Performed by: STUDENT IN AN ORGANIZED HEALTH CARE EDUCATION/TRAINING PROGRAM

## 2025-03-03 PROCEDURE — 2580000003 HC RX 258: Performed by: SPECIALIST

## 2025-03-03 PROCEDURE — 31536 LARYNGOSCOPY W/BX & OP SCOPE: CPT | Performed by: STUDENT IN AN ORGANIZED HEALTH CARE EDUCATION/TRAINING PROGRAM

## 2025-03-03 PROCEDURE — 93005 ELECTROCARDIOGRAM TRACING: CPT | Performed by: ANESTHESIOLOGY

## 2025-03-03 PROCEDURE — 3700000000 HC ANESTHESIA ATTENDED CARE: Performed by: STUDENT IN AN ORGANIZED HEALTH CARE EDUCATION/TRAINING PROGRAM

## 2025-03-03 PROCEDURE — 7100000011 HC PHASE II RECOVERY - ADDTL 15 MIN: Performed by: STUDENT IN AN ORGANIZED HEALTH CARE EDUCATION/TRAINING PROGRAM

## 2025-03-03 PROCEDURE — 93010 ELECTROCARDIOGRAM REPORT: CPT | Performed by: INTERNAL MEDICINE

## 2025-03-03 PROCEDURE — 3700000001 HC ADD 15 MINUTES (ANESTHESIA): Performed by: STUDENT IN AN ORGANIZED HEALTH CARE EDUCATION/TRAINING PROGRAM

## 2025-03-03 RX ORDER — LIDOCAINE HYDROCHLORIDE 10 MG/ML
INJECTION, SOLUTION EPIDURAL; INFILTRATION; INTRACAUDAL; PERINEURAL
Status: DISCONTINUED | OUTPATIENT
Start: 2025-03-03 | End: 2025-03-03 | Stop reason: SDUPTHER

## 2025-03-03 RX ORDER — SODIUM CHLORIDE 0.9 % (FLUSH) 0.9 %
5-40 SYRINGE (ML) INJECTION EVERY 12 HOURS SCHEDULED
Status: DISCONTINUED | OUTPATIENT
Start: 2025-03-03 | End: 2025-03-03 | Stop reason: HOSPADM

## 2025-03-03 RX ORDER — SODIUM CHLORIDE 0.9 % (FLUSH) 0.9 %
5-40 SYRINGE (ML) INJECTION PRN
Status: DISCONTINUED | OUTPATIENT
Start: 2025-03-03 | End: 2025-03-03 | Stop reason: HOSPADM

## 2025-03-03 RX ORDER — FENTANYL CITRATE 50 UG/ML
INJECTION, SOLUTION INTRAMUSCULAR; INTRAVENOUS
Status: DISCONTINUED | OUTPATIENT
Start: 2025-03-03 | End: 2025-03-03 | Stop reason: SDUPTHER

## 2025-03-03 RX ORDER — ONDANSETRON 2 MG/ML
4 INJECTION INTRAMUSCULAR; INTRAVENOUS
Status: DISCONTINUED | OUTPATIENT
Start: 2025-03-03 | End: 2025-03-03 | Stop reason: HOSPADM

## 2025-03-03 RX ORDER — MAGNESIUM HYDROXIDE 1200 MG/15ML
LIQUID ORAL CONTINUOUS PRN
Status: DISCONTINUED | OUTPATIENT
Start: 2025-03-03 | End: 2025-03-03 | Stop reason: HOSPADM

## 2025-03-03 RX ORDER — PHENYLEPHRINE HCL IN 0.9% NACL 1 MG/10 ML
SYRINGE (ML) INTRAVENOUS
Status: DISCONTINUED | OUTPATIENT
Start: 2025-03-03 | End: 2025-03-03 | Stop reason: SDUPTHER

## 2025-03-03 RX ORDER — PROPOFOL 10 MG/ML
INJECTION, EMULSION INTRAVENOUS
Status: DISCONTINUED | OUTPATIENT
Start: 2025-03-03 | End: 2025-03-03 | Stop reason: SDUPTHER

## 2025-03-03 RX ORDER — DEXAMETHASONE SODIUM PHOSPHATE 10 MG/ML
INJECTION, SOLUTION INTRAMUSCULAR; INTRAVENOUS
Status: DISCONTINUED | OUTPATIENT
Start: 2025-03-03 | End: 2025-03-03 | Stop reason: SDUPTHER

## 2025-03-03 RX ORDER — OXYMETAZOLINE HYDROCHLORIDE 0.05 G/100ML
SPRAY NASAL PRN
Status: DISCONTINUED | OUTPATIENT
Start: 2025-03-03 | End: 2025-03-03 | Stop reason: HOSPADM

## 2025-03-03 RX ORDER — NALOXONE HYDROCHLORIDE 0.4 MG/ML
INJECTION, SOLUTION INTRAMUSCULAR; INTRAVENOUS; SUBCUTANEOUS PRN
Status: DISCONTINUED | OUTPATIENT
Start: 2025-03-03 | End: 2025-03-03 | Stop reason: HOSPADM

## 2025-03-03 RX ORDER — ONDANSETRON 2 MG/ML
INJECTION INTRAMUSCULAR; INTRAVENOUS
Status: DISCONTINUED | OUTPATIENT
Start: 2025-03-03 | End: 2025-03-03 | Stop reason: SDUPTHER

## 2025-03-03 RX ORDER — HYDRALAZINE HYDROCHLORIDE 20 MG/ML
10 INJECTION INTRAMUSCULAR; INTRAVENOUS
Status: DISCONTINUED | OUTPATIENT
Start: 2025-03-03 | End: 2025-03-03 | Stop reason: HOSPADM

## 2025-03-03 RX ORDER — LABETALOL HYDROCHLORIDE 5 MG/ML
10 INJECTION, SOLUTION INTRAVENOUS
Status: DISCONTINUED | OUTPATIENT
Start: 2025-03-03 | End: 2025-03-03 | Stop reason: HOSPADM

## 2025-03-03 RX ORDER — ROCURONIUM BROMIDE 10 MG/ML
INJECTION, SOLUTION INTRAVENOUS
Status: DISCONTINUED | OUTPATIENT
Start: 2025-03-03 | End: 2025-03-03 | Stop reason: SDUPTHER

## 2025-03-03 RX ORDER — SODIUM CHLORIDE 9 MG/ML
INJECTION, SOLUTION INTRAVENOUS PRN
Status: DISCONTINUED | OUTPATIENT
Start: 2025-03-03 | End: 2025-03-03 | Stop reason: HOSPADM

## 2025-03-03 RX ORDER — SODIUM CHLORIDE, SODIUM LACTATE, POTASSIUM CHLORIDE, CALCIUM CHLORIDE 600; 310; 30; 20 MG/100ML; MG/100ML; MG/100ML; MG/100ML
INJECTION, SOLUTION INTRAVENOUS
Status: DISCONTINUED | OUTPATIENT
Start: 2025-03-03 | End: 2025-03-03 | Stop reason: SDUPTHER

## 2025-03-03 RX ADMIN — PROPOFOL 50 MG: 10 INJECTION, EMULSION INTRAVENOUS at 10:11

## 2025-03-03 RX ADMIN — Medication 100 MCG: at 10:36

## 2025-03-03 RX ADMIN — PROPOFOL 150 MG: 10 INJECTION, EMULSION INTRAVENOUS at 10:07

## 2025-03-03 RX ADMIN — LIDOCAINE HYDROCHLORIDE 50 MG: 10 INJECTION, SOLUTION EPIDURAL; INFILTRATION; INTRACAUDAL; PERINEURAL at 10:07

## 2025-03-03 RX ADMIN — SUGAMMADEX 200 MG: 100 INJECTION, SOLUTION INTRAVENOUS at 10:44

## 2025-03-03 RX ADMIN — PROPOFOL 50 MG: 10 INJECTION, EMULSION INTRAVENOUS at 10:19

## 2025-03-03 RX ADMIN — SODIUM CHLORIDE, POTASSIUM CHLORIDE, SODIUM LACTATE AND CALCIUM CHLORIDE: 600; 310; 30; 20 INJECTION, SOLUTION INTRAVENOUS at 08:44

## 2025-03-03 RX ADMIN — DEXAMETHASONE SODIUM PHOSPHATE 10 MG: 10 INJECTION, SOLUTION INTRAMUSCULAR; INTRAVENOUS at 10:19

## 2025-03-03 RX ADMIN — ROCURONIUM BROMIDE 50 MG: 50 INJECTION INTRAVENOUS at 10:07

## 2025-03-03 RX ADMIN — HYDROMORPHONE HYDROCHLORIDE 0.5 MG: 1 INJECTION, SOLUTION INTRAMUSCULAR; INTRAVENOUS; SUBCUTANEOUS at 11:06

## 2025-03-03 RX ADMIN — HYDROMORPHONE HYDROCHLORIDE 0.5 MG: 1 INJECTION, SOLUTION INTRAMUSCULAR; INTRAVENOUS; SUBCUTANEOUS at 12:08

## 2025-03-03 RX ADMIN — ONDANSETRON 4 MG: 2 INJECTION INTRAMUSCULAR; INTRAVENOUS at 10:42

## 2025-03-03 RX ADMIN — FENTANYL CITRATE 100 MCG: 50 INJECTION, SOLUTION INTRAMUSCULAR; INTRAVENOUS at 10:07

## 2025-03-03 ASSESSMENT — PAIN DESCRIPTION - ORIENTATION: ORIENTATION: INNER

## 2025-03-03 ASSESSMENT — PAIN - FUNCTIONAL ASSESSMENT: PAIN_FUNCTIONAL_ASSESSMENT: 0-10

## 2025-03-03 ASSESSMENT — PAIN DESCRIPTION - LOCATION
LOCATION: THROAT
LOCATION: THROAT

## 2025-03-03 ASSESSMENT — PAIN DESCRIPTION - DESCRIPTORS
DESCRIPTORS: BURNING
DESCRIPTORS: ACHING

## 2025-03-03 ASSESSMENT — PAIN SCALES - GENERAL
PAINLEVEL_OUTOF10: 9
PAINLEVEL_OUTOF10: 10

## 2025-03-03 NOTE — BRIEF OP NOTE
Brief Postoperative Note      Patient: Matt Mao  YOB: 1960  MRN: [id number]    Date of Procedure: 3/3/2025    Pre-Op Diagnosis Codes:      * Laryngeal mass [J38.7]    Post-Op Diagnosis: Same       Procedure(s):  DIRECT LARYNGOSCOPY, BIOPSY   INTUBATION (CRITICAL AIRWAY)    Surgeon(s):  Reginald Larsen MD    Assistant:  * No surgical staff found *    Anesthesia: General    Estimated Blood Loss (mL): Minimal    Complications: None    Specimens:   ID Type Source Tests Collected by Time Destination   A : Left Supraglottic Mass Tissue Tissue SURGICAL PATHOLOGY Reginald Larsen MD 3/3/2025 1026        Implants:  * No implants in log *      Drains: * No LDAs found *    Findings:  Infection Present At Time Of Surgery (PATOS) (choose all levels that have infection present):  No infection present  Other Findings: Left supraglottic mass with vocal fold extension    Electronically signed by Reginald Larsen MD on 3/3/2025 at 10:43 AM

## 2025-03-03 NOTE — H&P (VIEW-ONLY)
2/28/2025 7/9/19   Edwar Peck MD   ipratropium (ATROVENT HFA) 17 MCG/ACT inhaler  8/25/17   Edwar Peck MD   levofloxacin (LEVAQUIN) 500 MG tablet levofloxacin 500 mg tablet  Patient not taking: Reported on 2/21/2025    Edwar Peck MD   metroNIDAZOLE (FLAGYL) 500 MG tablet  4/12/19   Edwar Peck MD   naproxen (NAPROSYN) 500 MG tablet  7/8/17   Edwar Peck MD   pantoprazole (PROTONIX) 40 MG tablet  4/23/19   Edwar Peck MD   tetracycline (ACHROMYCIN;SUMYCIN) 500 MG capsule  4/12/19   Edwar Peck MD   Ferrous Sulfate (IRON) 325 (65 Fe) MG TABS TAKE 1 TABLET BY MOUTH ONCE DAILY WITH MEALS FOR 30 DAYS  Patient not taking: Reported on 2/21/2025 5/30/19   Edwar Peck MD   urea (CARMOL) 20 % cream Apply topically as needed.  Patient not taking: Reported on 2/21/2025 7/12/19   Edgar Marmolejo DPM   atorvastatin (LIPITOR) 80 MG tablet 1 tablet  Patient not taking: Reported on 2/28/2025 8/28/17   Edwar Peck MD   metoprolol tartrate (LOPRESSOR) 25 MG tablet Take by mouth  Patient not taking: Reported on 2/28/2025    Edwar Peck MD     Past Medical History    has a past medical history of COPD (chronic obstructive pulmonary disease) (HCC), Full dentures, History of blood transfusion, Hx of blood clots, Hypertension, Laryngeal mass, PAD (peripheral artery disease), Poor historian, Under care of podiatry surgeon, Wears glasses, and Wellness examination.  Past Surgical History   has a past surgical history that includes Leg Surgery (Right); Abdomen surgery (1992); and Colonoscopy (2024).  Social History   reports that he quit smoking about 5 years ago. His smoking use included cigarettes. He started smoking about 25 years ago. He has a 5 pack-year smoking history. He has never used smokeless tobacco.   reports that he does not currently use alcohol.   reports current drug use. Drug: Marijuana (Weed).    Family History  Family

## 2025-03-03 NOTE — ANESTHESIA POSTPROCEDURE EVALUATION
Department of Anesthesiology  Postprocedure Note    Patient: Matt Mao  MRN: [id number]  YOB: 1960  Date of evaluation: 3/3/2025    Procedure Summary       Date: 03/03/25 Room / Location: [address]    Anesthesia Start: 0958 Anesthesia Stop: 1054    Procedure: DIRECT LARYNGOSCOPY, BIOPSY *EXTENDED PACU STAY* Diagnosis:       Laryngeal mass      (Laryngeal mass [J38.7])    Surgeons: Reginald Larsen MD Responsible Provider: Carlos A Aponte MD    Anesthesia Type: general ASA Status: 3            Anesthesia Type: No value filed.    Chad Phase I: Chad Score: 10    Chad Phase II:      Anesthesia Post Evaluation    Patient location during evaluation: PACU  Patient participation: complete - patient participated  Level of consciousness: awake  Airway patency: patent  Nausea & Vomiting: no nausea and no vomiting  Cardiovascular status: blood pressure returned to baseline  Respiratory status: acceptable  Hydration status: euvolemic  Comments: BP (!) 132/50   Pulse 57   Temp 97.7 °F (36.5 °C) (Temporal)   Resp 11   Ht 1.676 m (5' 6\")   Wt 61.7 kg (136 lb)   SpO2 94%   BMI 21.95 kg/m²   Pain management: adequate    No notable events documented.

## 2025-03-03 NOTE — ANESTHESIA PRE PROCEDURE
Nursing notes reviewed   no history of anesthetic complications:   Airway: Mallampati: II  TM distance: >3 FB   Neck ROM: full  Mouth opening: > = 3 FB   Dental:      Comment: dentures    Pulmonary:normal exam  breath sounds clear to auscultation  (+)  COPD:                                    ROS comment: Laryngeal mass   Cardiovascular:  Exercise tolerance: poor (<4 METS)  (+) hypertension:        Rhythm: regular  Rate: normal                    Neuro/Psych:   Negative Neuro/Psych ROS              GI/Hepatic/Renal: Neg GI/Hepatic/Renal ROS            Endo/Other:    (+) blood dyscrasia: anticoagulation therapy:..                 Abdominal:             Vascular:   + DVT.       Other Findings:             Anesthesia Plan      general     ASA 3       Induction: intravenous.    MIPS: Postoperative opioids intended and Prophylactic antiemetics administered.  Anesthetic plan and risks discussed with patient.    Use of blood products discussed with patient whom consented to blood products.    Plan discussed with CRNA.                    Carlos A Aponte MD   3/3/2025

## 2025-03-03 NOTE — H&P
sulfamethoxazole-trimethoprim (BACTRIM DS;SEPTRA DS) 800-160 MG per tablet Take 1 tablet by mouth every other day Patient took Monday Feb 24, 2025   Yes Edwar Peck MD   albuterol sulfate HFA (PROAIR HFA) 108 (90 Base) MCG/ACT inhaler ProAir HFA 90 mcg/actuation aerosol inhaler   INHALE 2 PUFFS BY MOUTH EVERY 4 HOURS AS NEEDED   Yes Edwar Peck MD   ELIQUIS 5 MG TABS tablet  5/1/19  Yes Edwar Peck MD   potassium gluconate 550 (90 K) MG TABS tablet    Yes Edwar Peck MD   Magic Mouthwash (MIRACLE MOUTHWASH) Shake Well; For Oral Use. Lidocaine Viscous 2%; 80mL, Diphenhydramine 12.5MG/5Ml; 80mL, ALUM & MAG HYDROXIDE-SIMETH 200-200-20 MG/5ML; 80mL. 2/28/25   Reginald Larsen MD   cyclobenzaprine (FLEXERIL) 10 MG tablet Take 1 tablet by mouth 3 times daily as needed for Muscle spasms  Patient not taking: Reported on 2/28/2025    Edwar Peck MD   traMADol (ULTRAM) 50 MG tablet Take 1 tablet by mouth every 6 hours as needed for Pain for up to 14 days. Intended supply: 3 days. Take lowest dose possible to manage pain Max Daily Amount: 200 mg  Patient not taking: Reported on 2/28/2025 2/21/25 3/7/25  Reginald Larsen MD   Magic Mouthwash (MIRACLE MOUTHWASH) Swish and swallow 5 mLs 3 times daily as needed for Pain Please refer to pharmacist recommendations for concentrations of above additives.  I defer this decision to the practicing pharmacist  Patient not taking: Reported on 2/28/2025 2/21/25   Reginald Larsen MD   Emollient (CERAVE) CREA cerave       cre  Patient not taking: Reported on 2/21/2025    Edwar Peck MD   EQ STOMACH RELIEF 262 MG/15ML suspension  4/12/19   Edwar Peck MD   clopidogrel (PLAVIX) 75 MG tablet clopidogrel 75 mg tablet  Patient not taking: Reported on 2/28/2025 7/5/17   Edwar Peck MD   hydrOXYzine (ATARAX) 25 MG tablet TAKE 1 TABLET BY MOUTH TWICE DAILY AS NEEDED FOR ITCHING  Patient not taking: Reported on

## 2025-03-04 LAB — SURGICAL PATHOLOGY REPORT: NORMAL

## 2025-03-05 ENCOUNTER — TELEPHONE (OUTPATIENT)
Dept: ONCOLOGY | Age: 65
End: 2025-03-05

## 2025-03-05 ENCOUNTER — TELEPHONE (OUTPATIENT)
Dept: OTOLARYNGOLOGY | Age: 65
End: 2025-03-05

## 2025-03-05 DIAGNOSIS — C32.1 SQUAMOUS CELL CARCINOMA OF SUPRAGLOTTIS (HCC): Primary | ICD-10-CM

## 2025-03-05 NOTE — TELEPHONE ENCOUNTER
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     Plan:  -Patient already scheduled to see oncology and radiation oncology 3/7  -Given findings at time of surgery, patient would be a candidate for total laryngectomy.  Will send referral to Dr. Thomas and Dr. Ramos at [facility]    Reginald Larsen MD  Otolaryngology - Head and Neck Surgery  [facility]

## 2025-03-06 ENCOUNTER — TELEPHONE (OUTPATIENT)
Dept: ONCOLOGY | Age: 65
End: 2025-03-06

## 2025-03-06 NOTE — TELEPHONE ENCOUNTER
Patient called with new appointment on 3/11 with ENT. Ride scheduled with insurance provider.    Transportation details:  [facility] [phone]   7:00am  Confirmation [id number]  Call for return ride

## 2025-03-06 NOTE — TELEPHONE ENCOUNTER
Name: Matt Mao  : 1960  MRN: [id number]    Oncology Navigation Follow-Up Note    Contact Type:  Telephone    Notes: Pt called in to inquire if needs to fast for dual MO/RO appts 3/7.  Instructed pt no need to fast.  Pt stated received call from [employer] to schedule appt.  Upon review of [facility] care everywhere noted pt scheduled for Dr. Ramos consult 3/11.  Notified pt Dr. Larsen placed referral to Dr. Ramos to evaluate for possible surgical intervention.  Pt verbalized understanding & denied questions/concerns.  Encouraged to contact writer prn.  Will continue to follow.      Electronically signed by Ember Dillon RN on 3/6/2025 at 9:02 AM

## 2025-03-07 ENCOUNTER — INITIAL CONSULT (OUTPATIENT)
Dept: ONCOLOGY | Age: 65
End: 2025-03-07
Payer: MEDICARE

## 2025-03-07 ENCOUNTER — HOSPITAL ENCOUNTER (OUTPATIENT)
Dept: RADIATION ONCOLOGY | Age: 65
Discharge: HOME | End: 2025-03-07
Payer: MEDICARE

## 2025-03-07 VITALS
WEIGHT: 135.2 LBS | TEMPERATURE: 97 F | HEART RATE: 75 BPM | SYSTOLIC BLOOD PRESSURE: 111 MMHG | BODY MASS INDEX: 21.82 KG/M2 | RESPIRATION RATE: 18 BRPM | DIASTOLIC BLOOD PRESSURE: 67 MMHG | OXYGEN SATURATION: 98 %

## 2025-03-07 VITALS
SYSTOLIC BLOOD PRESSURE: 111 MMHG | TEMPERATURE: 97 F | BODY MASS INDEX: 21.82 KG/M2 | HEART RATE: 75 BPM | RESPIRATION RATE: 18 BRPM | DIASTOLIC BLOOD PRESSURE: 67 MMHG | WEIGHT: 135.2 LBS | OXYGEN SATURATION: 98 %

## 2025-03-07 DIAGNOSIS — C32.1 MALIGNANT NEOPLASM OF SUPRAGLOTTIS (HCC): Primary | ICD-10-CM

## 2025-03-07 PROCEDURE — 1123F ACP DISCUSS/DSCN MKR DOCD: CPT | Performed by: INTERNAL MEDICINE

## 2025-03-07 PROCEDURE — 1036F TOBACCO NON-USER: CPT | Performed by: INTERNAL MEDICINE

## 2025-03-07 PROCEDURE — 3017F COLORECTAL CA SCREEN DOC REV: CPT | Performed by: INTERNAL MEDICINE

## 2025-03-07 PROCEDURE — G8427 DOCREV CUR MEDS BY ELIG CLIN: HCPCS | Performed by: INTERNAL MEDICINE

## 2025-03-07 PROCEDURE — G8420 CALC BMI NORM PARAMETERS: HCPCS | Performed by: INTERNAL MEDICINE

## 2025-03-07 PROCEDURE — 99213 OFFICE O/P EST LOW 20 MIN: CPT | Performed by: RADIOLOGY

## 2025-03-07 PROCEDURE — 99205 OFFICE O/P NEW HI 60 MIN: CPT | Performed by: INTERNAL MEDICINE

## 2025-03-07 ASSESSMENT — PATIENT HEALTH QUESTIONNAIRE - PHQ9
1. LITTLE INTEREST OR PLEASURE IN DOING THINGS: NOT AT ALL
2. FEELING DOWN, DEPRESSED OR HOPELESS: NOT AT ALL
SUM OF ALL RESPONSES TO PHQ QUESTIONS 1-9: 0

## 2025-03-07 ASSESSMENT — PAIN SCALES - GENERAL: PAINLEVEL_OUTOF10: 9

## 2025-03-07 ASSESSMENT — PAIN DESCRIPTION - LOCATION: LOCATION: THROAT

## 2025-03-07 NOTE — PROGRESS NOTES
Per Dr. Finnegan, pt likely will be recommended to proceed with chemo/radiation treatments concurrently but await outcome of ENT surgery consult.  Patient will need to sign consent for radiation treatments (states able to sign his name on consents per Dr. 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        Patient Pain Score:  9      Pain medications and Reassessment of pain at future time    Goal:  States pain is manageable with oral medications    Current Status:  Continue to reassess at future radiation appts        Mary Jane Al RN 3/7/2025 10:31 AM

## 2025-03-07 NOTE — PROGRESS NOTES
Matt Mao                                                                                                                  3/7/2025  MRN:   [id number]  YOB: 1960  PCP:                           Nolan [employer] PA  Referring Physician: Chava  Treating Physician Name: SHANDA BRADY MD      Reason for consultation:  Chief Complaint   Patient presents with    Consultation     Ref: Malignant neoplasm of glottis    Establish care.  Discussed treatment plan.    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  History of Present Illness  The patient presents for evaluation of a supraglottic tumor.  Approximately 3 weeks ago, he began experiencing symptoms of a sore throat accompanied by changes in his voice. He sought medical attention from his primary care physician, who referred him to an otolaryngologist. However, due to insurance issues, he was unable to see the specialist initially. After 3 attempts, he was finally able to secure an appointment with a specialist who performed a laryngoscopy on 03/04/2025.  Laryngoscopy showed a supraglottic mass which was biopsied and came back as a squamous cell carcinoma. He reports no respiratory distress or difficulty swallowing but continues to experience hoarseness and pain during swallowing. He has not experienced any weight loss and maintains a good appetite. Despite being advised against consuming hamburgers due to their rough texture, he continues to do so.    SOCIAL HISTORY  The patient used to smoke and

## 2025-03-07 NOTE — ACP (ADVANCE CARE PLANNING)
Advance Care Planning     Hospice Services: Patient is not currently receiving hospice services/has not received hospice care within the performance year.    Advance Care Planning was discussed with patient, and he does not have ACP documents on file and Decision Makers/contacts listed in chart.

## 2025-03-07 NOTE — CONSULTS
are taking something for pain every 3 hours), Disp: 120 tablet, Rfl: 1    acetaminophen (TYLENOL) 325 MG tablet, Take 2 tablets by mouth every 6 hours as needed for Pain Alternate with Motrin so that you are taking something for pain every 3 hours, Disp: 120 tablet, Rfl: 1    cyclobenzaprine (FLEXERIL) 10 MG tablet, Take 1 tablet by mouth 3 times daily as needed for Muscle spasms (Patient not taking: Reported on 2/28/2025), Disp: , Rfl:     sildenafil (VIAGRA) 100 MG tablet, Take 1 tablet by mouth as needed for Erectile Dysfunction, Disp: , Rfl:     amLODIPine (NORVASC) 5 MG tablet, Take 2 tablets by mouth daily, Disp: , Rfl:     lisinopril (PRINIVIL;ZESTRIL) 5 MG tablet, Take 1 tablet by mouth daily, Disp: , Rfl:     traMADol (ULTRAM) 50 MG tablet, Take 1 tablet by mouth every 6 hours as needed for Pain for up to 14 days. Intended supply: 3 days. Take lowest dose possible to manage pain Max Daily Amount: 200 mg (Patient not taking: Reported on 2/28/2025), Disp: 30 tablet, Rfl: 0    Magic Mouthwash (MIRACLE MOUTHWASH), Swish and swallow 5 mLs 3 times daily as needed for Pain Please refer to pharmacist recommendations for concentrations of above additives.  I defer this decision to the practicing pharmacist (Patient not taking: Reported on 2/28/2025), Disp: 473 mL, Rfl: 1    aspirin (DEIRDRE ASPIRIN) 325 MG tablet, Patient states takes a full 325 mg ASA, Disp: , Rfl:     Emollient (CERAVE) CREA, cerave       cre (Patient not taking: Reported on 2/21/2025), Disp: , Rfl:     EQ STOMACH RELIEF 262 MG/15ML suspension, , Disp: , Rfl: 0    clopidogrel (PLAVIX) 75 MG tablet, clopidogrel 75 mg tablet (Patient not taking: Reported on 2/28/2025), Disp: , Rfl:     hydrOXYzine (ATARAX) 25 MG tablet, TAKE 1 TABLET BY MOUTH TWICE DAILY AS NEEDED FOR ITCHING (Patient not taking: Reported on 2/28/2025), Disp: , Rfl: 3    ipratropium (ATROVENT HFA) 17 MCG/ACT inhaler, , Disp: , Rfl:     levofloxacin (LEVAQUIN) 500 MG tablet,

## 2025-03-11 ENCOUNTER — TELEPHONE (OUTPATIENT)
Dept: ONCOLOGY | Age: 65
End: 2025-03-11

## 2025-03-11 NOTE — TELEPHONE ENCOUNTER
Name: Matt Mao  : 1960  MRN: [id number]    Oncology Navigation Follow-Up Note    Contact Type:  Telephone    Notes: Dr. Richard Browne's nurse navigator, called in stating pt completed Dr. Ramos consult this am & Dr. Ramos recommending concurrent chemo/XRT.  Dr. Mota updated.  Dr. Mota stated will place chemotherapy & port placement orders.  TERESITA French RO , updated & requested teach/sim scheduled.  Will continue to follow.       Electronically signed by Ember Dillon RN on 3/11/2025 at 8:35 AM

## 2025-03-11 NOTE — TELEPHONE ENCOUNTER
Patient called stating he has appointment 3/14. Ride set up with insurance provider.    Transportation details:  [facility] [phone]   12:00pm  Confirmation [phone]  Call for return ride

## 2025-03-11 NOTE — TELEPHONE ENCOUNTER
Addended by: CAM CARMONA on: 11/2/2020 07:16 AM     Modules accepted: Orders     Does patient need dental clearance prior to CT SIM?

## 2025-03-13 PROBLEM — C32.1 MALIGNANT NEOPLASM OF SUPRAGLOTTIS (HCC): Status: ACTIVE | Noted: 2025-03-13

## 2025-03-14 ENCOUNTER — HOSPITAL ENCOUNTER (OUTPATIENT)
Dept: RADIATION ONCOLOGY | Age: 65
Discharge: HOME | End: 2025-03-14
Payer: MEDICARE

## 2025-03-14 DIAGNOSIS — C32.1 MALIGNANT NEOPLASM OF SUPRAGLOTTIS (HCC): Primary | ICD-10-CM

## 2025-03-14 PROCEDURE — 77470 SPECIAL RADIATION TREATMENT: CPT | Performed by: RADIOLOGY

## 2025-03-14 PROCEDURE — 77334 RADIATION TREATMENT AID(S): CPT | Performed by: RADIOLOGY

## 2025-03-14 RX ORDER — BETAMETHASONE VALERATE 1.2 MG/G
CREAM TOPICAL
Qty: 45 G | Refills: 2 | Status: CANCELLED | OUTPATIENT
Start: 2025-03-14

## 2025-03-14 NOTE — DISCHARGE INSTRUCTIONS
Side Effects of Head and Neck  Fatigue  Hair loss  Mouth changes  Skin changes  Throat changes  Taste changes  Less active thyroid gland    Fatigue  How long it lasts  When you will first feel fatigue depends on a few factors, such as your age, health, how active you are, and how you felt before radiation therapy started.  Fatigue can last from 6 weeks to a year after your last radiation therapy session. Some people may always feel fatigue and not have as much energy as they did before radiation therapy.  Ways to Manage Fatigue    Try to sleep at least 8 hours each night. This may be more sleep than you needed before radiation therapy. One way to sleep better at night is to be active during the day. Another way is to relax right before going to bed. Do calming activities before bedtime, such as reading, working on a [employer] puzzle, or listening to music.  Plan time to rest. Take short naps or rest breaks between activities.  Try not to do too much. With fatigue, you ay not have enough energy to do all the things you want to do. Stay active, but choose the activities that are most important to you. Try to let go of things that don't matter as much now. For example, you might go to work but not do housework. You might watch your children's sprots events but not cook dinner.  Exercise. Research shows that most people feel better when they get some exercise each day. Go for a short walk, ride a bike, or do yoga. Talk with your doctor or nurse about types of exercise you can do while having radiation therapy.  Relax. Mediatation, prayer, gentle yoga, guided imagery, and visualization are ways you can learn to relax and decrease stress.    For relaxation exercises:  Visit Learning to Relax on the National Cancer [place]'s website at : www.cancer.gov/about-cancer/copingfeelings/relaxation  See Facing Forward: Life After Cancer Treatment at: www.cancer.gov/publications/patient-education/facing-forward  Eat and drink well.

## 2025-03-14 NOTE — PROGRESS NOTES
Radiation Treatment Site/Plan/Fractions:  Larynx/35 Fractions Daily      Concurrent Chemotherapy/Immunotherapy:  Yes-concurrent Cisplatin per Dr. Mota      Cardiac Device:  None      Transportation Concerns:  Yes-previous referral to  for help with rides      Nursing Referrals and Reasons: Referral to dietician      Contrast Given:  Yes-92 mls total of Isovue 370 via left antecube IV.  Patient tolerated well with good blood return to IV.            Miscellaneous Information:  Site specific information and skin care reviewed with patient.  He verbalized understanding.  Written information provided for his son to read and review with him if needed as well.  Patient verbalized understanding of information.

## 2025-03-19 ENCOUNTER — HOSPITAL ENCOUNTER (OUTPATIENT)
Dept: RADIATION ONCOLOGY | Age: 65
Discharge: HOME | End: 2025-03-19
Payer: MEDICARE

## 2025-03-19 ENCOUNTER — TELEPHONE (OUTPATIENT)
Dept: ONCOLOGY | Age: 65
End: 2025-03-19

## 2025-03-19 PROCEDURE — 77300 RADIATION THERAPY DOSE PLAN: CPT | Performed by: RADIOLOGY

## 2025-03-19 PROCEDURE — 77338 DESIGN MLC DEVICE FOR IMRT: CPT | Performed by: RADIOLOGY

## 2025-03-19 PROCEDURE — 77301 RADIOTHERAPY DOSE PLAN IMRT: CPT | Performed by: RADIOLOGY

## 2025-03-19 NOTE — TELEPHONE ENCOUNTER
need for a well-balanced /nutrient dense diet +ONS as needed       Goal: Adequate nutrient intake for weight maintenance and to optimize nutrition status while undergoing cancer treatment        Huyen Garcia RD, LD, CNSC  Registered Dietitian  [facility]  [phone]

## 2025-03-19 NOTE — TELEPHONE ENCOUNTER
Patient called stating he had received \"a message from you but it's deleted now.\"  states he has not reached out since last week to discuss ride for his appointment.  reviewed upcoming appointments with patient. Patient states he has  for port placement.  will continue to follow.

## 2025-03-21 ENCOUNTER — TELEPHONE (OUTPATIENT)
Dept: ONCOLOGY | Age: 65
End: 2025-03-21

## 2025-03-21 NOTE — TELEPHONE ENCOUNTER
Patient called stating he received bill from [facility] for ENT appointment.  asked patient to bring bill in to next appointment and he can look at options.  reaching out to ENT office to inquire about billing.

## 2025-03-24 RX ORDER — BETAMETHASONE VALERATE 1.2 MG/G
CREAM TOPICAL
Qty: 45 G | Refills: 2 | Status: SHIPPED | OUTPATIENT
Start: 2025-03-24

## 2025-03-26 ENCOUNTER — HOSPITAL ENCOUNTER (OUTPATIENT)
Dept: INTERVENTIONAL RADIOLOGY/VASCULAR | Age: 65
Discharge: HOME | End: 2025-03-28
Payer: MEDICARE

## 2025-03-26 VITALS
OXYGEN SATURATION: 98 % | DIASTOLIC BLOOD PRESSURE: 63 MMHG | SYSTOLIC BLOOD PRESSURE: 150 MMHG | RESPIRATION RATE: 16 BRPM | TEMPERATURE: 97.3 F | HEART RATE: 67 BPM

## 2025-03-26 DIAGNOSIS — C32.1 MALIGNANT NEOPLASM OF SUPRAGLOTTIS (HCC): ICD-10-CM

## 2025-03-26 LAB
INR PPP: 1
PARTIAL THROMBOPLASTIN TIME: 31.9 SEC (ref 23–36.5)
PLATELET # BLD AUTO: 445 K/UL (ref 138–453)
PROTHROMBIN TIME: 13.1 SEC (ref 11.7–14.9)

## 2025-03-26 PROCEDURE — 6360000002 HC RX W HCPCS: Performed by: PHYSICIAN ASSISTANT

## 2025-03-26 PROCEDURE — 85610 PROTHROMBIN TIME: CPT

## 2025-03-26 PROCEDURE — 36561 INSERT TUNNELED CV CATH: CPT

## 2025-03-26 PROCEDURE — 85730 THROMBOPLASTIN TIME PARTIAL: CPT

## 2025-03-26 PROCEDURE — 77001 FLUOROGUIDE FOR VEIN DEVICE: CPT

## 2025-03-26 PROCEDURE — 6360000002 HC RX W HCPCS: Performed by: RADIOLOGY

## 2025-03-26 PROCEDURE — 76937 US GUIDE VASCULAR ACCESS: CPT

## 2025-03-26 PROCEDURE — 85049 AUTOMATED PLATELET COUNT: CPT

## 2025-03-26 PROCEDURE — C1788 PORT, INDWELLING, IMP: HCPCS

## 2025-03-26 PROCEDURE — 2500000003 HC RX 250 WO HCPCS: Performed by: PHYSICIAN ASSISTANT

## 2025-03-26 RX ORDER — HEPARIN 100 UNIT/ML
SYRINGE INTRAVENOUS PRN
Status: COMPLETED | OUTPATIENT
Start: 2025-03-26 | End: 2025-03-26

## 2025-03-26 RX ORDER — FENTANYL CITRATE 50 UG/ML
INJECTION, SOLUTION INTRAMUSCULAR; INTRAVENOUS PRN
Status: COMPLETED | OUTPATIENT
Start: 2025-03-26 | End: 2025-03-26

## 2025-03-26 RX ORDER — SODIUM CHLORIDE 9 MG/ML
INJECTION, SOLUTION INTRAVENOUS CONTINUOUS
Status: DISCONTINUED | OUTPATIENT
Start: 2025-03-26 | End: 2025-03-29 | Stop reason: HOSPADM

## 2025-03-26 RX ADMIN — FENTANYL CITRATE 50 MCG: 50 INJECTION, SOLUTION INTRAMUSCULAR; INTRAVENOUS at 15:20

## 2025-03-26 RX ADMIN — FENTANYL CITRATE 25 MCG: 50 INJECTION, SOLUTION INTRAMUSCULAR; INTRAVENOUS at 15:26

## 2025-03-26 RX ADMIN — FENTANYL CITRATE 25 MCG: 50 INJECTION, SOLUTION INTRAMUSCULAR; INTRAVENOUS at 15:29

## 2025-03-26 RX ADMIN — CEFAZOLIN 2000 MG: 1 INJECTION, POWDER, FOR SOLUTION INTRAMUSCULAR; INTRAVENOUS at 15:15

## 2025-03-26 RX ADMIN — HEPARIN 5 ML: 100 SYRINGE at 15:27

## 2025-03-26 ASSESSMENT — PAIN SCALES - GENERAL
PAINLEVEL_OUTOF10: 0

## 2025-03-26 ASSESSMENT — PAIN DESCRIPTION - DESCRIPTORS: DESCRIPTORS: DISCOMFORT

## 2025-03-26 ASSESSMENT — PAIN - FUNCTIONAL ASSESSMENT: PAIN_FUNCTIONAL_ASSESSMENT: 0-10

## 2025-03-26 NOTE — INTERVAL H&P NOTE
Pt Name: Matt Mao  MRN: [id number]  YOB: 1960  Date of evaluation: 3/26/2025    I have reviewed the patient's history and physical examination completed in pre-op area prior to DIRECT LARYNGOSCOPY, BIOPSY on 3/3/25.    No changes to history or on examination today, unless noted below.   Patient presents today for scheduled IR port placement. Biopsy was positive for invasive squamous cell carcinoma. He denies any other health changes. He reports his appetite remains good. He reports he does have intermittent SOB. Respirations even, non-labored. Lungs overall diminished, faint bibasilar wheezes.     RBUY MAI, APRN - CNP  3/26/25  1:00 PM

## 2025-03-26 NOTE — OR NURSING
8 fr bard power implanted port to right chest.   Flushed with saline and ordered heparin  Secured with sutures.  Skin glue used  Report called and patient to recover

## 2025-03-26 NOTE — FLOWSHEET NOTE
RN went over discharge papers with patient and son. No further questions. RN sent packet w/info on port home w/patient.

## 2025-03-27 DIAGNOSIS — C32.1 MALIGNANT NEOPLASM OF SUPRAGLOTTIS (HCC): Primary | ICD-10-CM

## 2025-03-27 RX ORDER — HYDROCODONE BITARTRATE AND ACETAMINOPHEN 5; 325 MG/1; MG/1
1 TABLET ORAL EVERY 8 HOURS PRN
Qty: 30 TABLET | Refills: 0 | Status: SHIPPED | OUTPATIENT
Start: 2025-03-27 | End: 2025-04-06

## 2025-03-31 ENCOUNTER — HOSPITAL ENCOUNTER (OUTPATIENT)
Dept: PHYSICAL THERAPY | Facility: CLINIC | Age: 65
Setting detail: THERAPIES SERIES
Discharge: HOME | End: 2025-03-31
Payer: MEDICARE

## 2025-03-31 ENCOUNTER — TELEPHONE (OUTPATIENT)
Dept: ONCOLOGY | Age: 65
End: 2025-03-31

## 2025-03-31 PROCEDURE — 97110 THERAPEUTIC EXERCISES: CPT

## 2025-03-31 PROCEDURE — 97140 MANUAL THERAPY 1/> REGIONS: CPT

## 2025-03-31 PROCEDURE — 97161 PT EVAL LOW COMPLEX 20 MIN: CPT
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
## 2025-03-31 NOTE — TELEPHONE ENCOUNTER
Patient called to clarify upcoming schedule.  reviewed with patient.  called insurance transportation to confirm ride scheduled for today. Insurance confirmed.  set up ride for 4/7 through insurance provider.    Transportation details:  [facility] [phone]   7:45am  Confirmation #[id number]  Call for return ride

## 2025-04-04 ENCOUNTER — TELEPHONE (OUTPATIENT)
Dept: ONCOLOGY | Age: 65
End: 2025-04-04

## 2025-04-06 NOTE — PROGRESS NOTES
Reason for Referral:     No referring provider defined for this encounter.    Chief Complaint   Patient presents with    New Patient     Referred for esophageal lesion. Pt does have cancer        1. Malignant neoplasm of supraglottis (HCC)    2. Laryngeal mass    3. Dysphagia, unspecified type    4. Anemia, unspecified type        HISTORY OF PRESENT ILLNESS: Mr.Michael BRANDON Mao is a 64 y.o. male with a past history remarkable for supraglottic SCC plan for chemo and radiation, referred as new consultation for evaluation of egd for throat cancer.    ENT laryngoscopy showed SCC in the L supraglottic region showed SCC with necrosis March 2025.  Plan for chemo.  Referred to GI to check the esophagus for concomitant abnormality.  Patient denies any GERD or swallowing issues other than pain in the throat.  Regarding lower GI symptoms denies constipation, diarrhea, blood in stool, dark stool.  CBC shows anemia and CMP NL  No swallowing issues. No GERD. No     Previous Endoscopies  Cologuard 2023 negative     Previous GI workup     Patient's PMH/PSH,SH,PSYCH Hx, MEDs, ALLERGIES, and ROS were all reviewed and updated in the appropriate sections.  I did review all the labs results available for the labs which were ordered by the primary care physician, and the other consultants, we search on [place] at [facility] and all the available care everywhere epic  I did review all the imaging studies of the abdomen available on EMR, ordered by the primary care physician and the other consultant  I did review all the pathology from the biopsies done on the previous endoscopies    PAST MEDICAL HISTORY:  Past Medical History:   Diagnosis Date    COPD (chronic obstructive pulmonary disease) (HCC)     Full dentures     Patient states does not wear them    History of blood transfusion     with leg surgery, no adverse reaction    Hx of blood clots     Hypertension     Laryngeal mass 02/21/2025    PAD (peripheral artery disease)     Poor

## 2025-04-07 ENCOUNTER — TELEPHONE (OUTPATIENT)
Dept: ONCOLOGY | Age: 65
End: 2025-04-07

## 2025-04-07 ENCOUNTER — OFFICE VISIT (OUTPATIENT)
Dept: GASTROENTEROLOGY | Age: 65
End: 2025-04-07
Payer: MEDICARE

## 2025-04-07 VITALS
RESPIRATION RATE: 16 BRPM | HEIGHT: 66 IN | WEIGHT: 138.4 LBS | BODY MASS INDEX: 22.24 KG/M2 | DIASTOLIC BLOOD PRESSURE: 53 MMHG | HEART RATE: 99 BPM | OXYGEN SATURATION: 99 % | SYSTOLIC BLOOD PRESSURE: 126 MMHG

## 2025-04-07 DIAGNOSIS — R13.10 DYSPHAGIA, UNSPECIFIED TYPE: ICD-10-CM

## 2025-04-07 DIAGNOSIS — C32.1 MALIGNANT NEOPLASM OF SUPRAGLOTTIS (HCC): Primary | ICD-10-CM

## 2025-04-07 DIAGNOSIS — J38.7 LARYNGEAL MASS: ICD-10-CM

## 2025-04-07 DIAGNOSIS — D64.9 ANEMIA, UNSPECIFIED TYPE: ICD-10-CM

## 2025-04-07 PROCEDURE — G8420 CALC BMI NORM PARAMETERS: HCPCS | Performed by: STUDENT IN AN ORGANIZED HEALTH CARE EDUCATION/TRAINING PROGRAM

## 2025-04-07 PROCEDURE — 1123F ACP DISCUSS/DSCN MKR DOCD: CPT | Performed by: STUDENT IN AN ORGANIZED HEALTH CARE EDUCATION/TRAINING PROGRAM

## 2025-04-07 PROCEDURE — 1036F TOBACCO NON-USER: CPT | Performed by: STUDENT IN AN ORGANIZED HEALTH CARE EDUCATION/TRAINING PROGRAM

## 2025-04-07 PROCEDURE — 99204 OFFICE O/P NEW MOD 45 MIN: CPT | Performed by: STUDENT IN AN ORGANIZED HEALTH CARE EDUCATION/TRAINING PROGRAM

## 2025-04-07 PROCEDURE — 3017F COLORECTAL CA SCREEN DOC REV: CPT | Performed by: STUDENT IN AN ORGANIZED HEALTH CARE EDUCATION/TRAINING PROGRAM

## 2025-04-07 PROCEDURE — G8427 DOCREV CUR MEDS BY ELIG CLIN: HCPCS | Performed by: STUDENT IN AN ORGANIZED HEALTH CARE EDUCATION/TRAINING PROGRAM

## 2025-04-07 NOTE — TELEPHONE ENCOUNTER
Name: Matt Mao  : 1960  MRN: [id number]    Oncology Navigation Follow-Up Note    Contact Type:  Telephone    Notes: Upon review of chart noted pt scheduled for XRT start 4/10 & C1 chemotherapy .  \"Who to Call When\" document mailed.  Will continue to follow.      Electronically signed by Ember Dillon RN on 2025 at 9:45 AM

## 2025-04-07 NOTE — TELEPHONE ENCOUNTER
set up rides for 4/10-11 appointments. Patient updated.    Transportation details:  [facility] [phone]   10:30am 9:30am  Confirmation [phone]   Return ride 12:15pm, call for return 4/11

## 2025-04-09 ENCOUNTER — TELEPHONE (OUTPATIENT)
Dept: ONCOLOGY | Age: 65
End: 2025-04-09

## 2025-04-09 NOTE — TELEPHONE ENCOUNTER
set up transportation through insurance provider for next week (4/14-18). Patient updated.     Transportation details:  [facility] [phone]   10:45am 9:15am (PT then RO) 10:45am 10:45am 10:15am  [facility] [id number] [phone]  Return ride 12:15pm, call for ride 4/18

## 2025-04-10 ENCOUNTER — TELEPHONE (OUTPATIENT)
Dept: INFUSION THERAPY | Age: 65
End: 2025-04-10

## 2025-04-10 ENCOUNTER — HOSPITAL ENCOUNTER (OUTPATIENT)
Dept: RADIATION ONCOLOGY | Age: 65
Discharge: HOME | End: 2025-04-10
Payer: MEDICARE

## 2025-04-10 ENCOUNTER — TELEPHONE (OUTPATIENT)
Dept: ONCOLOGY | Age: 65
End: 2025-04-10

## 2025-04-10 DIAGNOSIS — C32.1 MALIGNANT NEOPLASM OF SUPRAGLOTTIS (HCC): Primary | ICD-10-CM

## 2025-04-10 PROCEDURE — 77386 HC NTSTY MODUL RAD TX DLVR CPLX: CPT | Performed by: RADIOLOGY

## 2025-04-10 NOTE — TELEPHONE ENCOUNTER
Chemotherapy orders received:     Gk=226.6 cm Wt=62.8 kg BSA=1.71  Chemotherapy doses verified:      Cisplatin 40 mg/m2 = 68 mg     Mago Weber RN

## 2025-04-10 NOTE — TELEPHONE ENCOUNTER
Patient called stating he is confused to why he has a ride today if he starts treatment tomorrow.  reviewed schedule with patient, he starts radiation today and chemotherapy tomorrow. Patient states he has podiatry appointment today.  encouraged him to cancel that appointment and go to his radiation appointment to get treatment started.

## 2025-04-10 NOTE — TELEPHONE ENCOUNTER
Chemotherapy orders received:    Bq=212.6 cm Wt=62.8 kg BSA=1.71  Chemotherapy doses verified:     Cisplatin 40 mg/m2 = 68 mg    Geri Lanier RN

## 2025-04-11 ENCOUNTER — TELEPHONE (OUTPATIENT)
Dept: ONCOLOGY | Age: 65
End: 2025-04-11

## 2025-04-11 ENCOUNTER — HOSPITAL ENCOUNTER (OUTPATIENT)
Dept: INFUSION THERAPY | Age: 65
Discharge: HOME | End: 2025-04-11
Payer: MEDICARE

## 2025-04-11 ENCOUNTER — OFFICE VISIT (OUTPATIENT)
Dept: ONCOLOGY | Age: 65
End: 2025-04-11
Payer: MEDICARE

## 2025-04-11 ENCOUNTER — HOSPITAL ENCOUNTER (OUTPATIENT)
Dept: RADIATION ONCOLOGY | Age: 65
Discharge: HOME | End: 2025-04-11
Payer: MEDICARE

## 2025-04-11 VITALS
WEIGHT: 137.8 LBS | BODY MASS INDEX: 22.24 KG/M2 | OXYGEN SATURATION: 99 % | TEMPERATURE: 98.1 F | HEART RATE: 59 BPM | DIASTOLIC BLOOD PRESSURE: 75 MMHG | SYSTOLIC BLOOD PRESSURE: 129 MMHG | RESPIRATION RATE: 18 BRPM

## 2025-04-11 DIAGNOSIS — C32.1 MALIGNANT NEOPLASM OF SUPRAGLOTTIS (HCC): Primary | ICD-10-CM

## 2025-04-11 LAB
ALBUMIN SERPL-MCNC: 4.3 G/DL (ref 3.5–5.2)
ALBUMIN/GLOB SERPL: 1.5 {RATIO} (ref 1–2.5)
ALP SERPL-CCNC: 91 U/L (ref 40–129)
ALT SERPL-CCNC: 15 U/L (ref 5–41)
ANION GAP SERPL CALCULATED.3IONS-SCNC: 8 MMOL/L (ref 9–17)
AST SERPL-CCNC: 21 U/L
BASOPHILS # BLD: 0.1 K/UL (ref 0–0.2)
BASOPHILS NFR BLD: 1 % (ref 0–2)
BILIRUB SERPL-MCNC: 0.2 MG/DL (ref 0.3–1.2)
BUN SERPL-MCNC: 16 MG/DL (ref 8–23)
CALCIUM SERPL-MCNC: 9.3 MG/DL (ref 8.6–10.4)
CHLORIDE SERPL-SCNC: 100 MMOL/L (ref 98–107)
CO2 SERPL-SCNC: 28 MMOL/L (ref 20–31)
CREAT SERPL-MCNC: 0.8 MG/DL (ref 0.7–1.2)
EOSINOPHIL # BLD: 1 K/UL (ref 0–0.4)
EOSINOPHILS RELATIVE PERCENT: 10 % (ref 1–4)
ERYTHROCYTE [DISTWIDTH] IN BLOOD BY AUTOMATED COUNT: 13.6 % (ref 12.5–15.4)
GFR, ESTIMATED: >90 ML/MIN/1.73M2
GLUCOSE SERPL-MCNC: 97 MG/DL (ref 70–99)
HCT VFR BLD AUTO: 35.6 % (ref 41–53)
HGB BLD-MCNC: 11.6 G/DL (ref 13.5–17.5)
LYMPHOCYTES NFR BLD: 1.3 K/UL (ref 1–4.8)
LYMPHOCYTES RELATIVE PERCENT: 13 % (ref 24–44)
MAGNESIUM SERPL-MCNC: 2.1 MG/DL (ref 1.6–2.6)
MCH RBC QN AUTO: 30.8 PG (ref 26–34)
MCHC RBC AUTO-ENTMCNC: 32.7 G/DL (ref 31–37)
MCV RBC AUTO: 94.1 FL (ref 80–100)
MONOCYTES NFR BLD: 0.8 K/UL (ref 0.1–1.2)
MONOCYTES NFR BLD: 9 % (ref 2–11)
NEUTROPHILS NFR BLD: 67 % (ref 36–66)
NEUTS SEG NFR BLD: 6.6 K/UL (ref 1.8–7.7)
PHOSPHATE SERPL-MCNC: 3.7 MG/DL (ref 2.5–4.5)
PLATELET # BLD AUTO: 388 K/UL (ref 140–450)
PMV BLD AUTO: 8.3 FL (ref 6–12)
POTASSIUM SERPL-SCNC: 4.7 MMOL/L (ref 3.7–5.3)
PROT SERPL-MCNC: 7.1 G/DL (ref 6.4–8.3)
RBC # BLD AUTO: 3.78 M/UL (ref 4.5–5.9)
SODIUM SERPL-SCNC: 136 MMOL/L (ref 135–144)
WBC OTHER # BLD: 9.8 K/UL (ref 3.5–11)

## 2025-04-11 PROCEDURE — 77386 HC NTSTY MODUL RAD TX DLVR CPLX: CPT | Performed by: RADIOLOGY

## 2025-04-11 PROCEDURE — G8428 CUR MEDS NOT DOCUMENT: HCPCS | Performed by: INTERNAL MEDICINE

## 2025-04-11 PROCEDURE — G8420 CALC BMI NORM PARAMETERS: HCPCS | Performed by: INTERNAL MEDICINE

## 2025-04-11 PROCEDURE — 99213 OFFICE O/P EST LOW 20 MIN: CPT | Performed by: INTERNAL MEDICINE

## 2025-04-11 PROCEDURE — 96413 CHEMO IV INFUSION 1 HR: CPT

## 2025-04-11 PROCEDURE — 2580000003 HC RX 258: Performed by: INTERNAL MEDICINE

## 2025-04-11 PROCEDURE — 85025 COMPLETE CBC W/AUTO DIFF WBC: CPT

## 2025-04-11 PROCEDURE — 96368 THER/DIAG CONCURRENT INF: CPT

## 2025-04-11 PROCEDURE — 96366 THER/PROPH/DIAG IV INF ADDON: CPT

## 2025-04-11 PROCEDURE — 1036F TOBACCO NON-USER: CPT | Performed by: INTERNAL MEDICINE

## 2025-04-11 PROCEDURE — 1123F ACP DISCUSS/DSCN MKR DOCD: CPT | Performed by: INTERNAL MEDICINE

## 2025-04-11 PROCEDURE — 2500000003 HC RX 250 WO HCPCS: Performed by: INTERNAL MEDICINE

## 2025-04-11 PROCEDURE — 96375 TX/PRO/DX INJ NEW DRUG ADDON: CPT

## 2025-04-11 PROCEDURE — 96367 TX/PROPH/DG ADDL SEQ IV INF: CPT

## 2025-04-11 PROCEDURE — 83735 ASSAY OF MAGNESIUM: CPT

## 2025-04-11 PROCEDURE — 99211 OFF/OP EST MAY X REQ PHY/QHP: CPT | Performed by: INTERNAL MEDICINE

## 2025-04-11 PROCEDURE — 80053 COMPREHEN METABOLIC PANEL: CPT

## 2025-04-11 PROCEDURE — 6360000002 HC RX W HCPCS: Performed by: INTERNAL MEDICINE

## 2025-04-11 PROCEDURE — 84100 ASSAY OF PHOSPHORUS: CPT

## 2025-04-11 PROCEDURE — 3017F COLORECTAL CA SCREEN DOC REV: CPT | Performed by: INTERNAL MEDICINE

## 2025-04-11 RX ORDER — EPINEPHRINE 1 MG/ML
0.3 INJECTION, SOLUTION, CONCENTRATE INTRAVENOUS PRN
Status: CANCELLED | OUTPATIENT
Start: 2025-04-11

## 2025-04-11 RX ORDER — SODIUM CHLORIDE AND POTASSIUM CHLORIDE 150; 900 MG/100ML; MG/100ML
INJECTION, SOLUTION INTRAVENOUS ONCE
Status: COMPLETED | OUTPATIENT
Start: 2025-04-11 | End: 2025-04-11

## 2025-04-11 RX ORDER — ACETAMINOPHEN 325 MG/1
650 TABLET ORAL
Status: CANCELLED | OUTPATIENT
Start: 2025-04-11

## 2025-04-11 RX ORDER — HEPARIN 100 UNIT/ML
500 SYRINGE INTRAVENOUS PRN
Status: DISCONTINUED | OUTPATIENT
Start: 2025-04-11 | End: 2025-04-12 | Stop reason: HOSPADM

## 2025-04-11 RX ORDER — DEXAMETHASONE SODIUM PHOSPHATE 10 MG/ML
10 INJECTION, SOLUTION INTRAMUSCULAR; INTRAVENOUS ONCE
Status: COMPLETED | OUTPATIENT
Start: 2025-04-11 | End: 2025-04-11

## 2025-04-11 RX ORDER — MAGNESIUM SULFATE 1 G/100ML
1000 INJECTION INTRAVENOUS ONCE
Status: COMPLETED | OUTPATIENT
Start: 2025-04-11 | End: 2025-04-11

## 2025-04-11 RX ORDER — ONDANSETRON 2 MG/ML
8 INJECTION INTRAMUSCULAR; INTRAVENOUS
Status: CANCELLED | OUTPATIENT
Start: 2025-04-11

## 2025-04-11 RX ORDER — SODIUM CHLORIDE 9 MG/ML
5-250 INJECTION, SOLUTION INTRAVENOUS PRN
Status: CANCELLED | OUTPATIENT
Start: 2025-04-11

## 2025-04-11 RX ORDER — ONDANSETRON 8 MG/1
4 TABLET, ORALLY DISINTEGRATING ORAL EVERY 8 HOURS PRN
Qty: 90 TABLET | Refills: 3 | Status: SHIPPED | OUTPATIENT
Start: 2025-04-11

## 2025-04-11 RX ORDER — SODIUM CHLORIDE 9 MG/ML
INJECTION, SOLUTION INTRAVENOUS CONTINUOUS
Status: CANCELLED | OUTPATIENT
Start: 2025-04-11

## 2025-04-11 RX ORDER — HYDROCORTISONE SODIUM SUCCINATE 100 MG/2ML
100 INJECTION INTRAMUSCULAR; INTRAVENOUS
Status: CANCELLED | OUTPATIENT
Start: 2025-04-11

## 2025-04-11 RX ORDER — PROCHLORPERAZINE EDISYLATE 5 MG/ML
5 INJECTION INTRAMUSCULAR; INTRAVENOUS
Status: CANCELLED | OUTPATIENT
Start: 2025-04-11

## 2025-04-11 RX ORDER — FAMOTIDINE 10 MG/ML
20 INJECTION, SOLUTION INTRAVENOUS
Status: CANCELLED | OUTPATIENT
Start: 2025-04-11

## 2025-04-11 RX ORDER — SODIUM CHLORIDE 9 MG/ML
5-250 INJECTION, SOLUTION INTRAVENOUS PRN
Status: DISCONTINUED | OUTPATIENT
Start: 2025-04-11 | End: 2025-04-12 | Stop reason: HOSPADM

## 2025-04-11 RX ORDER — MEPERIDINE HYDROCHLORIDE 50 MG/ML
12.5 INJECTION INTRAMUSCULAR; INTRAVENOUS; SUBCUTANEOUS PRN
Status: CANCELLED | OUTPATIENT
Start: 2025-04-11

## 2025-04-11 RX ORDER — PALONOSETRON 0.05 MG/ML
0.25 INJECTION, SOLUTION INTRAVENOUS ONCE
Status: CANCELLED | OUTPATIENT
Start: 2025-04-11 | End: 2025-04-11

## 2025-04-11 RX ORDER — DIPHENHYDRAMINE HYDROCHLORIDE 50 MG/ML
50 INJECTION, SOLUTION INTRAMUSCULAR; INTRAVENOUS
Status: CANCELLED | OUTPATIENT
Start: 2025-04-11

## 2025-04-11 RX ORDER — ALBUTEROL SULFATE 90 UG/1
4 INHALANT RESPIRATORY (INHALATION) PRN
Status: CANCELLED | OUTPATIENT
Start: 2025-04-11

## 2025-04-11 RX ORDER — PALONOSETRON 0.05 MG/ML
0.25 INJECTION, SOLUTION INTRAVENOUS ONCE
Status: COMPLETED | OUTPATIENT
Start: 2025-04-11 | End: 2025-04-11

## 2025-04-11 RX ORDER — SODIUM CHLORIDE 0.9 % (FLUSH) 0.9 %
5-40 SYRINGE (ML) INJECTION PRN
Status: DISCONTINUED | OUTPATIENT
Start: 2025-04-11 | End: 2025-04-12 | Stop reason: HOSPADM

## 2025-04-11 RX ADMIN — MAGNESIUM SULFATE HEPTAHYDRATE 1000 MG: 1 INJECTION, SOLUTION INTRAVENOUS at 14:45

## 2025-04-11 RX ADMIN — CISPLATIN 68 MG: 1 INJECTION, SOLUTION INTRAVENOUS at 13:25

## 2025-04-11 RX ADMIN — SODIUM CHLORIDE 115 ML/HR: 0.9 INJECTION, SOLUTION INTRAVENOUS at 11:36

## 2025-04-11 RX ADMIN — HEPARIN 500 UNITS: 100 SYRINGE at 15:41

## 2025-04-11 RX ADMIN — PALONOSETRON 0.25 MG: 0.05 INJECTION, SOLUTION INTRAVENOUS at 12:40

## 2025-04-11 RX ADMIN — DEXAMETHASONE SODIUM PHOSPHATE 10 MG: 10 INJECTION INTRAMUSCULAR; INTRAVENOUS at 12:41

## 2025-04-11 RX ADMIN — MAGNESIUM SULFATE HEPTAHYDRATE 1000 MG: 1 INJECTION, SOLUTION INTRAVENOUS at 11:37

## 2025-04-11 RX ADMIN — SODIUM CHLORIDE, PRESERVATIVE FREE 10 ML: 5 INJECTION INTRAVENOUS at 15:41

## 2025-04-11 RX ADMIN — SODIUM CHLORIDE 150 MG: 0.9 INJECTION, SOLUTION INTRAVENOUS at 12:54

## 2025-04-11 RX ADMIN — POTASSIUM CHLORIDE AND SODIUM CHLORIDE: 900; 150 INJECTION, SOLUTION INTRAVENOUS at 11:37

## 2025-04-11 RX ADMIN — SODIUM CHLORIDE, PRESERVATIVE FREE 10 ML: 5 INJECTION INTRAVENOUS at 10:48

## 2025-04-11 NOTE — PROGRESS NOTES
Matt and his son were here for chemotherapy teaching. Spent 45 minutes with them.  Teaching sheets from chemoexperts and verbal information given on chemotherapy agents, action, administration and side effects.    Chemotherapy medications discussed: cisplatin    Pregnancy warnings reviewed: N/A    Chemotherapy consent form signed by patient.    Provided new patient binder, Chemotherapy and You booklet, Eating Hints booklet      Port placement: 3/26/25  Pt has prescription for EMLA cream and was given instructions on use.    Reviewed take home medication: N/A    Questions answered and support given.    [facility] rehab referral assessment form, distress tool form and PG-SGA form completed by patient.    Needs that were identified during teaching visit: No needs identified at this time.    Discussed community resources such as [place], [facility], [facility], [employer], American Cancer Society, etc.    Pt will return on 4/18/25 for C1D8 cisplatin and f/u with Dr. Mota on 4/18/25.

## 2025-04-11 NOTE — PROGRESS NOTES
Matt Mao                                                                                                                  4/11/2025  MRN:   [id number]  YOB: 1960  PCP:                           Nolan Felix PA  Referring Physician: No ref. 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  History of Present Illness  The patient presents for evaluation of a supraglottic tumor.  Approximately 3 weeks ago, he began experiencing symptoms of a sore throat accompanied by changes in his voice. He sought medical attention from his primary care physician, who referred him to an otolaryngologist. However, due to insurance issues, he was unable to see the specialist initially. After 3 attempts, he was finally able to secure an appointment with a specialist who performed a laryngoscopy on 03/04/2025.  Laryngoscopy showed a supraglottic mass which was biopsied and came back as a squamous cell carcinoma. He reports no respiratory distress or difficulty swallowing but continues to experience hoarseness and pain during swallowing. He has not experienced any weight loss and maintains a good appetite. Despite being advised against consuming hamburgers due to their rough texture, he continues to do so.    SOCIAL HISTORY  The patient used to smoke

## 2025-04-11 NOTE — PROGRESS NOTES
Pt here for C1D1 cisplatin.  Arrives ambulatory.  Denies any new complaints.  Labs drawn from port, results reviewed.  Pt was seen by Dr. Mota, order rec'd to proceed with tx.  Tx complete without incident.  Pt d/c'd in stable condition.  Returns 4/18/25 for MD visit and C1D8 cisplatin.

## 2025-04-11 NOTE — TELEPHONE ENCOUNTER
Patient brought in bill and letter from insurance provider.  called insurance provider about bill who called billing. Bill had not been run through Medicaid, just Medicare and would be resubmitted. Letter from insurance provider just showing patient approved for upcoming treatment.     met with patient during infusion and reviewed paperwork he brought in. Patient states nervous about treatment but feels better now that he is starting.  offered emotional support.  reviewed rides for next week.

## 2025-04-11 NOTE — TELEPHONE ENCOUNTER
Name: Matt Mao  : 1960  MRN: [id number]    Oncology Navigation Follow-Up Note    Contact Type:  Medical Oncology    Notes: Pt @ [facility] for Dr. Mota f/u & C1 chemotherapy.  Met w/pt & pt's son prior to appt.  Pt verbalized feeling anxious r/t first tx, support given.  Pt denied questions/concerns.  Encouraged to contact writer prn.  Will continue to follow.      Electronically signed by Ember Dillon RN on 2025 at 11:34 AM

## 2025-04-14 ENCOUNTER — TELEPHONE (OUTPATIENT)
Dept: INFUSION THERAPY | Age: 65
End: 2025-04-14

## 2025-04-14 ENCOUNTER — HOSPITAL ENCOUNTER (OUTPATIENT)
Dept: RADIATION ONCOLOGY | Age: 65
Discharge: HOME | End: 2025-04-14
Payer: MEDICARE

## 2025-04-14 PROCEDURE — 77386 HC NTSTY MODUL RAD TX DLVR CPLX: CPT | Performed by: RADIOLOGY

## 2025-04-14 NOTE — TELEPHONE ENCOUNTER
Pt stopped into to office stating he was having issues with stomach cramps and diarrhea over the weekend. He states the issues has resolved, but he is asking what to do next time. Writer states for pt to get imodium OTC to have on hand and use if he experiences diarrhea again. He verbalized understanding and will do so.

## 2025-04-15 ENCOUNTER — TELEPHONE (OUTPATIENT)
Dept: ONCOLOGY | Age: 65
End: 2025-04-15

## 2025-04-15 ENCOUNTER — HOSPITAL ENCOUNTER (OUTPATIENT)
Dept: PHYSICAL THERAPY | Facility: CLINIC | Age: 65
Setting detail: THERAPIES SERIES
Discharge: HOME | End: 2025-04-15
Payer: MEDICARE

## 2025-04-15 ENCOUNTER — HOSPITAL ENCOUNTER (OUTPATIENT)
Dept: RADIATION ONCOLOGY | Age: 65
Discharge: HOME | End: 2025-04-15
Payer: MEDICARE

## 2025-04-15 PROCEDURE — 97140 MANUAL THERAPY 1/> REGIONS: CPT

## 2025-04-15 PROCEDURE — 77386 HC NTSTY MODUL RAD TX DLVR CPLX: CPT | Performed by: RADIOLOGY

## 2025-04-15 PROCEDURE — 97110 THERAPEUTIC EXERCISES: CPT

## 2025-04-15 NOTE — TELEPHONE ENCOUNTER
set up transportation for next week (4/21-25). Patient updated.    Transportation details:  [facility] [phone]   10:30am 10:00am (4/25)  Confirmation [phone]  Return ride 12:15pm, 4/22 12:15 to PT then 2:00pm home, 4/25 call for ride

## 2025-04-15 NOTE — FLOWSHEET NOTE
[] [facility]  Outpatient Rehabilitation &  Therapy  [address]  [phone]  [phone] [] [facility]  Outpatient Rehabilitation &  Therapy  [address]  P: [phone]  F: [phone] [x] [facility]  Outpatient Rehabilitation &  Therapy  [address]  P: [phone]  F: [phone] [] [facility]  Outpatient Rehabilitation &  Therapy  [address]  [phone]  [phone] [] [facility]  Outpatient Rehabilitation &  Therapy  [address]   P: [phone]  F: [phone]  [] [facility]  Outpatient Rehabilitation &  Therapy  [address]  P: [phone]  F: [phone] [] [facility]  Outpatient Rehabilitation &  Therapy  [address]  P: [phone]  F: [phone] [] [facility]  Outpatient Rehabilitation &  Therapy  [address]  P: [phone]  F: [phone] [] [facility]  Outpatient Rehabilitation &  Therapy  [address]  P: [phone]  F: [phone]  [] [facility] Outpatient Rehabilitation &  Therapy  [address]  P: [phone]  F: [phone]     Physical Therapy Daily Treatment Note    Date:  4/15/2025  Patient Name:  Matt Mao    :  1960  MRN: [id number]  Physician: Shelley Finnegan          Insurance: [facility] MEDICARE DUAL, [facility] Medicaid  visits BMN  Medical Diagnosis: C32.1 Malignant neoplasm of supraglottis         Rehab Codes:  25.611, 25.612, R53.0, L90.5, R29.3   Onset date: 3/14/25 script                Next Dr's appt.: ongoing     Visit# / total visits: ; Progress note for Medicare patient due at visit 10     Cancels/No Shows: 0    Subjective:  Pt reports he started treatment last week, erma well so far. Has been doing his ex and able to move his R

## 2025-04-16 ENCOUNTER — HOSPITAL ENCOUNTER (OUTPATIENT)
Dept: RADIATION ONCOLOGY | Age: 65
Discharge: HOME | End: 2025-04-16
Payer: MEDICARE

## 2025-04-16 ENCOUNTER — CLINICAL DOCUMENTATION (OUTPATIENT)
Dept: ONCOLOGY | Age: 65
End: 2025-04-16

## 2025-04-16 VITALS
OXYGEN SATURATION: 98 % | DIASTOLIC BLOOD PRESSURE: 78 MMHG | WEIGHT: 138.8 LBS | SYSTOLIC BLOOD PRESSURE: 137 MMHG | BODY MASS INDEX: 22.4 KG/M2 | RESPIRATION RATE: 16 BRPM | TEMPERATURE: 98.2 F | HEART RATE: 91 BPM

## 2025-04-16 DIAGNOSIS — C32.1 MALIGNANT NEOPLASM OF SUPRAGLOTTIS (HCC): Primary | ICD-10-CM

## 2025-04-16 PROCEDURE — 77336 RADIATION PHYSICS CONSULT: CPT | Performed by: RADIOLOGY

## 2025-04-16 PROCEDURE — 77386 HC NTSTY MODUL RAD TX DLVR CPLX: CPT | Performed by: RADIOLOGY

## 2025-04-16 RX ORDER — HYDROCODONE BITARTRATE AND ACETAMINOPHEN 5; 325 MG/1; MG/1
1 TABLET ORAL EVERY 8 HOURS PRN
Qty: 60 TABLET | Refills: 0 | Status: SHIPPED | OUTPATIENT
Start: 2025-04-16 | End: 2025-05-16

## 2025-04-16 NOTE — PROGRESS NOTES
[facility]- MEDICAL NUTRITION THERAPY     Visit Type: Follow-up     NUTRITION RECOMMENDATIONS / MONITORING / EVALUATION  Continue Regular diet as tolerated. Continue use of oral nutrition supplement 1x/day, increasing if needed.   Will continue to monitor PO tolerance, adequacy of intake, weight, and care plans.     Subjective/Current Data:  Matt Mao is a 64 y.o. 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   Pt reports appetite/intake is good. C/o sore throat but still able to consume a regular diet ok at present. Pt also drinks 1 Boost shake/day. Weight is stable.   Pt reports diarrhea after chemotherapy, but resolved after few days. Has Imodium at home to use as needed. Discussed choosing bland foods when experiencing diarrhea.     Objective Data:  Patient Active Problem List    Diagnosis Date Noted    Malignant neoplasm of supraglottis (HCC) 03/13/2025    Laryngeal mass 02/21/2025     Anthropometric Measures:  Height: 5' 6\"  Weight: 138 lb (62.7 kg) per office scale today  Ideal Body Weight: 142 lb (64.5 kg)  BMI: 22.33 kg/m2 (Normal Weight)    Wt Readings from Last 20 Encounters:   04/11/25 62.5 kg (137 lb 12.8 oz)   04/07/25 62.8 kg (138 lb 6.4 oz)   03/07/25 61.3 kg (135 lb 3.2 oz)   03/07/25 61.3 kg (135 lb 3.2 oz)   02/28/25 61.7 kg (136 lb)   02/21/25 61.1 kg (134 lb 12.8 oz)   10/01/21 62.6 kg (138 lb)   08/20/21 61.7 kg (136 lb)   06/25/21 64.9 kg (143 lb)   05/21/21 65.3 kg (144 lb)   02/19/21 65.8 kg (145 lb)   10/23/20 67.6 kg (149 lb)   05/15/20 67.1 kg (148 lb)   01/17/20 65.2 kg (143 lb 12.8 oz)   10/11/19 66.7 kg (147 lb)   07/12/19 67.5 kg (148 lb 12.8 oz)   06/28/19 67.1 kg (148 lb)     Estimated Nutrition Needs:  Estimated Calorie Needs: 1800 kcal/day   Estimated Protein Needs: 95 g pro/day      Current Nutrition Regimen:  Oral Diet: Regular   Oral Supplement: Boost 1x/day       Nutrition Diagnosis and

## 2025-04-16 NOTE — PROGRESS NOTES
[facility]       Radiation Oncology          [address] [place] [address]        O: [phone]        F: [phone]       [facility]             RADIATION ONCOLOGY WEEKLY PROGRESS NOTE  Patient ID:   Matt Mao  : 1960   MRN: [id number]    Location:  [facility] Radiation Oncology,   [address] [place] [address] Rd[address] [place] [address]   [phone]    DIAGNOSIS:  Squamous cell carcinoma of the supraglottic larynx T2 N1 M0    TREATMENT DETAILS:  Treatment Site: Larynx + LN  Actual Dose: 1000cGy  Total Planned Dose: 7000cGy  Treatment Technique: IMRT  Fraction Technique: Daily  Therapy imaging monitoring: CBCT daily  Concurrent Chemotherapy: Weekly cisplatin    SUBJECTIVE:   Patient seen for their weekly on treatment evaluation today.  Doing well, reports unchanged throat pain, tolerating oral diet and maintaining his weight    OBJECTIVE:     ECO Asymptomatic    VITAL SIGNS: /78   Pulse 91   Temp 98.2 °F (36.8 °C) (Temporal)   Resp 16   Wt 63 kg (138 lb 12.8 oz)   SpO2 98%   BMI 22.40 kg/m²   Wt Readings from Last 5 Encounters:   25 63 kg (138 lb 12.8 oz)   25 62.5 kg (137 lb 12.8 oz)   25 62.8 kg (138 lb 6.4 oz)   25 61.3 kg (135 lb 3.2 oz)   25 61.3 kg (135 lb 3.2 oz)     GENERAL:  General appearance is that of a well-nourished, well-developed in no apparent distress.  HEENT: No mucositis or ulceration in the oral cavity, no radiation dermatitis is noted  HEART:  Normal rate and regular rhythm  LUNGS:  Pulmonary effort normal.  ABDOMEN:  Soft, nontender, non distended  EXTREMITIES:  No edema.  No calf tenderness.  MSK:  No spinal tenderness. Normal ROM.  NEUROLOGICAL: Alert and oriented. Strength and sensation intact bilaterally. No focal deficits.   PSYCH: Mood normal, behavior normal.      LABS:  WBC   Date Value Ref Range Status   2025 9.8 3.5 - 11.0 k/uL Final   2018 9.6 3.5 -

## 2025-04-16 NOTE — PROGRESS NOTES
Matt TAYLOR Mayco  4/16/2025  Wt Readings from Last 3 Encounters:   04/16/25 63 kg (138 lb 12.8 oz)   04/11/25 62.5 kg (137 lb 12.8 oz)   04/07/25 62.8 kg (138 lb 6.4 oz)     Body mass index is 22.4 kg/m².        Treatment Area:Larynx (with chemo)    Patient was seen today for weekly visit.     Comfort Alteration  Fatigue: Mild    Mucous Membrane Alteration  Mucositis Due to Radiation: No  Thrush: No  Voice Changes:Yes- raspy    Nutritional Alteration  Anorexia: No   Nausea: No  Vomiting: No     Ventilation Alteration  Cough:Occasional    Skin Alteration   Sensation:intact    Radiation Dermatitis:  Intact [x]     Erythema  []     Discoloration  [x]   slight  Rash []     Dry desquamation  []     Moist desquamation []       Emotional  Coping: effective      Injury, potential bleeding or infection: skin care BID reinforced    Lab Results   Component Value Date    WBC 9.8 04/11/2025    HGB 11.6 (L) 04/11/2025    HCT 35.6 (L) 04/11/2025     04/11/2025         /78   Pulse 91   Temp 98.2 °F (36.8 °C) (Temporal)   Resp 16   Wt 63 kg (138 lb 12.8 oz)   SpO2 98%   BMI 22.40 kg/m²      Pain Assessment: None - Denies Pain            Assessment/Plan: Patient was seen today for weekly visit.  He arrives ambulatory with steady gait.  He reports \"mild soreness when swallowing\".  He is using Magic Mouthwash but states mor relief from baking soda/salt rinses.  Patient reports eating all textures of foods and taking one Boost supplement daily.  Skin to neck area with slight discoloration.  Patient reports completing skin care twice daily as directed.  Dr. Finnegan evaluated patient.  Continue plan of care.    Elsie Pereira RN

## 2025-04-17 ENCOUNTER — HOSPITAL ENCOUNTER (OUTPATIENT)
Dept: RADIATION ONCOLOGY | Age: 65
Discharge: HOME | End: 2025-04-17
Payer: MEDICARE

## 2025-04-17 PROCEDURE — 77386 HC NTSTY MODUL RAD TX DLVR CPLX: CPT | Performed by: RADIOLOGY

## 2025-04-18 ENCOUNTER — TELEPHONE (OUTPATIENT)
Dept: ONCOLOGY | Age: 65
End: 2025-04-18

## 2025-04-18 ENCOUNTER — HOSPITAL ENCOUNTER (OUTPATIENT)
Dept: INFUSION THERAPY | Age: 65
Discharge: HOME | End: 2025-04-18
Payer: MEDICARE

## 2025-04-18 ENCOUNTER — HOSPITAL ENCOUNTER (OUTPATIENT)
Dept: RADIATION ONCOLOGY | Age: 65
Discharge: HOME | End: 2025-04-18
Payer: MEDICARE

## 2025-04-18 ENCOUNTER — OFFICE VISIT (OUTPATIENT)
Dept: ONCOLOGY | Age: 65
End: 2025-04-18

## 2025-04-18 VITALS
WEIGHT: 137.4 LBS | SYSTOLIC BLOOD PRESSURE: 159 MMHG | OXYGEN SATURATION: 95 % | HEART RATE: 87 BPM | BODY MASS INDEX: 22.18 KG/M2 | DIASTOLIC BLOOD PRESSURE: 69 MMHG | TEMPERATURE: 97.7 F | RESPIRATION RATE: 18 BRPM

## 2025-04-18 DIAGNOSIS — Z51.11 CHEMOTHERAPY MANAGEMENT, ENCOUNTER FOR: ICD-10-CM

## 2025-04-18 DIAGNOSIS — C32.1 MALIGNANT NEOPLASM OF SUPRAGLOTTIS (HCC): Primary | ICD-10-CM

## 2025-04-18 DIAGNOSIS — H92.01 RIGHT EAR PAIN: ICD-10-CM

## 2025-04-18 LAB
ALBUMIN SERPL-MCNC: 4.3 G/DL (ref 3.5–5.2)
ALBUMIN/GLOB SERPL: 1.5 {RATIO} (ref 1–2.5)
ALP SERPL-CCNC: 93 U/L (ref 40–129)
ALT SERPL-CCNC: 14 U/L (ref 5–41)
ANION GAP SERPL CALCULATED.3IONS-SCNC: 11 MMOL/L (ref 9–17)
AST SERPL-CCNC: 22 U/L
BASOPHILS # BLD: 0.1 K/UL (ref 0–0.2)
BASOPHILS NFR BLD: 1 % (ref 0–2)
BILIRUB SERPL-MCNC: 0.3 MG/DL (ref 0.3–1.2)
BUN SERPL-MCNC: 20 MG/DL (ref 8–23)
CALCIUM SERPL-MCNC: 9 MG/DL (ref 8.6–10.4)
CHLORIDE SERPL-SCNC: 98 MMOL/L (ref 98–107)
CO2 SERPL-SCNC: 26 MMOL/L (ref 20–31)
CREAT SERPL-MCNC: 1 MG/DL (ref 0.7–1.2)
EOSINOPHIL # BLD: 0.6 K/UL (ref 0–0.4)
EOSINOPHILS RELATIVE PERCENT: 5 % (ref 1–4)
ERYTHROCYTE [DISTWIDTH] IN BLOOD BY AUTOMATED COUNT: 13.8 % (ref 12.5–15.4)
GFR, ESTIMATED: 84 ML/MIN/1.73M2
GLUCOSE SERPL-MCNC: 122 MG/DL (ref 70–99)
HCT VFR BLD AUTO: 35.4 % (ref 41–53)
HGB BLD-MCNC: 11.6 G/DL (ref 13.5–17.5)
LYMPHOCYTES NFR BLD: 1.2 K/UL (ref 1–4.8)
LYMPHOCYTES RELATIVE PERCENT: 10 % (ref 24–44)
MAGNESIUM SERPL-MCNC: 1.9 MG/DL (ref 1.6–2.6)
MCH RBC QN AUTO: 30.9 PG (ref 26–34)
MCHC RBC AUTO-ENTMCNC: 32.7 G/DL (ref 31–37)
MCV RBC AUTO: 94.5 FL (ref 80–100)
MONOCYTES NFR BLD: 1 K/UL (ref 0.1–1.2)
MONOCYTES NFR BLD: 8 % (ref 2–11)
NEUTROPHILS NFR BLD: 76 % (ref 36–66)
NEUTS SEG NFR BLD: 9.7 K/UL (ref 1.8–7.7)
PLATELET # BLD AUTO: 417 K/UL (ref 140–450)
PMV BLD AUTO: 8.5 FL (ref 6–12)
POTASSIUM SERPL-SCNC: 4.9 MMOL/L (ref 3.7–5.3)
PROT SERPL-MCNC: 7.1 G/DL (ref 6.4–8.3)
RBC # BLD AUTO: 3.75 M/UL (ref 4.5–5.9)
SODIUM SERPL-SCNC: 135 MMOL/L (ref 135–144)
WBC OTHER # BLD: 12.6 K/UL (ref 3.5–11)

## 2025-04-18 PROCEDURE — 83735 ASSAY OF MAGNESIUM: CPT

## 2025-04-18 PROCEDURE — 77386 HC NTSTY MODUL RAD TX DLVR CPLX: CPT | Performed by: RADIOLOGY

## 2025-04-18 PROCEDURE — 6360000002 HC RX W HCPCS: Performed by: INTERNAL MEDICINE

## 2025-04-18 PROCEDURE — 85025 COMPLETE CBC W/AUTO DIFF WBC: CPT

## 2025-04-18 PROCEDURE — 96375 TX/PRO/DX INJ NEW DRUG ADDON: CPT

## 2025-04-18 PROCEDURE — 96366 THER/PROPH/DIAG IV INF ADDON: CPT

## 2025-04-18 PROCEDURE — 96368 THER/DIAG CONCURRENT INF: CPT

## 2025-04-18 PROCEDURE — 2500000003 HC RX 250 WO HCPCS: Performed by: INTERNAL MEDICINE

## 2025-04-18 PROCEDURE — 96413 CHEMO IV INFUSION 1 HR: CPT

## 2025-04-18 PROCEDURE — 96367 TX/PROPH/DG ADDL SEQ IV INF: CPT

## 2025-04-18 PROCEDURE — 80053 COMPREHEN METABOLIC PANEL: CPT

## 2025-04-18 PROCEDURE — 2580000003 HC RX 258: Performed by: INTERNAL MEDICINE

## 2025-04-18 RX ORDER — EPINEPHRINE 1 MG/ML
0.3 INJECTION, SOLUTION, CONCENTRATE INTRAVENOUS PRN
OUTPATIENT
Start: 2025-05-09

## 2025-04-18 RX ORDER — ACETAMINOPHEN 325 MG/1
650 TABLET ORAL
OUTPATIENT
Start: 2025-05-16

## 2025-04-18 RX ORDER — HYDROCORTISONE SODIUM SUCCINATE 100 MG/2ML
100 INJECTION INTRAMUSCULAR; INTRAVENOUS
OUTPATIENT
Start: 2025-05-09

## 2025-04-18 RX ORDER — HYDROCORTISONE SODIUM SUCCINATE 100 MG/2ML
100 INJECTION INTRAMUSCULAR; INTRAVENOUS
OUTPATIENT
Start: 2025-04-25

## 2025-04-18 RX ORDER — PROCHLORPERAZINE EDISYLATE 5 MG/ML
5 INJECTION INTRAMUSCULAR; INTRAVENOUS
OUTPATIENT
Start: 2025-04-25

## 2025-04-18 RX ORDER — ALBUTEROL SULFATE 90 UG/1
4 INHALANT RESPIRATORY (INHALATION) PRN
Status: CANCELLED | OUTPATIENT
Start: 2025-04-18

## 2025-04-18 RX ORDER — DEXAMETHASONE SODIUM PHOSPHATE 4 MG/ML
1 INJECTION, SOLUTION INTRA-ARTICULAR; INTRALESIONAL; INTRAMUSCULAR; INTRAVENOUS; SOFT TISSUE ONCE
Status: COMPLETED | OUTPATIENT
Start: 2025-04-18 | End: 2025-04-18

## 2025-04-18 RX ORDER — PALONOSETRON 0.05 MG/ML
0.25 INJECTION, SOLUTION INTRAVENOUS ONCE
Status: COMPLETED | OUTPATIENT
Start: 2025-04-18 | End: 2025-04-18

## 2025-04-18 RX ORDER — SODIUM CHLORIDE 9 MG/ML
5-250 INJECTION, SOLUTION INTRAVENOUS PRN
OUTPATIENT
Start: 2025-05-23

## 2025-04-18 RX ORDER — PROCHLORPERAZINE EDISYLATE 5 MG/ML
5 INJECTION INTRAMUSCULAR; INTRAVENOUS
OUTPATIENT
Start: 2025-05-23

## 2025-04-18 RX ORDER — EPINEPHRINE 1 MG/ML
0.3 INJECTION, SOLUTION, CONCENTRATE INTRAVENOUS PRN
Status: CANCELLED | OUTPATIENT
Start: 2025-04-18

## 2025-04-18 RX ORDER — ACETAMINOPHEN 325 MG/1
650 TABLET ORAL
OUTPATIENT
Start: 2025-05-02

## 2025-04-18 RX ORDER — FAMOTIDINE 10 MG/ML
20 INJECTION, SOLUTION INTRAVENOUS
OUTPATIENT
Start: 2025-05-09

## 2025-04-18 RX ORDER — MEPERIDINE HYDROCHLORIDE 50 MG/ML
12.5 INJECTION INTRAMUSCULAR; INTRAVENOUS; SUBCUTANEOUS PRN
OUTPATIENT
Start: 2025-05-16

## 2025-04-18 RX ORDER — SODIUM CHLORIDE 0.9 % (FLUSH) 0.9 %
5-40 SYRINGE (ML) INJECTION PRN
OUTPATIENT
Start: 2025-04-25

## 2025-04-18 RX ORDER — PALONOSETRON 0.05 MG/ML
0.25 INJECTION, SOLUTION INTRAVENOUS ONCE
OUTPATIENT
Start: 2025-04-25 | End: 2025-04-25

## 2025-04-18 RX ORDER — DIPHENHYDRAMINE HYDROCHLORIDE 50 MG/ML
50 INJECTION, SOLUTION INTRAMUSCULAR; INTRAVENOUS
Status: CANCELLED | OUTPATIENT
Start: 2025-04-18

## 2025-04-18 RX ORDER — FAMOTIDINE 10 MG/ML
20 INJECTION, SOLUTION INTRAVENOUS
OUTPATIENT
Start: 2025-05-23

## 2025-04-18 RX ORDER — ONDANSETRON 2 MG/ML
8 INJECTION INTRAMUSCULAR; INTRAVENOUS
OUTPATIENT
Start: 2025-04-25

## 2025-04-18 RX ORDER — DEXTROSE MONOHYDRATE, SODIUM CHLORIDE, AND POTASSIUM CHLORIDE 50; 1.49; 9 G/1000ML; G/1000ML; G/1000ML
INJECTION, SOLUTION INTRAVENOUS CONTINUOUS
Status: DISCONTINUED | OUTPATIENT
Start: 2025-04-18 | End: 2025-04-19 | Stop reason: HOSPADM

## 2025-04-18 RX ORDER — SODIUM CHLORIDE 9 MG/ML
5-250 INJECTION, SOLUTION INTRAVENOUS PRN
OUTPATIENT
Start: 2025-05-09

## 2025-04-18 RX ORDER — PALONOSETRON 0.05 MG/ML
0.25 INJECTION, SOLUTION INTRAVENOUS ONCE
Status: CANCELLED | OUTPATIENT
Start: 2025-04-18 | End: 2025-04-18

## 2025-04-18 RX ORDER — MAGNESIUM SULFATE 1 G/100ML
1000 INJECTION INTRAVENOUS ONCE
Status: COMPLETED | OUTPATIENT
Start: 2025-04-18 | End: 2025-04-18

## 2025-04-18 RX ORDER — HEPARIN SODIUM (PORCINE) LOCK FLUSH IV SOLN 100 UNIT/ML 100 UNIT/ML
500 SOLUTION INTRAVENOUS PRN
OUTPATIENT
Start: 2025-05-02

## 2025-04-18 RX ORDER — MEPERIDINE HYDROCHLORIDE 50 MG/ML
12.5 INJECTION INTRAMUSCULAR; INTRAVENOUS; SUBCUTANEOUS PRN
Status: CANCELLED | OUTPATIENT
Start: 2025-04-18

## 2025-04-18 RX ORDER — SODIUM CHLORIDE 0.9 % (FLUSH) 0.9 %
5-40 SYRINGE (ML) INJECTION PRN
OUTPATIENT
Start: 2025-05-23

## 2025-04-18 RX ORDER — EPINEPHRINE 1 MG/ML
0.3 INJECTION, SOLUTION, CONCENTRATE INTRAVENOUS PRN
OUTPATIENT
Start: 2025-04-25

## 2025-04-18 RX ORDER — EPINEPHRINE 1 MG/ML
0.3 INJECTION, SOLUTION, CONCENTRATE INTRAVENOUS PRN
OUTPATIENT
Start: 2025-05-16

## 2025-04-18 RX ORDER — HEPARIN SODIUM (PORCINE) LOCK FLUSH IV SOLN 100 UNIT/ML 100 UNIT/ML
500 SOLUTION INTRAVENOUS PRN
OUTPATIENT
Start: 2025-05-16

## 2025-04-18 RX ORDER — PROCHLORPERAZINE EDISYLATE 5 MG/ML
5 INJECTION INTRAMUSCULAR; INTRAVENOUS
OUTPATIENT
Start: 2025-05-09

## 2025-04-18 RX ORDER — FAMOTIDINE 10 MG/ML
20 INJECTION, SOLUTION INTRAVENOUS
OUTPATIENT
Start: 2025-04-25

## 2025-04-18 RX ORDER — SODIUM CHLORIDE 9 MG/ML
5-250 INJECTION, SOLUTION INTRAVENOUS PRN
Status: DISCONTINUED | OUTPATIENT
Start: 2025-04-18 | End: 2025-04-19 | Stop reason: HOSPADM

## 2025-04-18 RX ORDER — SODIUM CHLORIDE 9 MG/ML
5-250 INJECTION, SOLUTION INTRAVENOUS PRN
OUTPATIENT
Start: 2025-05-16

## 2025-04-18 RX ORDER — ONDANSETRON 2 MG/ML
8 INJECTION INTRAMUSCULAR; INTRAVENOUS
OUTPATIENT
Start: 2025-05-16

## 2025-04-18 RX ORDER — ALBUTEROL SULFATE 90 UG/1
4 INHALANT RESPIRATORY (INHALATION) PRN
OUTPATIENT
Start: 2025-04-25

## 2025-04-18 RX ORDER — ONDANSETRON 2 MG/ML
8 INJECTION INTRAMUSCULAR; INTRAVENOUS
OUTPATIENT
Start: 2025-05-02

## 2025-04-18 RX ORDER — FAMOTIDINE 10 MG/ML
20 INJECTION, SOLUTION INTRAVENOUS
OUTPATIENT
Start: 2025-05-16

## 2025-04-18 RX ORDER — FAMOTIDINE 10 MG/ML
20 INJECTION, SOLUTION INTRAVENOUS
OUTPATIENT
Start: 2025-05-02

## 2025-04-18 RX ORDER — SODIUM CHLORIDE 0.9 % (FLUSH) 0.9 %
5-40 SYRINGE (ML) INJECTION PRN
Status: DISCONTINUED | OUTPATIENT
Start: 2025-04-18 | End: 2025-04-19 | Stop reason: HOSPADM

## 2025-04-18 RX ORDER — MEPERIDINE HYDROCHLORIDE 50 MG/ML
12.5 INJECTION INTRAMUSCULAR; INTRAVENOUS; SUBCUTANEOUS PRN
OUTPATIENT
Start: 2025-05-23

## 2025-04-18 RX ORDER — FAMOTIDINE 10 MG/ML
20 INJECTION, SOLUTION INTRAVENOUS
Status: CANCELLED | OUTPATIENT
Start: 2025-04-18

## 2025-04-18 RX ORDER — HEPARIN 100 UNIT/ML
500 SYRINGE INTRAVENOUS PRN
Status: DISCONTINUED | OUTPATIENT
Start: 2025-04-18 | End: 2025-04-19 | Stop reason: HOSPADM

## 2025-04-18 RX ORDER — EPINEPHRINE 1 MG/ML
0.3 INJECTION, SOLUTION, CONCENTRATE INTRAVENOUS PRN
OUTPATIENT
Start: 2025-05-02

## 2025-04-18 RX ORDER — MEPERIDINE HYDROCHLORIDE 50 MG/ML
12.5 INJECTION INTRAMUSCULAR; INTRAVENOUS; SUBCUTANEOUS PRN
OUTPATIENT
Start: 2025-05-02

## 2025-04-18 RX ORDER — PALONOSETRON 0.05 MG/ML
0.25 INJECTION, SOLUTION INTRAVENOUS ONCE
OUTPATIENT
Start: 2025-05-02 | End: 2025-05-02

## 2025-04-18 RX ORDER — MEPERIDINE HYDROCHLORIDE 50 MG/ML
12.5 INJECTION INTRAMUSCULAR; INTRAVENOUS; SUBCUTANEOUS PRN
OUTPATIENT
Start: 2025-04-25

## 2025-04-18 RX ORDER — ALBUTEROL SULFATE 90 UG/1
4 INHALANT RESPIRATORY (INHALATION) PRN
OUTPATIENT
Start: 2025-05-02

## 2025-04-18 RX ORDER — MEPERIDINE HYDROCHLORIDE 50 MG/ML
12.5 INJECTION INTRAMUSCULAR; INTRAVENOUS; SUBCUTANEOUS PRN
OUTPATIENT
Start: 2025-05-09

## 2025-04-18 RX ORDER — ACETAMINOPHEN 325 MG/1
650 TABLET ORAL
Status: CANCELLED | OUTPATIENT
Start: 2025-04-18

## 2025-04-18 RX ORDER — SODIUM CHLORIDE 9 MG/ML
INJECTION, SOLUTION INTRAVENOUS CONTINUOUS
OUTPATIENT
Start: 2025-05-23

## 2025-04-18 RX ORDER — ACETAMINOPHEN 325 MG/1
650 TABLET ORAL
OUTPATIENT
Start: 2025-04-25

## 2025-04-18 RX ORDER — HYDROCORTISONE SODIUM SUCCINATE 100 MG/2ML
100 INJECTION INTRAMUSCULAR; INTRAVENOUS
OUTPATIENT
Start: 2025-05-23

## 2025-04-18 RX ORDER — ALBUTEROL SULFATE 90 UG/1
4 INHALANT RESPIRATORY (INHALATION) PRN
OUTPATIENT
Start: 2025-05-16

## 2025-04-18 RX ORDER — ONDANSETRON 2 MG/ML
8 INJECTION INTRAMUSCULAR; INTRAVENOUS
Status: CANCELLED | OUTPATIENT
Start: 2025-04-18

## 2025-04-18 RX ORDER — SODIUM CHLORIDE 9 MG/ML
INJECTION, SOLUTION INTRAVENOUS CONTINUOUS
OUTPATIENT
Start: 2025-05-09

## 2025-04-18 RX ORDER — SODIUM CHLORIDE 9 MG/ML
INJECTION, SOLUTION INTRAVENOUS CONTINUOUS
Status: CANCELLED | OUTPATIENT
Start: 2025-04-18

## 2025-04-18 RX ORDER — EPINEPHRINE 1 MG/ML
0.3 INJECTION, SOLUTION, CONCENTRATE INTRAVENOUS PRN
OUTPATIENT
Start: 2025-05-23

## 2025-04-18 RX ORDER — ACETAMINOPHEN 325 MG/1
650 TABLET ORAL
OUTPATIENT
Start: 2025-05-09

## 2025-04-18 RX ORDER — ONDANSETRON 2 MG/ML
8 INJECTION INTRAMUSCULAR; INTRAVENOUS
OUTPATIENT
Start: 2025-05-09

## 2025-04-18 RX ORDER — HEPARIN SODIUM (PORCINE) LOCK FLUSH IV SOLN 100 UNIT/ML 100 UNIT/ML
500 SOLUTION INTRAVENOUS PRN
OUTPATIENT
Start: 2025-05-09

## 2025-04-18 RX ORDER — SODIUM CHLORIDE 9 MG/ML
INJECTION, SOLUTION INTRAVENOUS CONTINUOUS
OUTPATIENT
Start: 2025-05-16

## 2025-04-18 RX ORDER — SODIUM CHLORIDE 0.9 % (FLUSH) 0.9 %
5-40 SYRINGE (ML) INJECTION PRN
OUTPATIENT
Start: 2025-05-16

## 2025-04-18 RX ORDER — ALBUTEROL SULFATE 90 UG/1
4 INHALANT RESPIRATORY (INHALATION) PRN
OUTPATIENT
Start: 2025-05-23

## 2025-04-18 RX ORDER — DIPHENHYDRAMINE HYDROCHLORIDE 50 MG/ML
50 INJECTION, SOLUTION INTRAMUSCULAR; INTRAVENOUS
OUTPATIENT
Start: 2025-05-16

## 2025-04-18 RX ORDER — PALONOSETRON 0.05 MG/ML
0.25 INJECTION, SOLUTION INTRAVENOUS ONCE
OUTPATIENT
Start: 2025-05-23 | End: 2025-05-23

## 2025-04-18 RX ORDER — SODIUM CHLORIDE 9 MG/ML
5-250 INJECTION, SOLUTION INTRAVENOUS PRN
OUTPATIENT
Start: 2025-04-25

## 2025-04-18 RX ORDER — SODIUM CHLORIDE 9 MG/ML
5-250 INJECTION, SOLUTION INTRAVENOUS PRN
OUTPATIENT
Start: 2025-05-02

## 2025-04-18 RX ORDER — ONDANSETRON 2 MG/ML
8 INJECTION INTRAMUSCULAR; INTRAVENOUS
OUTPATIENT
Start: 2025-05-23

## 2025-04-18 RX ORDER — DIPHENHYDRAMINE HYDROCHLORIDE 50 MG/ML
50 INJECTION, SOLUTION INTRAMUSCULAR; INTRAVENOUS
OUTPATIENT
Start: 2025-04-25

## 2025-04-18 RX ORDER — SODIUM CHLORIDE 0.9 % (FLUSH) 0.9 %
5-40 SYRINGE (ML) INJECTION PRN
OUTPATIENT
Start: 2025-05-09

## 2025-04-18 RX ORDER — HYDROCORTISONE SODIUM SUCCINATE 100 MG/2ML
100 INJECTION INTRAMUSCULAR; INTRAVENOUS
OUTPATIENT
Start: 2025-05-02

## 2025-04-18 RX ORDER — PROCHLORPERAZINE EDISYLATE 5 MG/ML
5 INJECTION INTRAMUSCULAR; INTRAVENOUS
OUTPATIENT
Start: 2025-05-02

## 2025-04-18 RX ORDER — HEPARIN SODIUM (PORCINE) LOCK FLUSH IV SOLN 100 UNIT/ML 100 UNIT/ML
500 SOLUTION INTRAVENOUS PRN
OUTPATIENT
Start: 2025-05-23

## 2025-04-18 RX ORDER — PROCHLORPERAZINE EDISYLATE 5 MG/ML
5 INJECTION INTRAMUSCULAR; INTRAVENOUS
OUTPATIENT
Start: 2025-05-16

## 2025-04-18 RX ORDER — PALONOSETRON 0.05 MG/ML
0.25 INJECTION, SOLUTION INTRAVENOUS ONCE
OUTPATIENT
Start: 2025-05-09 | End: 2025-05-09

## 2025-04-18 RX ORDER — SODIUM CHLORIDE 0.9 % (FLUSH) 0.9 %
5-40 SYRINGE (ML) INJECTION PRN
OUTPATIENT
Start: 2025-05-02

## 2025-04-18 RX ORDER — HEPARIN SODIUM (PORCINE) LOCK FLUSH IV SOLN 100 UNIT/ML 100 UNIT/ML
500 SOLUTION INTRAVENOUS PRN
OUTPATIENT
Start: 2025-04-25

## 2025-04-18 RX ORDER — PROCHLORPERAZINE EDISYLATE 5 MG/ML
5 INJECTION INTRAMUSCULAR; INTRAVENOUS
Status: CANCELLED | OUTPATIENT
Start: 2025-04-18

## 2025-04-18 RX ORDER — SODIUM CHLORIDE 9 MG/ML
INJECTION, SOLUTION INTRAVENOUS CONTINUOUS
OUTPATIENT
Start: 2025-05-02

## 2025-04-18 RX ORDER — ALBUTEROL SULFATE 90 UG/1
4 INHALANT RESPIRATORY (INHALATION) PRN
OUTPATIENT
Start: 2025-05-09

## 2025-04-18 RX ORDER — DIPHENHYDRAMINE HYDROCHLORIDE 50 MG/ML
50 INJECTION, SOLUTION INTRAMUSCULAR; INTRAVENOUS
OUTPATIENT
Start: 2025-05-02

## 2025-04-18 RX ORDER — HYDROCORTISONE SODIUM SUCCINATE 100 MG/2ML
100 INJECTION INTRAMUSCULAR; INTRAVENOUS
OUTPATIENT
Start: 2025-05-16

## 2025-04-18 RX ORDER — SODIUM CHLORIDE 9 MG/ML
INJECTION, SOLUTION INTRAVENOUS CONTINUOUS
OUTPATIENT
Start: 2025-04-25

## 2025-04-18 RX ORDER — HYDROCORTISONE SODIUM SUCCINATE 100 MG/2ML
100 INJECTION INTRAMUSCULAR; INTRAVENOUS
Status: CANCELLED | OUTPATIENT
Start: 2025-04-18

## 2025-04-18 RX ORDER — SODIUM CHLORIDE 9 MG/ML
5-250 INJECTION, SOLUTION INTRAVENOUS PRN
Status: CANCELLED | OUTPATIENT
Start: 2025-04-18

## 2025-04-18 RX ORDER — DIPHENHYDRAMINE HYDROCHLORIDE 50 MG/ML
50 INJECTION, SOLUTION INTRAMUSCULAR; INTRAVENOUS
OUTPATIENT
Start: 2025-05-23

## 2025-04-18 RX ORDER — PALONOSETRON 0.05 MG/ML
0.25 INJECTION, SOLUTION INTRAVENOUS ONCE
OUTPATIENT
Start: 2025-05-16 | End: 2025-05-16

## 2025-04-18 RX ORDER — DIPHENHYDRAMINE HYDROCHLORIDE 50 MG/ML
50 INJECTION, SOLUTION INTRAMUSCULAR; INTRAVENOUS
OUTPATIENT
Start: 2025-05-09

## 2025-04-18 RX ORDER — ACETAMINOPHEN 325 MG/1
650 TABLET ORAL
OUTPATIENT
Start: 2025-05-23

## 2025-04-18 RX ADMIN — SODIUM CHLORIDE, PRESERVATIVE FREE 10 ML: 5 INJECTION INTRAVENOUS at 15:34

## 2025-04-18 RX ADMIN — DEXAMETHASONE SODIUM PHOSPHATE 1 MG: 4 INJECTION INTRA-ARTICULAR; INTRALESIONAL; INTRAMUSCULAR; INTRAVENOUS; SOFT TISSUE at 12:25

## 2025-04-18 RX ADMIN — MAGNESIUM SULFATE HEPTAHYDRATE 1000 MG: 1 INJECTION, SOLUTION INTRAVENOUS at 10:57

## 2025-04-18 RX ADMIN — SODIUM CHLORIDE 150 ML/HR: 0.9 INJECTION, SOLUTION INTRAVENOUS at 10:55

## 2025-04-18 RX ADMIN — MAGNESIUM SULFATE HEPTAHYDRATE 1000 MG: 1 INJECTION, SOLUTION INTRAVENOUS at 14:33

## 2025-04-18 RX ADMIN — DEXTROSE, SODIUM CHLORIDE, AND POTASSIUM CHLORIDE: 5; .9; .15 INJECTION INTRAVENOUS at 10:58

## 2025-04-18 RX ADMIN — SODIUM CHLORIDE, PRESERVATIVE FREE 10 ML: 5 INJECTION INTRAVENOUS at 10:49

## 2025-04-18 RX ADMIN — CISPLATIN 68 MG: 1 INJECTION, SOLUTION INTRAVENOUS at 13:21

## 2025-04-18 RX ADMIN — PALONOSETRON 0.25 MG: 0.05 INJECTION, SOLUTION INTRAVENOUS at 12:25

## 2025-04-18 RX ADMIN — HEPARIN 500 UNITS: 100 SYRINGE at 15:34

## 2025-04-18 RX ADMIN — SODIUM CHLORIDE 150 MG: 0.9 INJECTION, SOLUTION INTRAVENOUS at 12:36

## 2025-04-18 NOTE — PROGRESS NOTES
Matt Mao                                                                                                                  4/18/2025  MRN:   [id number]  YOB: 1960  PCP:                           Nolan Felix PA  Referring Physician: No ref. 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  Patient complains of right ear pain.    A new onset of sharp, shooting pain originating from the ear and radiating up to the neck is reported. Headaches have been experienced. No difficulty swallowing food has been noted. Pain management is achieved with medication taken every 8 hours, typically in the evening, with 2 tablets at night aiding in pain relief and sleep induction. No medication refills are required at this time. Hydration is maintained by consuming tea. No fever, chills, or ear drainage are reported. Additionally, no swelling in the neck or face is observed. Efforts are made to keep the ears clean. Increased drooling during sleep is noted, but not during the day.    Weight has decreased. A mouth sore was present but is almost resolved. Magic mouthwash is used.        Summary of Case/History:  Matt Mao a 64 y.o.male is a patient with squamous cell carcinoma of supraglottic larynx presents to the clinic to establish care and for further workup and evaluation.  History of Present

## 2025-04-18 NOTE — PROGRESS NOTES
Pt here for C1D8.  Arrives ambulatory.  Denies any new complaints.  Labs drawn from port, results reviewed.  Pt was seen by Dr. Mota, order rec'd to proceed with tx.  Tx complete without incident.  Pt d/c'd in stable condition.  Returns 4/25/25 for office visit w/ Nakul and C1D15.

## 2025-04-18 NOTE — TELEPHONE ENCOUNTER
Name: Matt Mao  : 1960  MRN: [id number]    Oncology Navigation Follow-Up Note    Contact Type:  Medical Oncology    Notes: Pt @ [facility] for Dr. Mota f/u & tx.  Pt c/o ear pain.  Instructed pt to discuss further w/Dr. Mota during f/u.  Pt verbalized understanding & denied further questions/concerns.  Encouraged to contact writer prn.  Will continue to follow.      Electronically signed by Ember Dillon RN on 2025 at 10:42 AM

## 2025-04-21 ENCOUNTER — HOSPITAL ENCOUNTER (OUTPATIENT)
Dept: SPEECH THERAPY | Age: 65
Setting detail: THERAPIES SERIES
Discharge: HOME | End: 2025-04-21
Payer: MEDICARE

## 2025-04-21 ENCOUNTER — HOSPITAL ENCOUNTER (OUTPATIENT)
Dept: RADIATION ONCOLOGY | Age: 65
Discharge: HOME | End: 2025-04-21
Payer: MEDICARE

## 2025-04-21 PROCEDURE — 77386 HC NTSTY MODUL RAD TX DLVR CPLX: CPT | Performed by: RADIOLOGY

## 2025-04-21 PROCEDURE — 92526 ORAL FUNCTION THERAPY: CPT

## 2025-04-21 NOTE — PROGRESS NOTES
Pt. Problem: Undergoing radiation therapy for head and neck CA,      S: Matt Mao was seen by speech language pathology today after a referral from Dr. Rosa for training in range of motion and strengthening exercises of the tongue, jaw and larynx.  Matt Mao is undergoing radiation for head and neck cancer.  Pt reports he is tolerating 100% oral diet well, occasional cough with thin liquids but not frequently. No difficulty with solids.   Pain 0/10     O/A: Upon evaluation, Matt Mao presents with functional labial and lingual range of motion and strength. Matt Mao speech is 100% intelligible.        Matt Mao was provided with information and potential swallowing difficulties were discussed related to radiation treatment.  An oral motor range of motion program was provided and explained in detail to Matt Mao.  The program was completed during the session with the supervision of the SLP.  Matt Mao verbalized understanding and returned demonstration of each exercise.      P: It is recommended Matt Moa complete this program independently with the provided instructions 3-5 times daily prior to, throughout and 3-6 months following radiation treatment.  If pt experiences worsening swallowing difficulty during or after treatment, recommend modified barium swallow study.    Goal: Independent and preventative exercises to maximize swallow function throughout radiation therapy.      Thank you for this referral.         Kori Barrios, SLP, M.S. [facility]

## 2025-04-22 ENCOUNTER — TELEPHONE (OUTPATIENT)
Dept: ONCOLOGY | Age: 65
End: 2025-04-22

## 2025-04-22 ENCOUNTER — HOSPITAL ENCOUNTER (OUTPATIENT)
Dept: RADIATION ONCOLOGY | Age: 65
Discharge: HOME | End: 2025-04-22
Payer: MEDICARE

## 2025-04-22 ENCOUNTER — HOSPITAL ENCOUNTER (OUTPATIENT)
Dept: PHYSICAL THERAPY | Facility: CLINIC | Age: 65
Setting detail: THERAPIES SERIES
Discharge: HOME | End: 2025-04-22
Payer: MEDICARE

## 2025-04-22 PROCEDURE — 97140 MANUAL THERAPY 1/> REGIONS: CPT

## 2025-04-22 PROCEDURE — 97110 THERAPEUTIC EXERCISES: CPT

## 2025-04-22 PROCEDURE — 77386 HC NTSTY MODUL RAD TX DLVR CPLX: CPT | Performed by: RADIOLOGY

## 2025-04-22 NOTE — TELEPHONE ENCOUNTER
set up transportation for next week's treatment (4/28-5/2). Patient updated.     Transportation details:  [facility] [phone]   10:30am 9:00am (PT to RO) 10:30am 10:30am 10:00am  Confirmation [id number] [id number] [phone]  Return ride 12:15pm, call on 5/2

## 2025-04-22 NOTE — FLOWSHEET NOTE
[] [facility]  Outpatient Rehabilitation &  Therapy  [address]  [phone]  [phone] [] [facility]  Outpatient Rehabilitation &  Therapy  [address]  P: [phone]  F: [phone] [x] [facility]  Outpatient Rehabilitation &  Therapy  [address]  P: [phone]  F: [phone] [] [facility]  Outpatient Rehabilitation &  Therapy  [address]  [phone]  [phone] [] [facility]  Outpatient Rehabilitation &  Therapy  [address]   P: [phone]  F: [phone]  [] [facility]  Outpatient Rehabilitation &  Therapy  [address]  P: [phone]  F: [phone] [] [facility]  Outpatient Rehabilitation &  Therapy  [address]  P: [phone]  F: [phone] [] [facility]  Outpatient Rehabilitation &  Therapy  [address]  P: [phone]  F: [phone] [] [facility]  Outpatient Rehabilitation &  Therapy  [address]  P: [phone]  F: [phone]  [] [facility] Outpatient Rehabilitation &  Therapy  [address]  P: [phone]  F: [phone]     Physical Therapy Daily Treatment Note    Date:  2025  Patient Name:  Matt Mao    :  1960  MRN: [id number]  Physician: Shelley Finnegan          Insurance: [facility] MEDICARE DUAL, [facility] Medicaid  visits BMN  Medical Diagnosis: C32.1 Malignant neoplasm of supraglottis         Rehab Codes:  25.611, 25.612, R53.0, L90.5, R29.3   Onset date: 3/14/25 script                Next Dr's appt.: ongoing     Visit# / total visits: 3/18; Progress note for Medicare patient due at visit 10     Cancels/No Shows: 0    Subjective:  Pt presents with sig throat pain, states he plans on calling the dr when he gets home. Not tried to eat

## 2025-04-23 ENCOUNTER — TELEPHONE (OUTPATIENT)
Dept: ONCOLOGY | Age: 65
End: 2025-04-23

## 2025-04-23 ENCOUNTER — HOSPITAL ENCOUNTER (OUTPATIENT)
Dept: RADIATION ONCOLOGY | Age: 65
Discharge: HOME | End: 2025-04-23
Payer: MEDICARE

## 2025-04-23 ENCOUNTER — HOSPITAL ENCOUNTER (OUTPATIENT)
Dept: INFUSION THERAPY | Age: 65
Discharge: HOME | End: 2025-04-23
Payer: MEDICARE

## 2025-04-23 VITALS
TEMPERATURE: 98.1 F | HEART RATE: 104 BPM | DIASTOLIC BLOOD PRESSURE: 77 MMHG | SYSTOLIC BLOOD PRESSURE: 160 MMHG | OXYGEN SATURATION: 97 % | RESPIRATION RATE: 16 BRPM | BODY MASS INDEX: 20.95 KG/M2 | WEIGHT: 129.8 LBS

## 2025-04-23 VITALS
HEART RATE: 104 BPM | OXYGEN SATURATION: 97 % | DIASTOLIC BLOOD PRESSURE: 77 MMHG | TEMPERATURE: 98.1 F | SYSTOLIC BLOOD PRESSURE: 160 MMHG | BODY MASS INDEX: 20.95 KG/M2 | WEIGHT: 129.8 LBS | RESPIRATION RATE: 16 BRPM

## 2025-04-23 DIAGNOSIS — C32.1 MALIGNANT NEOPLASM OF SUPRAGLOTTIS (HCC): Primary | ICD-10-CM

## 2025-04-23 PROCEDURE — 77386 HC NTSTY MODUL RAD TX DLVR CPLX: CPT | Performed by: RADIOLOGY

## 2025-04-23 PROCEDURE — 96360 HYDRATION IV INFUSION INIT: CPT

## 2025-04-23 PROCEDURE — 2500000003 HC RX 250 WO HCPCS: Performed by: RADIOLOGY

## 2025-04-23 PROCEDURE — 6360000002 HC RX W HCPCS: Performed by: RADIOLOGY

## 2025-04-23 PROCEDURE — 2580000003 HC RX 258: Performed by: RADIOLOGY

## 2025-04-23 PROCEDURE — 77336 RADIATION PHYSICS CONSULT: CPT | Performed by: RADIOLOGY

## 2025-04-23 RX ORDER — HEPARIN 100 UNIT/ML
500 SYRINGE INTRAVENOUS PRN
Status: DISCONTINUED | OUTPATIENT
Start: 2025-04-23 | End: 2025-04-24 | Stop reason: HOSPADM

## 2025-04-23 RX ORDER — DIPHENHYDRAMINE HYDROCHLORIDE 50 MG/ML
50 INJECTION, SOLUTION INTRAMUSCULAR; INTRAVENOUS
Status: CANCELLED | OUTPATIENT
Start: 2025-04-23

## 2025-04-23 RX ORDER — SODIUM CHLORIDE 9 MG/ML
INJECTION, SOLUTION INTRAVENOUS CONTINUOUS
Status: CANCELLED | OUTPATIENT
Start: 2025-04-23

## 2025-04-23 RX ORDER — FAMOTIDINE 10 MG/ML
20 INJECTION, SOLUTION INTRAVENOUS
Status: CANCELLED | OUTPATIENT
Start: 2025-04-23

## 2025-04-23 RX ORDER — EPINEPHRINE 1 MG/ML
0.3 INJECTION, SOLUTION, CONCENTRATE INTRAVENOUS PRN
Status: CANCELLED | OUTPATIENT
Start: 2025-04-23

## 2025-04-23 RX ORDER — ALBUTEROL SULFATE 90 UG/1
4 INHALANT RESPIRATORY (INHALATION) PRN
Status: CANCELLED | OUTPATIENT
Start: 2025-04-23

## 2025-04-23 RX ORDER — HYDROCORTISONE SODIUM SUCCINATE 100 MG/2ML
100 INJECTION INTRAMUSCULAR; INTRAVENOUS
Status: CANCELLED | OUTPATIENT
Start: 2025-04-23

## 2025-04-23 RX ORDER — ACETAMINOPHEN 325 MG/1
650 TABLET ORAL
Status: CANCELLED | OUTPATIENT
Start: 2025-04-23

## 2025-04-23 RX ORDER — ONDANSETRON 2 MG/ML
8 INJECTION INTRAMUSCULAR; INTRAVENOUS
Status: CANCELLED | OUTPATIENT
Start: 2025-04-23

## 2025-04-23 RX ORDER — SODIUM CHLORIDE 0.9 % (FLUSH) 0.9 %
5-40 SYRINGE (ML) INJECTION PRN
Status: DISCONTINUED | OUTPATIENT
Start: 2025-04-23 | End: 2025-04-24 | Stop reason: HOSPADM

## 2025-04-23 RX ORDER — 0.9 % SODIUM CHLORIDE 0.9 %
1000 INTRAVENOUS SOLUTION INTRAVENOUS ONCE
Status: COMPLETED | OUTPATIENT
Start: 2025-04-23 | End: 2025-04-23

## 2025-04-23 RX ORDER — HEPARIN 100 UNIT/ML
500 SYRINGE INTRAVENOUS PRN
Status: CANCELLED | OUTPATIENT
Start: 2025-04-23

## 2025-04-23 RX ORDER — SODIUM CHLORIDE 0.9 % (FLUSH) 0.9 %
5-40 SYRINGE (ML) INJECTION PRN
Status: CANCELLED | OUTPATIENT
Start: 2025-04-23

## 2025-04-23 RX ORDER — SODIUM CHLORIDE 9 MG/ML
5-250 INJECTION, SOLUTION INTRAVENOUS PRN
Status: CANCELLED | OUTPATIENT
Start: 2025-04-23

## 2025-04-23 RX ORDER — 0.9 % SODIUM CHLORIDE 0.9 %
1000 INTRAVENOUS SOLUTION INTRAVENOUS ONCE
Status: CANCELLED | OUTPATIENT
Start: 2025-04-23 | End: 2025-04-23

## 2025-04-23 RX ORDER — FLUCONAZOLE 100 MG/1
100 TABLET ORAL DAILY
Qty: 7 TABLET | Refills: 0 | Status: SHIPPED | OUTPATIENT
Start: 2025-04-23 | End: 2025-04-30

## 2025-04-23 RX ADMIN — SODIUM CHLORIDE 1000 ML: 9 INJECTION, SOLUTION INTRAVENOUS at 12:09

## 2025-04-23 RX ADMIN — SODIUM CHLORIDE, PRESERVATIVE FREE 10 ML: 5 INJECTION INTRAVENOUS at 12:07

## 2025-04-23 RX ADMIN — SODIUM CHLORIDE, PRESERVATIVE FREE 10 ML: 5 INJECTION INTRAVENOUS at 13:31

## 2025-04-23 RX ADMIN — HEPARIN 500 UNITS: 100 SYRINGE at 13:31

## 2025-04-23 ASSESSMENT — PAIN SCALES - GENERAL
PAINLEVEL_OUTOF10: 10
PAINLEVEL_OUTOF10: 10

## 2025-04-23 ASSESSMENT — PAIN DESCRIPTION - LOCATION
LOCATION: THROAT
LOCATION: THROAT

## 2025-04-23 NOTE — TELEPHONE ENCOUNTER
[facility]- MEDICAL NUTRITION THERAPY     Visit Type: Follow-up     NUTRITION RECOMMENDATIONS / MONITORING / EVALUATION  Continue diet as tolerated/able.   Once feeding tube is placed and able to use, suggest Isosource 1.5, 250 mL (1 carton) 5 x/day, by bolus administration. Recommend 60 mL water flush before and after each bolus. This will provide a total of 1875 kcal, 85 g pro, 1555 mL water/day. Additional 300 mL water via mouth or G-tube recommended.   Will monitor PO tolerance, adequacy of intake, weight, and care plans.     Subjective/Current Data:  Matt Mao is a 64 y.o. 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   Writer received voicemail from radiation RN stating patient will be getting a feeding tube placed d/t difficulty swallowing and large weight loss since last week; just wanted to make RD aware. Weight record reviewed: noted 6% loss in less than 1 week.     Objective Data:  Patient Active Problem List    Diagnosis Date Noted    Malignant neoplasm of supraglottis (HCC) 03/13/2025    Laryngeal mass 02/21/2025     Anthropometric Measures:  Height: 5' 6\"  Weight: 129 lb 12.8 oz (58.9 kg)  Ideal Body Weight: 142 lb (64.5 kg)  BMI: 20.95 kg/m2 (Normal Weight)  Weight Change: 6% loss <1 week     Wt Readings from Last 20 Encounters:   04/23/25 58.9 kg (129 lb 12.8 oz)   04/23/25 58.9 kg (129 lb 12.8 oz)   04/18/25 62.3 kg (137 lb 6.4 oz)   04/16/25 63 kg (138 lb 12.8 oz)   04/11/25 62.5 kg (137 lb 12.8 oz)   04/07/25 62.8 kg (138 lb 6.4 oz)   03/07/25 61.3 kg (135 lb 3.2 oz)   03/07/25 61.3 kg (135 lb 3.2 oz)   02/28/25 61.7 kg (136 lb)   02/21/25 61.1 kg (134 lb 12.8 oz)     Estimated Nutrition Needs:  Estimated Calorie Needs: 1800 kcal/day   Estimated Protein Needs: 95 g pro/day      Current Nutrition Regimen:  Oral Diet: Diet as tolerated- noted minimal intake at present d/t dysphagia      Nutrition Diagnosis and

## 2025-04-23 NOTE — PROGRESS NOTES
[facility]       Radiation Oncology          [address] [place] [address]        O: [phone]        F: [phone]       [facility]             RADIATION ONCOLOGY WEEKLY PROGRESS NOTE  Patient ID:   Matt Mao  : 1960   MRN: [id number]    Location:  [facility] Radiation Oncology,   [address] [place] [address] Rd[address] [place] [address]   [phone]    DIAGNOSIS:  Squamous cell carcinoma of the supraglottic larynx T2 N1 M0     TREATMENT DETAILS:  Treatment Site: Larynx + LN  Actual Dose: 2000cGy  Total Planned Dose: 7000cGy  Treatment Technique: IMRT  Fraction Technique: Daily  Therapy imaging monitoring: CBCT daily  Concurrent Chemotherapy: Weekly cisplatin       SUBJECTIVE:   Patient seen for their weekly on treatment evaluation today.  Reports dysphagia, decreased appetite, weight loss, is taking Norco twice daily    OBJECTIVE:     ECO Asymptomatic    VITAL SIGNS: BP (!) 160/77   Pulse (!) 104   Temp 98.1 °F (36.7 °C) (Temporal)   Resp 16   Wt 58.9 kg (129 lb 12.8 oz)   SpO2 97%   BMI 20.95 kg/m²   Wt Readings from Last 5 Encounters:   25 58.9 kg (129 lb 12.8 oz)   25 58.9 kg (129 lb 12.8 oz)   25 62.3 kg (137 lb 6.4 oz)   25 63 kg (138 lb 12.8 oz)   25 62.5 kg (137 lb 12.8 oz)     GENERAL:  General appearance is that of a well-nourished, well-developed in no apparent distress.  HEENT: Mucositis in the oropharynx is noted, no radiation dermatitis  HEART:  Normal rate and regular rhythm  LUNGS:  Pulmonary effort normal.  ABDOMEN:  Soft, nontender, non distended  EXTREMITIES:  No edema.  No calf tenderness.  MSK:  No spinal tenderness. Normal ROM.  NEUROLOGICAL: Alert and oriented. Strength and sensation intact bilaterally. No focal deficits.   PSYCH: Mood normal, behavior normal.      LABS:  WBC   Date Value Ref Range Status   2025 12.6 (H) 3.5 - 11.0 k/uL Final   2025 9.8 3.5 - 11.0

## 2025-04-23 NOTE — TELEPHONE ENCOUNTER
Name: [place] Mao  : 1960  MRN: [id number]    Oncology Navigation Follow-Up Note    Contact Type:  Telephone    Notes: Dr. Finnegan alerted writer order placed for PEG placement per  IR & requested scheduled asap.  Aaliyah  IR , updated.  Aaliyah stated will update  IR MD for review & schedule once receives okay to proceed.  Will continue to follow.       Electronically signed by Ember Dillon RN on 2025 at 12:32 PM

## 2025-04-23 NOTE — PROGRESS NOTES
Matt Mao  4/23/2025  Wt Readings from Last 3 Encounters:   04/23/25 58.9 kg (129 lb 12.8 oz)   04/23/25 58.9 kg (129 lb 12.8 oz)   04/18/25 62.3 kg (137 lb 6.4 oz)     Body mass index is 20.95 kg/m².        Treatment Area:Larynx (with chemo)    Patient was seen today for weekly visit.     Comfort Alteration  Fatigue: Mild    Mucous Membrane Alteration  Mucositis Due to Radiation: No  Thrush: No  Voice Changes:Yes- raspy    Nutritional Alteration-No tube  Anorexia: Yes  Nausea: No  Vomiting: No     Ventilation Alteration  Cough:Occasional    Skin Alteration   Sensation:intact    Radiation Dermatitis:  Intact [x]     Erythema  []     Discoloration  []    Rash []     Dry desquamation  []     Moist desquamation []       Emotional  Coping: effective      Injury, potential bleeding or infection: skin care BID reinforced    Lab Results   Component Value Date    WBC 12.6 (H) 04/18/2025    HGB 11.6 (L) 04/18/2025    HCT 35.4 (L) 04/18/2025     04/18/2025         BP (!) 160/77   Pulse (!) 104   Temp 98.1 °F (36.7 °C) (Temporal)   Resp 16   Wt 58.9 kg (129 lb 12.8 oz)   SpO2 97%   BMI 20.95 kg/m²         Pain Level: 10         Assessment/Plan: Patient was seen today for weekly visit.  He states unable to swallow much due to pain.  Taking Norco 1 tab in the morning and 2 at bedtime and using Magic Mouthwash without relief.  He has lost significant weight since last week.  No signs of thrush.  States he ate canned ravioli last night and struggled with swallowing this.  Dr. Finnegan ordering weekly hydrations and making referral for feeding tube and ordering Diflucan.  Also telling pt okay to take Norco 1 tab every 6 hours for pain.  Will reassess pain at end of the week.  Left dietician a voicemail to update as well.  Referral also placed to Palliative Care.  Mary Jane Al RN

## 2025-04-23 NOTE — TELEPHONE ENCOUNTER
Name: [place] Mao  : 1960  MRN: [id number]    Oncology Navigation Follow-Up Note    Contact Type:  Telephone    Notes: MOIRA Fischer IR , called to inquire if pt takes plavix & asa.  Pt updated on Aaliyah's inquiry, pt stated unsure.  Instructed pt to bring all prescription medication bottles to  XRT appt.  Pt verbalized understanding.  Aaliyah updated.  Will continue to follow.      Electronically signed by Ember Dillon RN on 2025 at 1:41 PM

## 2025-04-23 NOTE — PROGRESS NOTES
Pt here for hydration.   Infusion complete without incident.  Pt d/c'd in stable condition.   Returns 4/25/25 for C1D15.

## 2025-04-23 NOTE — TELEPHONE ENCOUNTER
Patient called stating he is getting an infusion after treatment and needs his  changed to go home.  called insurance provider and requested ride be changed to will call.      1:35pm  Patient called stating ready for return ride.  called insurance provider requesting return ride.

## 2025-04-24 ENCOUNTER — HOSPITAL ENCOUNTER (OUTPATIENT)
Dept: RADIATION ONCOLOGY | Age: 65
Discharge: HOME | End: 2025-04-24
Payer: MEDICARE

## 2025-04-24 PROCEDURE — 77386 HC NTSTY MODUL RAD TX DLVR CPLX: CPT | Performed by: RADIOLOGY

## 2025-04-24 NOTE — PROGRESS NOTES
Patient's medications reconciled and confirmed what he is taking currently in his chart.  Patient states he feels better today after hydration and starting Diflucan.  He is declining feeding tube placement at this time.  States he does not want to deal with using this right now.  He was strongly advised to proceed with this procedure.  Patient states he will be agreeable \"if I get down to 124 lbs.\"  Patient made aware he will more than likely lose more weight than this.  He would like to wait another week to see how his weight holds.  Strongly advised to drink at least 3 Ensures per day.  He states this is costly.  Guided once he gets a feeding tube this can be ordered with insurance benefit.  Patient verbalized understanding and Dr. Finnegan updated.

## 2025-04-25 ENCOUNTER — OFFICE VISIT (OUTPATIENT)
Dept: ONCOLOGY | Age: 65
End: 2025-04-25

## 2025-04-25 ENCOUNTER — HOSPITAL ENCOUNTER (OUTPATIENT)
Dept: RADIATION ONCOLOGY | Age: 65
Discharge: HOME | End: 2025-04-25
Payer: MEDICARE

## 2025-04-25 ENCOUNTER — TELEPHONE (OUTPATIENT)
Dept: ONCOLOGY | Age: 65
End: 2025-04-25

## 2025-04-25 ENCOUNTER — HOSPITAL ENCOUNTER (OUTPATIENT)
Dept: INFUSION THERAPY | Age: 65
Discharge: HOME | End: 2025-04-25
Payer: MEDICARE

## 2025-04-25 VITALS
HEART RATE: 76 BPM | BODY MASS INDEX: 21.79 KG/M2 | SYSTOLIC BLOOD PRESSURE: 126 MMHG | DIASTOLIC BLOOD PRESSURE: 63 MMHG | RESPIRATION RATE: 18 BRPM | WEIGHT: 135 LBS

## 2025-04-25 DIAGNOSIS — C32.1 MALIGNANT NEOPLASM OF SUPRAGLOTTIS (HCC): Primary | ICD-10-CM

## 2025-04-25 DIAGNOSIS — Z51.11 CHEMOTHERAPY MANAGEMENT, ENCOUNTER FOR: ICD-10-CM

## 2025-04-25 LAB
ALBUMIN SERPL-MCNC: 3.9 G/DL (ref 3.5–5.2)
ALBUMIN/GLOB SERPL: 1.5 {RATIO} (ref 1–2.5)
ALP SERPL-CCNC: 102 U/L (ref 40–129)
ALT SERPL-CCNC: 13 U/L (ref 5–41)
ANION GAP SERPL CALCULATED.3IONS-SCNC: 8 MMOL/L (ref 9–17)
AST SERPL-CCNC: 24 U/L
BASOPHILS # BLD: 0.1 K/UL (ref 0–0.2)
BASOPHILS NFR BLD: 1 % (ref 0–2)
BILIRUB SERPL-MCNC: 0.1 MG/DL (ref 0.3–1.2)
BUN SERPL-MCNC: 21 MG/DL (ref 8–23)
CALCIUM SERPL-MCNC: 8.4 MG/DL (ref 8.6–10.4)
CHLORIDE SERPL-SCNC: 103 MMOL/L (ref 98–107)
CO2 SERPL-SCNC: 26 MMOL/L (ref 20–31)
CREAT SERPL-MCNC: 1.2 MG/DL (ref 0.7–1.2)
EOSINOPHIL # BLD: 0.3 K/UL (ref 0–0.4)
EOSINOPHILS RELATIVE PERCENT: 4 % (ref 1–4)
ERYTHROCYTE [DISTWIDTH] IN BLOOD BY AUTOMATED COUNT: 13.3 % (ref 12.5–15.4)
GFR, ESTIMATED: 67 ML/MIN/1.73M2
GLUCOSE SERPL-MCNC: 93 MG/DL (ref 70–99)
HCT VFR BLD AUTO: 29.6 % (ref 41–53)
HGB BLD-MCNC: 9.9 G/DL (ref 13.5–17.5)
LYMPHOCYTES NFR BLD: 0.7 K/UL (ref 1–4.8)
LYMPHOCYTES RELATIVE PERCENT: 9 % (ref 24–44)
MAGNESIUM SERPL-MCNC: 1.8 MG/DL (ref 1.6–2.6)
MCH RBC QN AUTO: 31.3 PG (ref 26–34)
MCHC RBC AUTO-ENTMCNC: 33.4 G/DL (ref 31–37)
MCV RBC AUTO: 93.6 FL (ref 80–100)
MONOCYTES NFR BLD: 0.7 K/UL (ref 0.1–1.2)
MONOCYTES NFR BLD: 9 % (ref 2–11)
NEUTROPHILS NFR BLD: 77 % (ref 36–66)
NEUTS SEG NFR BLD: 6.4 K/UL (ref 1.8–7.7)
PHOSPHATE SERPL-MCNC: 3.1 MG/DL (ref 2.5–4.5)
PLATELET # BLD AUTO: 300 K/UL (ref 140–450)
PMV BLD AUTO: 7.9 FL (ref 6–12)
POTASSIUM SERPL-SCNC: 4.7 MMOL/L (ref 3.7–5.3)
PROT SERPL-MCNC: 6.5 G/DL (ref 6.4–8.3)
RBC # BLD AUTO: 3.16 M/UL (ref 4.5–5.9)
SODIUM SERPL-SCNC: 137 MMOL/L (ref 135–144)
WBC OTHER # BLD: 8.4 K/UL (ref 3.5–11)

## 2025-04-25 PROCEDURE — 6360000002 HC RX W HCPCS: Performed by: INTERNAL MEDICINE

## 2025-04-25 PROCEDURE — 2500000003 HC RX 250 WO HCPCS: Performed by: INTERNAL MEDICINE

## 2025-04-25 PROCEDURE — 77386 HC NTSTY MODUL RAD TX DLVR CPLX: CPT | Performed by: RADIOLOGY

## 2025-04-25 PROCEDURE — 84100 ASSAY OF PHOSPHORUS: CPT

## 2025-04-25 PROCEDURE — 96366 THER/PROPH/DIAG IV INF ADDON: CPT

## 2025-04-25 PROCEDURE — 96375 TX/PRO/DX INJ NEW DRUG ADDON: CPT

## 2025-04-25 PROCEDURE — 80053 COMPREHEN METABOLIC PANEL: CPT

## 2025-04-25 PROCEDURE — 96368 THER/DIAG CONCURRENT INF: CPT

## 2025-04-25 PROCEDURE — 83735 ASSAY OF MAGNESIUM: CPT

## 2025-04-25 PROCEDURE — 96367 TX/PROPH/DG ADDL SEQ IV INF: CPT

## 2025-04-25 PROCEDURE — 96413 CHEMO IV INFUSION 1 HR: CPT

## 2025-04-25 PROCEDURE — 85025 COMPLETE CBC W/AUTO DIFF WBC: CPT

## 2025-04-25 PROCEDURE — 2580000003 HC RX 258: Performed by: INTERNAL MEDICINE

## 2025-04-25 RX ORDER — HEPARIN 100 UNIT/ML
500 SYRINGE INTRAVENOUS PRN
Status: DISCONTINUED | OUTPATIENT
Start: 2025-04-25 | End: 2025-04-26 | Stop reason: HOSPADM

## 2025-04-25 RX ORDER — PALONOSETRON 0.05 MG/ML
0.25 INJECTION, SOLUTION INTRAVENOUS ONCE
Status: COMPLETED | OUTPATIENT
Start: 2025-04-25 | End: 2025-04-25

## 2025-04-25 RX ORDER — DEXAMETHASONE SODIUM PHOSPHATE 10 MG/ML
10 INJECTION, SOLUTION INTRAMUSCULAR; INTRAVENOUS ONCE
Status: COMPLETED | OUTPATIENT
Start: 2025-04-25 | End: 2025-04-25

## 2025-04-25 RX ORDER — SODIUM CHLORIDE 9 MG/ML
5-250 INJECTION, SOLUTION INTRAVENOUS PRN
Status: DISCONTINUED | OUTPATIENT
Start: 2025-04-25 | End: 2025-04-26 | Stop reason: HOSPADM

## 2025-04-25 RX ORDER — DEXTROSE MONOHYDRATE, SODIUM CHLORIDE, AND POTASSIUM CHLORIDE 50; 1.49; 9 G/1000ML; G/1000ML; G/1000ML
INJECTION, SOLUTION INTRAVENOUS ONCE
Status: DISCONTINUED | OUTPATIENT
Start: 2025-04-25 | End: 2025-04-25

## 2025-04-25 RX ORDER — MAGNESIUM SULFATE 1 G/100ML
1000 INJECTION INTRAVENOUS ONCE
Status: COMPLETED | OUTPATIENT
Start: 2025-04-25 | End: 2025-04-25

## 2025-04-25 RX ORDER — HYDROCODONE BITARTRATE AND ACETAMINOPHEN 7.5; 325 MG/1; MG/1
1 TABLET ORAL EVERY 6 HOURS PRN
Qty: 60 TABLET | Refills: 0 | Status: SHIPPED | OUTPATIENT
Start: 2025-04-25 | End: 2025-05-25

## 2025-04-25 RX ORDER — SODIUM CHLORIDE 0.9 % (FLUSH) 0.9 %
5-40 SYRINGE (ML) INJECTION PRN
Status: DISCONTINUED | OUTPATIENT
Start: 2025-04-25 | End: 2025-04-26 | Stop reason: HOSPADM

## 2025-04-25 RX ORDER — SODIUM CHLORIDE AND POTASSIUM CHLORIDE 150; 900 MG/100ML; MG/100ML
INJECTION, SOLUTION INTRAVENOUS CONTINUOUS
Status: DISCONTINUED | OUTPATIENT
Start: 2025-04-25 | End: 2025-04-25 | Stop reason: HOSPADM

## 2025-04-25 RX ADMIN — MAGNESIUM SULFATE HEPTAHYDRATE 1000 MG: 1 INJECTION, SOLUTION INTRAVENOUS at 12:26

## 2025-04-25 RX ADMIN — POTASSIUM CHLORIDE AND SODIUM CHLORIDE: 900; 150 INJECTION, SOLUTION INTRAVENOUS at 15:10

## 2025-04-25 RX ADMIN — MAGNESIUM SULFATE HEPTAHYDRATE 1000 MG: 1 INJECTION, SOLUTION INTRAVENOUS at 15:12

## 2025-04-25 RX ADMIN — CISPLATIN 68 MG: 1 INJECTION, SOLUTION INTRAVENOUS at 14:05

## 2025-04-25 RX ADMIN — POTASSIUM CHLORIDE AND SODIUM CHLORIDE: 900; 150 INJECTION, SOLUTION INTRAVENOUS at 12:20

## 2025-04-25 RX ADMIN — SODIUM CHLORIDE, PRESERVATIVE FREE 10 ML: 5 INJECTION INTRAVENOUS at 11:48

## 2025-04-25 RX ADMIN — SODIUM CHLORIDE 150 MG: 9 INJECTION, SOLUTION INTRAVENOUS at 13:34

## 2025-04-25 RX ADMIN — SODIUM CHLORIDE 25 ML/HR: 0.9 INJECTION, SOLUTION INTRAVENOUS at 12:08

## 2025-04-25 RX ADMIN — PALONOSETRON 0.25 MG: 0.05 INJECTION, SOLUTION INTRAVENOUS at 13:31

## 2025-04-25 RX ADMIN — SODIUM CHLORIDE, PRESERVATIVE FREE 10 ML: 5 INJECTION INTRAVENOUS at 15:59

## 2025-04-25 RX ADMIN — DEXAMETHASONE SODIUM PHOSPHATE 10 MG: 10 INJECTION INTRAMUSCULAR; INTRAVENOUS at 13:32

## 2025-04-25 RX ADMIN — HEPARIN 500 UNITS: 100 SYRINGE at 15:59

## 2025-04-25 NOTE — PROGRESS NOTES
Patient notified writer he would like feeding tube now.  Message sent to MICHELLE Fischer , and await return call/message for appt for this.  Patient states taking 2 Norco 5/325 in the morning an afternoon are not helping his pain with swallowing.  Discussed with Dr. Finnegan who is agreeable to prescribe Norco 7.5/325 and pt instructed on use.  Patient instructed to optimize nutrition until feeding tube placed and to go to [place] if unable to eat/swallow enough.  Patient states son can take him to this procedure.  Support offered and pt verbalized understanding.

## 2025-04-25 NOTE — PROGRESS NOTES
Pt here for C.1D.15.  Arrives ambulatory.  Denies any new complaints.  Labs drawn from port, results reviewed.    Tx complete without incident.  Pt d/c'd in stable condition.  Returns 4/28/25 for hydration.

## 2025-04-25 NOTE — PROGRESS NOTES
tape, Desonide, and Penicillins    Review of Systems:    Constitutional: No fever or chills. No night sweats, no weight loss   Eyes: No eye discharge, double vision, or eye pain   HEENT: negative for sore mouth, sore throat positive for hoarseness of voice.  Positive right ear pain.  Respiratory: negative for cough , sputum, dyspnea, wheezing, hemoptysis, chest pain   Cardiovascular: negative for chest pain, dyspnea, palpitations, orthopnea, PND   Gastrointestinal: negative for nausea, vomiting, diarrhea, constipation, abdominal pain, Dysphagia, hematemesis and hematochezia   Genitourinary: negative for frequency, dysuria, nocturia, urinary incontinence, and hematuria   Integument: negative for rash, skin lesions, bruises.   Hematologic/Lymphatic: negative for easy bruising, bleeding, lymphadenopathy, or petechiae   Endocrine: negative for heat or cold intolerance,weight changes, change in bowel habits and hair loss   Musculoskeletal: negative for myalgias, arthralgias, pain, joint swelling,and bone pain   Neurological: negative for headaches, dizziness, seizures, weakness, numbness    Physical Exam:  Vitals: There were no vitals taken for this visit.  General appearance - well appearing, no in pain or distress  Mental status - AAO X3  Eyes - pupils equal and reactive, extraocular eye movements intact  Mouth - mucous membranes moist, pharynx normal without lesions  Neck - supple, no significant adenopathy  Lymphatics - no palpable lymphadenopathy, no hepatosplenomegaly  Chest - clear to auscultation, no wheezes, rales or rhonchi, symmetric air entry  Heart - normal rate, regular rhythm, normal S1, S2, no murmurs  Abdomen - soft, nontender, nondistended, no masses or organomegaly  Neurological - alert, oriented, normal speech, no focal findings or movement disorder noted  Extremities - peripheral pulses normal, no pedal edema, no clubbing or cyanosis  Skin - normal coloration and turgor, no rashes, no suspicious skin

## 2025-04-25 NOTE — TELEPHONE ENCOUNTER
Instructions   from Dr. Chin Mota MD    Tx today   Rv in 1 week with tx     RV 05/02/25 @ 1030 WITH TX TO FOLLOW

## 2025-04-28 ENCOUNTER — TELEPHONE (OUTPATIENT)
Dept: RADIATION ONCOLOGY | Age: 65
End: 2025-04-28

## 2025-04-28 ENCOUNTER — HOSPITAL ENCOUNTER (OUTPATIENT)
Dept: RADIATION ONCOLOGY | Age: 65
Discharge: HOME | End: 2025-04-28
Payer: MEDICARE

## 2025-04-28 ENCOUNTER — HOSPITAL ENCOUNTER (OUTPATIENT)
Dept: INFUSION THERAPY | Age: 65
Discharge: HOME | End: 2025-04-28
Payer: MEDICARE

## 2025-04-28 VITALS
RESPIRATION RATE: 16 BRPM | DIASTOLIC BLOOD PRESSURE: 67 MMHG | SYSTOLIC BLOOD PRESSURE: 138 MMHG | HEART RATE: 69 BPM | TEMPERATURE: 97.5 F

## 2025-04-28 DIAGNOSIS — C32.1 MALIGNANT NEOPLASM OF SUPRAGLOTTIS (HCC): Primary | ICD-10-CM

## 2025-04-28 PROCEDURE — 2500000003 HC RX 250 WO HCPCS: Performed by: RADIOLOGY

## 2025-04-28 PROCEDURE — 6360000002 HC RX W HCPCS: Performed by: RADIOLOGY

## 2025-04-28 PROCEDURE — 2580000003 HC RX 258: Performed by: RADIOLOGY

## 2025-04-28 PROCEDURE — 96360 HYDRATION IV INFUSION INIT: CPT

## 2025-04-28 PROCEDURE — 77386 HC NTSTY MODUL RAD TX DLVR CPLX: CPT | Performed by: RADIOLOGY

## 2025-04-28 RX ORDER — 0.9 % SODIUM CHLORIDE 0.9 %
1000 INTRAVENOUS SOLUTION INTRAVENOUS ONCE
Status: COMPLETED | OUTPATIENT
Start: 2025-04-28 | End: 2025-04-28

## 2025-04-28 RX ORDER — SODIUM CHLORIDE 9 MG/ML
INJECTION, SOLUTION INTRAVENOUS CONTINUOUS
OUTPATIENT
Start: 2025-04-28

## 2025-04-28 RX ORDER — EPINEPHRINE 1 MG/ML
0.3 INJECTION, SOLUTION, CONCENTRATE INTRAVENOUS PRN
OUTPATIENT
Start: 2025-04-28

## 2025-04-28 RX ORDER — ALBUTEROL SULFATE 90 UG/1
4 INHALANT RESPIRATORY (INHALATION) PRN
OUTPATIENT
Start: 2025-04-28

## 2025-04-28 RX ORDER — SODIUM CHLORIDE 0.9 % (FLUSH) 0.9 %
5-40 SYRINGE (ML) INJECTION PRN
OUTPATIENT
Start: 2025-04-28

## 2025-04-28 RX ORDER — HEPARIN 100 UNIT/ML
500 SYRINGE INTRAVENOUS PRN
Status: DISCONTINUED | OUTPATIENT
Start: 2025-04-28 | End: 2025-04-29 | Stop reason: HOSPADM

## 2025-04-28 RX ORDER — HYDROCORTISONE SODIUM SUCCINATE 100 MG/2ML
100 INJECTION INTRAMUSCULAR; INTRAVENOUS
OUTPATIENT
Start: 2025-04-28

## 2025-04-28 RX ORDER — FAMOTIDINE 10 MG/ML
20 INJECTION, SOLUTION INTRAVENOUS
OUTPATIENT
Start: 2025-04-28

## 2025-04-28 RX ORDER — SODIUM CHLORIDE 0.9 % (FLUSH) 0.9 %
5-40 SYRINGE (ML) INJECTION PRN
Status: DISCONTINUED | OUTPATIENT
Start: 2025-04-28 | End: 2025-04-29 | Stop reason: HOSPADM

## 2025-04-28 RX ORDER — DIPHENHYDRAMINE HYDROCHLORIDE 50 MG/ML
50 INJECTION, SOLUTION INTRAMUSCULAR; INTRAVENOUS
OUTPATIENT
Start: 2025-04-28

## 2025-04-28 RX ORDER — ACETAMINOPHEN 325 MG/1
650 TABLET ORAL
OUTPATIENT
Start: 2025-04-28

## 2025-04-28 RX ORDER — SODIUM CHLORIDE 9 MG/ML
5-250 INJECTION, SOLUTION INTRAVENOUS PRN
OUTPATIENT
Start: 2025-04-28

## 2025-04-28 RX ORDER — HEPARIN 100 UNIT/ML
500 SYRINGE INTRAVENOUS PRN
OUTPATIENT
Start: 2025-04-28

## 2025-04-28 RX ORDER — 0.9 % SODIUM CHLORIDE 0.9 %
1000 INTRAVENOUS SOLUTION INTRAVENOUS ONCE
Status: CANCELLED | OUTPATIENT
Start: 2025-04-28 | End: 2025-04-28

## 2025-04-28 RX ORDER — ONDANSETRON 2 MG/ML
8 INJECTION INTRAMUSCULAR; INTRAVENOUS
OUTPATIENT
Start: 2025-04-28

## 2025-04-28 RX ADMIN — HEPARIN 500 UNITS: 100 SYRINGE at 12:09

## 2025-04-28 RX ADMIN — SODIUM CHLORIDE 1000 ML: 0.9 INJECTION, SOLUTION INTRAVENOUS at 11:37

## 2025-04-28 RX ADMIN — SODIUM CHLORIDE, PRESERVATIVE FREE 10 ML: 5 INJECTION INTRAVENOUS at 11:34

## 2025-04-28 RX ADMIN — SODIUM CHLORIDE, PRESERVATIVE FREE 10 ML: 5 INJECTION INTRAVENOUS at 12:09

## 2025-04-28 NOTE — TELEPHONE ENCOUNTER
----- Message from James ROSARIO sent at 4/28/2025 11:43 AM EDT -----  Regarding: RE: Feeding Tube  Saw he was scheduled, will go pop upstairs and check in. Thanks!  ----- Message -----  From: Mary Jane Al RN  Sent: 4/28/2025  11:33 AM EDT  To: VALERI Ndiaye  Subject: FW: Feeding Tube                                 FYI-may need to cancel ride on 5/5/25 for radiation.  Patient is reluctantly agreeable.  I told him lets leave the feeding tube scheduled for now and easier to cancel than keep rescheduling.  He said he has someone that can take him to the procedure that day.    Mary Jane Solitario  ----- Message -----  From: Mary Jane Al RN  Sent: 4/28/2025  11:30 AM EDT  To: Aaliyah Hay MA; Shelley Finnegan MD; #  Subject: RE: Feeding Tube                                 Okay I typed up the following info for pt and spoke with him today:    Appt for feeding tube:  Monday , May 5th-   Arrive at 8:30 to [facility] Entr[facility]     You must have someone with you for this procedure.     Nothing to eat or drink after midnight the night before.       Hold Aspirin 5 days before procedure      Hold Eliquis 2 days before procedure.     If you do not follow these instructions, procedure will have to be rescheduled.      Thanks for your help Sofie Hinton  ----- Message -----  From: Aaliyah Hay MA  Sent: 4/28/2025  10:35 AM EDT  To: Mary Jane Al RN  Subject: RE: Feeding Tube                                 ADALGISA Hinton,   I don't work Fridays so I just got this message.   We could do 5/5 10am appt,   830am arrival to [facility] entrance,  npo after midnight, , hold thinners    Let me know if this works.  ----- Message -----  From: Mary Jane Al RN  Sent: 4/25/2025  11:10 AM EDT  To: Aaliyah Hay MA; Shelley Finnegan MD; #  Subject: Feeding Tube                                     Patient came in today for radiation treatment and now states he needs a feeding tube.  I told him I

## 2025-04-28 NOTE — PROGRESS NOTES
Pt here for hydration.  Arrives ambulatory.  Denies any new complaints.  Pt only received 500 ml due to transportation.  Tx complete without incident.  Pt d/c'd in stable condition.  Returns 5/2/25 for MD visit and C1D22 cisplatin.

## 2025-04-29 ENCOUNTER — HOSPITAL ENCOUNTER (OUTPATIENT)
Dept: RADIATION ONCOLOGY | Age: 65
Discharge: HOME | End: 2025-04-29
Payer: MEDICARE

## 2025-04-29 ENCOUNTER — TELEPHONE (OUTPATIENT)
Dept: ONCOLOGY | Age: 65
End: 2025-04-29

## 2025-04-29 ENCOUNTER — HOSPITAL ENCOUNTER (OUTPATIENT)
Dept: PHYSICAL THERAPY | Facility: CLINIC | Age: 65
Setting detail: THERAPIES SERIES
Discharge: HOME | End: 2025-04-29
Payer: MEDICARE

## 2025-04-29 PROCEDURE — 97140 MANUAL THERAPY 1/> REGIONS: CPT

## 2025-04-29 PROCEDURE — 97110 THERAPEUTIC EXERCISES: CPT

## 2025-04-29 PROCEDURE — 77386 HC NTSTY MODUL RAD TX DLVR CPLX: CPT | Performed by: RADIOLOGY

## 2025-04-29 NOTE — FLOWSHEET NOTE
[] [facility]  Outpatient Rehabilitation &  Therapy  [address]  [phone]  [phone] [] [facility]  Outpatient Rehabilitation &  Therapy  [address]  P: [phone]  F: [phone] [x] [facility]  Outpatient Rehabilitation &  Therapy  [address]  P: [phone]  F: [phone] [] [facility]  Outpatient Rehabilitation &  Therapy  [address]  [phone]  [phone] [] [facility]  Outpatient Rehabilitation &  Therapy  [address]   P: [phone]  F: [phone]  [] [facility]  Outpatient Rehabilitation &  Therapy  [address]  P: [phone]  F: [phone] [] [facility]  Outpatient Rehabilitation &  Therapy  [address]  P: [phone]  F: [phone] [] [facility]  Outpatient Rehabilitation &  Therapy  [address]  P: [phone]  F: [phone] [] [facility]  Outpatient Rehabilitation &  Therapy  [address]  P: [phone]  F: [phone]  [] [facility] Outpatient Rehabilitation &  Therapy  [address]  P: [phone]  F: [phone]     Physical Therapy Daily Treatment Note    Date:  2025  Patient Name:  Matt Mao    :  1960  MRN: [id number]  Physician: Shelley Finnegan          Insurance: [facility] MEDICARE DUAL, [facility] Medicaid  visits BMN  Medical Diagnosis: C32.1 Malignant neoplasm of supraglottis         Rehab Codes:  25.611, 25.612, R53.0, L90.5, R29.3   Onset date: 3/14/25 script                Next Dr's appt.: ongoing     Visit# / total visits: ; Progress note for Medicare patient due at visit 10     Cancels/No Shows: 0    Subjective:  Pt arrived stating pain meds have helped sig reduce throat pain since last session. Reports eating and

## 2025-04-29 NOTE — TELEPHONE ENCOUNTER
attempted to set up transportation for next week's treatment but [facility] states patient out of rides.  called [facility] and scheduled rides (5/6-5/9). Patient will go to PT then treatment on 5/7.    Transportation details:  [facility] [phone]   10:30am 9:00am 10:30am 10:00am  Confirmation [id number]  Return ride 12:15pm, call for ride on 5/9

## 2025-04-30 ENCOUNTER — CLINICAL DOCUMENTATION (OUTPATIENT)
Age: 65
End: 2025-04-30

## 2025-04-30 ENCOUNTER — HOSPITAL ENCOUNTER (OUTPATIENT)
Dept: RADIATION ONCOLOGY | Age: 65
Discharge: HOME | End: 2025-04-30
Payer: MEDICARE

## 2025-04-30 VITALS
OXYGEN SATURATION: 97 % | BODY MASS INDEX: 21.73 KG/M2 | SYSTOLIC BLOOD PRESSURE: 152 MMHG | WEIGHT: 134.6 LBS | RESPIRATION RATE: 16 BRPM | DIASTOLIC BLOOD PRESSURE: 71 MMHG | TEMPERATURE: 98.3 F | HEART RATE: 74 BPM

## 2025-04-30 PROCEDURE — 77386 HC NTSTY MODUL RAD TX DLVR CPLX: CPT | Performed by: RADIOLOGY

## 2025-04-30 PROCEDURE — 77336 RADIATION PHYSICS CONSULT: CPT | Performed by: RADIOLOGY

## 2025-04-30 ASSESSMENT — PAIN DESCRIPTION - LOCATION: LOCATION: THROAT

## 2025-04-30 ASSESSMENT — PAIN SCALES - GENERAL: PAINLEVEL_OUTOF10: 2

## 2025-04-30 NOTE — PROGRESS NOTES
Matt TAYLOR Mayco  4/30/2025  Wt Readings from Last 3 Encounters:   04/30/25 61.1 kg (134 lb 9.6 oz)   04/25/25 61.2 kg (135 lb)   04/23/25 58.9 kg (129 lb 12.8 oz)     Body mass index is 21.73 kg/m².        Treatment Area:Larynx (with chemo)    Patient was seen today for weekly visit.     Comfort Alteration  Fatigue: Mild    Mucous Membrane Alteration  Mucositis Due to Radiation: No  Thrush: No  Voice Changes:Yes- raspy    Nutritional Alteration-No tube  Anorexia: Yes  Nausea: No  Vomiting: No     Ventilation Alteration  Cough:Occasional    Skin Alteration   Sensation:intact    Radiation Dermatitis:  Intact [x]     Erythema  []     Discoloration  []    Rash []     Dry desquamation  []     Moist desquamation []       Emotional  Coping: effective      Injury, potential bleeding or infection: skin care BID reinforced    Lab Results   Component Value Date    WBC 8.4 04/25/2025    HGB 9.9 (L) 04/25/2025    HCT 29.6 (L) 04/25/2025     04/25/2025         BP (!) 152/71   Pulse 74   Temp 98.3 °F (36.8 °C) (Temporal)   Resp 16   Wt 61.1 kg (134 lb 9.6 oz)   SpO2 97%   BMI 21.73 kg/m²      Pain Assessment: 0-10  Pain Level: 2         Assessment/Plan: Patient was seen today for weekly visit. He arrives ambulatory with steady gait.  No oral thrush noted.  Patient reports minimal pain with swallowing when he uses Norco. Patient encouraged to take stool softener as he increases amount of pain medication.  He voices understanding.  Patient states he had chicken wings for dinner last night and ate with no issues.  Patient voices concern regarding feeding tube placement next week.  Writer provided active listening and emotional support.  Patient receiving weekly hydration. Dr. Finnegan evaluated patient.  Continue plan of care.      Elsie Pereira RN  
0.1 % cream, Apply topically 2 times daily., Disp: 45 g, Rfl: 2    Garlic 100 MG TABS, Take 100 mg by mouth 2 times daily Last dose Monday 2/24/25, Disp: , Rfl:     famotidine (PEPCID) 40 MG tablet, Take 1 tablet by mouth daily, Disp: , Rfl:     Magic Mouthwash (MIRACLE MOUTHWASH), Shake Well; For Oral Use. Lidocaine Viscous 2%; 80mL, Diphenhydramine 12.5MG/5Ml; 80mL, ALUM & MAG HYDROXIDE-SIMETH 200-200-20 MG/5ML; 80mL., Disp: 473 mL, Rfl: 1    ibuprofen (IBU) 400 MG tablet, Take 1 tablet by mouth every 6 hours as needed for Pain (1-2 tabs (400-800mg) to alternate with Tylenol so that you are taking something for pain every 3 hours) (Patient not taking: Reported on 4/24/2025), Disp: 120 tablet, Rfl: 1    acetaminophen (TYLENOL) 325 MG tablet, Take 2 tablets by mouth every 6 hours as needed for Pain Alternate with Motrin so that you are taking something for pain every 3 hours (Patient not taking: Reported on 4/24/2025), Disp: 120 tablet, Rfl: 1    cyclobenzaprine (FLEXERIL) 10 MG tablet, Take 1 tablet by mouth 3 times daily as needed for Muscle spasms (Patient not taking: Reported on 4/24/2025), Disp: , Rfl:     sildenafil (VIAGRA) 100 MG tablet, Take 1 tablet by mouth as needed for Erectile Dysfunction (Patient not taking: Reported on 4/24/2025), Disp: , Rfl:     amLODIPine (NORVASC) 5 MG tablet, Take 2 tablets by mouth daily, Disp: , Rfl:     lisinopril (PRINIVIL;ZESTRIL) 5 MG tablet, Take 1 tablet by mouth daily, Disp: , Rfl:     Magic Mouthwash (MIRACLE MOUTHWASH), Swish and swallow 5 mLs 3 times daily as needed for Pain Please refer to pharmacist recommendations for concentrations of above additives.  I defer this decision to the practicing pharmacist, Disp: 473 mL, Rfl: 1    aspirin (DEIRDRE ASPIRIN) 325 MG tablet, Patient states takes a full 325 mg ASA, Disp: , Rfl:     Emollient (CERAVE) CREA, , Disp: , Rfl:     EQ STOMACH RELIEF 262 MG/15ML suspension, , Disp: , Rfl: 0    clopidogrel (PLAVIX) 75 MG tablet, ,

## 2025-04-30 NOTE — PROGRESS NOTES
[facility]- MEDICAL NUTRITION THERAPY     Visit Type: Follow-up     NUTRITION RECOMMENDATIONS / MONITORING / EVALUATION  Continue diet as tolerated. Recommended continuation of oral nutrition supplements, increasing qty as needed/able.   Will monitor PO tolerance, adequacy of intake, and weight; monitor need for nutrition support via PEG tube.     Subjective/Current Data:  Matt Mao is a 64 y.o. 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   Chart reviewed and met with patient at RO appt. Pt states he is currently chewing/swallowing ok at present. Feels like there is a lump in his throat, but doesn't affect intake. States appetite is good. Drinks Boost once daily, in addition to meals; plans to increase qty to TID. Noted pt is scheduled to have PEG placed on 5/5/25. Pt states he is nervous about placement and taking care of tube.     Objective Data:  Patient Active Problem List    Diagnosis Date Noted    Malignant neoplasm of supraglottis (HCC) 03/13/2025    Laryngeal mass 02/21/2025     Anthropometric Measures:  Height: 5' 6\"  Weight: 134 lb 9.6 oz (61.1 kg)  Ideal Body Weight: 142 lb (64.5 kg)  BMI: 21.73 kg/m2 (Normal Weight)    Wt Readings from Last 20 Encounters:   04/30/25 61.1 kg (134 lb 9.6 oz)   04/25/25 61.2 kg (135 lb)   04/23/25 58.9 kg (129 lb 12.8 oz)--pt states this was an error   04/18/25 62.3 kg (137 lb 6.4 oz)   04/16/25 63 kg (138 lb 12.8 oz)   04/11/25 62.5 kg (137 lb 12.8 oz)   04/07/25 62.8 kg (138 lb 6.4 oz)   03/07/25 61.3 kg (135 lb 3.2 oz)   03/07/25 61.3 kg (135 lb 3.2 oz)   02/28/25 61.7 kg (136 lb)   02/21/25 61.1 kg (134 lb 12.8 oz)     Estimated Nutrition Needs:  Estimated Calorie Needs: 1800 kcal/day   Estimated Protein Needs: 70-90 g pro/day   Estimated Fluid Needs: 1800 mL/day     Current Nutrition Regimen:  Oral Diet: Diet as tolerated  Oral Supplement: Boost 1x/day (?which type)    Nutrition

## 2025-05-01 ENCOUNTER — HOSPITAL ENCOUNTER (OUTPATIENT)
Dept: RADIATION ONCOLOGY | Age: 65
Discharge: HOME | End: 2025-05-01
Payer: MEDICARE

## 2025-05-01 PROCEDURE — 77386 HC NTSTY MODUL RAD TX DLVR CPLX: CPT | Performed by: RADIOLOGY

## 2025-05-02 ENCOUNTER — HOSPITAL ENCOUNTER (OUTPATIENT)
Dept: INFUSION THERAPY | Age: 65
Discharge: HOME | End: 2025-05-02
Payer: MEDICARE

## 2025-05-02 ENCOUNTER — OFFICE VISIT (OUTPATIENT)
Age: 65
End: 2025-05-02

## 2025-05-02 ENCOUNTER — HOSPITAL ENCOUNTER (OUTPATIENT)
Dept: RADIATION ONCOLOGY | Age: 65
Discharge: HOME | End: 2025-05-02
Payer: MEDICARE

## 2025-05-02 ENCOUNTER — TELEPHONE (OUTPATIENT)
Age: 65
End: 2025-05-02

## 2025-05-02 VITALS
TEMPERATURE: 98.5 F | DIASTOLIC BLOOD PRESSURE: 70 MMHG | OXYGEN SATURATION: 96 % | BODY MASS INDEX: 21.14 KG/M2 | HEART RATE: 77 BPM | WEIGHT: 131 LBS | SYSTOLIC BLOOD PRESSURE: 125 MMHG | RESPIRATION RATE: 18 BRPM

## 2025-05-02 DIAGNOSIS — Z51.11 CHEMOTHERAPY MANAGEMENT, ENCOUNTER FOR: ICD-10-CM

## 2025-05-02 DIAGNOSIS — C32.1 MALIGNANT NEOPLASM OF SUPRAGLOTTIS (HCC): Primary | ICD-10-CM

## 2025-05-02 LAB
ALBUMIN SERPL-MCNC: 3.7 G/DL (ref 3.5–5.2)
ALBUMIN/GLOB SERPL: 1.2 {RATIO} (ref 1–2.5)
ALP SERPL-CCNC: 85 U/L (ref 40–129)
ALT SERPL-CCNC: <5 U/L (ref 5–41)
ANION GAP SERPL CALCULATED.3IONS-SCNC: 10 MMOL/L (ref 9–17)
AST SERPL-CCNC: 23 U/L
BASOPHILS # BLD: 0 K/UL (ref 0–0.2)
BASOPHILS NFR BLD: 1 % (ref 0–2)
BILIRUB SERPL-MCNC: 0.2 MG/DL (ref 0.3–1.2)
BUN SERPL-MCNC: 17 MG/DL (ref 8–23)
CALCIUM SERPL-MCNC: 8.2 MG/DL (ref 8.6–10.4)
CHLORIDE SERPL-SCNC: 102 MMOL/L (ref 98–107)
CO2 SERPL-SCNC: 27 MMOL/L (ref 20–31)
CREAT SERPL-MCNC: 1.2 MG/DL (ref 0.7–1.2)
EOSINOPHIL # BLD: 0.2 K/UL (ref 0–0.4)
EOSINOPHILS RELATIVE PERCENT: 3 % (ref 1–4)
ERYTHROCYTE [DISTWIDTH] IN BLOOD BY AUTOMATED COUNT: 13.5 % (ref 12.5–15.4)
GFR, ESTIMATED: 67 ML/MIN/1.73M2
GLUCOSE SERPL-MCNC: 108 MG/DL (ref 70–99)
HCT VFR BLD AUTO: 29.5 % (ref 41–53)
HGB BLD-MCNC: 9.9 G/DL (ref 13.5–17.5)
LYMPHOCYTES NFR BLD: 0.6 K/UL (ref 1–4.8)
LYMPHOCYTES RELATIVE PERCENT: 9 % (ref 24–44)
MAGNESIUM SERPL-MCNC: 1.5 MG/DL (ref 1.6–2.6)
MCH RBC QN AUTO: 31.2 PG (ref 26–34)
MCHC RBC AUTO-ENTMCNC: 33.5 G/DL (ref 31–37)
MCV RBC AUTO: 93.1 FL (ref 80–100)
MONOCYTES NFR BLD: 0.5 K/UL (ref 0.1–1.2)
MONOCYTES NFR BLD: 9 % (ref 2–11)
NEUTROPHILS NFR BLD: 78 % (ref 36–66)
NEUTS SEG NFR BLD: 4.8 K/UL (ref 1.8–7.7)
PHOSPHATE SERPL-MCNC: 3.4 MG/DL (ref 2.5–4.5)
PLATELET # BLD AUTO: 261 K/UL (ref 140–450)
PMV BLD AUTO: 8.1 FL (ref 6–12)
POTASSIUM SERPL-SCNC: 4.5 MMOL/L (ref 3.7–5.3)
PROT SERPL-MCNC: 6.7 G/DL (ref 6.4–8.3)
RBC # BLD AUTO: 3.17 M/UL (ref 4.5–5.9)
SODIUM SERPL-SCNC: 139 MMOL/L (ref 135–144)
WBC OTHER # BLD: 6.1 K/UL (ref 3.5–11)

## 2025-05-02 PROCEDURE — 84100 ASSAY OF PHOSPHORUS: CPT

## 2025-05-02 PROCEDURE — 77386 HC NTSTY MODUL RAD TX DLVR CPLX: CPT | Performed by: RADIOLOGY

## 2025-05-02 PROCEDURE — 6360000002 HC RX W HCPCS: Performed by: INTERNAL MEDICINE

## 2025-05-02 PROCEDURE — 96375 TX/PRO/DX INJ NEW DRUG ADDON: CPT

## 2025-05-02 PROCEDURE — 96413 CHEMO IV INFUSION 1 HR: CPT

## 2025-05-02 PROCEDURE — 2580000003 HC RX 258: Performed by: INTERNAL MEDICINE

## 2025-05-02 PROCEDURE — 96367 TX/PROPH/DG ADDL SEQ IV INF: CPT

## 2025-05-02 PROCEDURE — 96366 THER/PROPH/DIAG IV INF ADDON: CPT

## 2025-05-02 PROCEDURE — 2500000003 HC RX 250 WO HCPCS: Performed by: INTERNAL MEDICINE

## 2025-05-02 PROCEDURE — 80053 COMPREHEN METABOLIC PANEL: CPT

## 2025-05-02 PROCEDURE — 83735 ASSAY OF MAGNESIUM: CPT

## 2025-05-02 PROCEDURE — 85025 COMPLETE CBC W/AUTO DIFF WBC: CPT

## 2025-05-02 PROCEDURE — 96368 THER/DIAG CONCURRENT INF: CPT

## 2025-05-02 RX ORDER — SODIUM CHLORIDE 0.9 % (FLUSH) 0.9 %
5-40 SYRINGE (ML) INJECTION PRN
Status: DISCONTINUED | OUTPATIENT
Start: 2025-05-02 | End: 2025-05-03 | Stop reason: HOSPADM

## 2025-05-02 RX ORDER — HEPARIN 100 UNIT/ML
500 SYRINGE INTRAVENOUS PRN
Status: DISCONTINUED | OUTPATIENT
Start: 2025-05-02 | End: 2025-05-03 | Stop reason: HOSPADM

## 2025-05-02 RX ORDER — MAGNESIUM SULFATE 1 G/100ML
1000 INJECTION INTRAVENOUS ONCE
Status: COMPLETED | OUTPATIENT
Start: 2025-05-02 | End: 2025-05-02

## 2025-05-02 RX ORDER — DEXAMETHASONE SODIUM PHOSPHATE 10 MG/ML
10 INJECTION, SOLUTION INTRAMUSCULAR; INTRAVENOUS ONCE
Status: COMPLETED | OUTPATIENT
Start: 2025-05-02 | End: 2025-05-02

## 2025-05-02 RX ORDER — PALONOSETRON 0.05 MG/ML
0.25 INJECTION, SOLUTION INTRAVENOUS ONCE
Status: COMPLETED | OUTPATIENT
Start: 2025-05-02 | End: 2025-05-02

## 2025-05-02 RX ORDER — SODIUM CHLORIDE AND POTASSIUM CHLORIDE 150; 900 MG/100ML; MG/100ML
INJECTION, SOLUTION INTRAVENOUS ONCE
Status: COMPLETED | OUTPATIENT
Start: 2025-05-02 | End: 2025-05-02

## 2025-05-02 RX ORDER — SODIUM CHLORIDE 9 MG/ML
5-250 INJECTION, SOLUTION INTRAVENOUS PRN
Status: DISCONTINUED | OUTPATIENT
Start: 2025-05-02 | End: 2025-05-03 | Stop reason: HOSPADM

## 2025-05-02 RX ADMIN — MAGNESIUM SULFATE HEPTAHYDRATE 1000 MG: 1 INJECTION, SOLUTION INTRAVENOUS at 11:31

## 2025-05-02 RX ADMIN — SODIUM CHLORIDE 150 MG: 0.9 INJECTION, SOLUTION INTRAVENOUS at 12:39

## 2025-05-02 RX ADMIN — CISPLATIN 68 MG: 1 INJECTION, SOLUTION INTRAVENOUS at 13:09

## 2025-05-02 RX ADMIN — POTASSIUM CHLORIDE AND SODIUM CHLORIDE: 900; 150 INJECTION, SOLUTION INTRAVENOUS at 11:31

## 2025-05-02 RX ADMIN — SODIUM CHLORIDE 250 ML/HR: 0.9 INJECTION, SOLUTION INTRAVENOUS at 11:30

## 2025-05-02 RX ADMIN — MAGNESIUM SULFATE HEPTAHYDRATE 1000 MG: 1 INJECTION, SOLUTION INTRAVENOUS at 14:15

## 2025-05-02 RX ADMIN — DEXAMETHASONE SODIUM PHOSPHATE 10 MG: 10 INJECTION, SOLUTION INTRAMUSCULAR; INTRAVENOUS at 12:32

## 2025-05-02 RX ADMIN — SODIUM CHLORIDE, PRESERVATIVE FREE 10 ML: 5 INJECTION INTRAVENOUS at 10:48

## 2025-05-02 RX ADMIN — PALONOSETRON 0.25 MG: 0.05 INJECTION, SOLUTION INTRAVENOUS at 12:32

## 2025-05-02 RX ADMIN — SODIUM CHLORIDE, PRESERVATIVE FREE 10 ML: 5 INJECTION INTRAVENOUS at 15:18

## 2025-05-02 RX ADMIN — HEPARIN 500 UNITS: 100 SYRINGE at 15:18

## 2025-05-02 NOTE — PROGRESS NOTES
Pt here for C1D22 Cisplatin. Pt seen by Dr Mota prior to tx, refer to his note. Labs drawn from port and results reviewed. Pt was treated without incident and d/c'd in stable condition. Pt will return on 5-9-25 for C1D29.

## 2025-05-02 NOTE — PROGRESS NOTES
hoarseness of voice.  Positive right ear pain.  Respiratory: negative for cough , sputum, dyspnea, wheezing, hemoptysis, chest pain   Cardiovascular: negative for chest pain, dyspnea, palpitations, orthopnea, PND   Gastrointestinal: negative for nausea, vomiting, diarrhea, constipation, abdominal pain, Dysphagia, hematemesis and hematochezia   Genitourinary: negative for frequency, dysuria, nocturia, urinary incontinence, and hematuria   Integument: negative for rash, skin lesions, bruises.   Hematologic/Lymphatic: negative for easy bruising, bleeding, lymphadenopathy, or petechiae   Endocrine: negative for heat or cold intolerance,weight changes, change in bowel habits and hair loss   Musculoskeletal: negative for myalgias, arthralgias, pain, joint swelling,and bone pain   Neurological: negative for headaches, dizziness, seizures, weakness, numbness    Physical Exam:  Vitals: /70   Pulse 77   Temp 98.5 °F (36.9 °C) (Temporal)   Resp 18   Wt 59.4 kg (131 lb)   SpO2 96%   BMI 21.14 kg/m²   General appearance - well appearing, no in pain or distress  Mental status - AAO X3  Eyes - pupils equal and reactive, extraocular eye movements intact  Mouth - mucous membranes moist, pharynx normal without lesions  Neck - supple, no significant adenopathy  Lymphatics - no palpable lymphadenopathy, no hepatosplenomegaly  Chest - clear to auscultation, no wheezes, rales or rhonchi, symmetric air entry  Heart - normal rate, regular rhythm, normal S1, S2, no murmurs  Abdomen - soft, nontender, nondistended, no masses or organomegaly  Neurological - alert, oriented, normal speech, no focal findings or movement disorder noted  Extremities - peripheral pulses normal, no pedal edema, no clubbing or cyanosis  Skin - normal coloration and turgor, no rashes, no suspicious skin lesions noted     DATA:  Results for orders placed or performed during the hospital encounter of 04/25/25   Magnesium   Result Value Ref Range

## 2025-05-02 NOTE — TELEPHONE ENCOUNTER
Patient called stating he is ready for his return ride.  called insurance provider and requested ride.

## 2025-05-05 ENCOUNTER — HOSPITAL ENCOUNTER (OUTPATIENT)
Dept: RADIATION ONCOLOGY | Age: 65
End: 2025-05-05
Payer: MEDICARE

## 2025-05-05 ENCOUNTER — HOSPITAL ENCOUNTER (OUTPATIENT)
Dept: INFUSION THERAPY | Age: 65
Discharge: HOME | End: 2025-05-05

## 2025-05-05 ENCOUNTER — HOSPITAL ENCOUNTER (OUTPATIENT)
Dept: INTERVENTIONAL RADIOLOGY/VASCULAR | Age: 65
Discharge: HOME | End: 2025-05-07
Payer: MEDICARE

## 2025-05-05 VITALS
TEMPERATURE: 97.5 F | RESPIRATION RATE: 14 BRPM | OXYGEN SATURATION: 95 % | WEIGHT: 131 LBS | DIASTOLIC BLOOD PRESSURE: 65 MMHG | BODY MASS INDEX: 21.05 KG/M2 | HEART RATE: 66 BPM | SYSTOLIC BLOOD PRESSURE: 166 MMHG | HEIGHT: 66 IN

## 2025-05-05 DIAGNOSIS — C32.1 MALIGNANT NEOPLASM OF SUPRAGLOTTIS (HCC): Primary | ICD-10-CM

## 2025-05-05 DIAGNOSIS — C32.1 MALIGNANT NEOPLASM OF SUPRAGLOTTIS (HCC): ICD-10-CM

## 2025-05-05 LAB
INR PPP: 0.9
PARTIAL THROMBOPLASTIN TIME: 26.9 SEC (ref 23–36.5)
PLATELET # BLD AUTO: 239 K/UL (ref 138–453)
PROTHROMBIN TIME: 12.5 SEC (ref 11.7–14.9)

## 2025-05-05 PROCEDURE — 49440 PLACE GASTROSTOMY TUBE PERC: CPT

## 2025-05-05 PROCEDURE — 85610 PROTHROMBIN TIME: CPT

## 2025-05-05 PROCEDURE — 7100000041 HC SPAR PHASE II RECOVERY - ADDTL 15 MIN: Performed by: RADIOLOGY

## 2025-05-05 PROCEDURE — 6360000002 HC RX W HCPCS: Performed by: RADIOLOGY

## 2025-05-05 PROCEDURE — 36415 COLL VENOUS BLD VENIPUNCTURE: CPT

## 2025-05-05 PROCEDURE — 6360000004 HC RX CONTRAST MEDICATION: Performed by: RADIOLOGY

## 2025-05-05 PROCEDURE — 85730 THROMBOPLASTIN TIME PARTIAL: CPT

## 2025-05-05 PROCEDURE — 2709999900 IR FLUORO GUIDED GASTROSTOMY TUBE INSERTION PERC W CONTRAST

## 2025-05-05 PROCEDURE — 7100000040 HC SPAR PHASE II RECOVERY - FIRST 15 MIN: Performed by: RADIOLOGY

## 2025-05-05 PROCEDURE — 6360000002 HC RX W HCPCS: Performed by: PHYSICIAN ASSISTANT

## 2025-05-05 PROCEDURE — 85049 AUTOMATED PLATELET COUNT: CPT

## 2025-05-05 RX ORDER — GLUCAGON 1 MG/ML
KIT INJECTION PRN
Status: COMPLETED | OUTPATIENT
Start: 2025-05-05 | End: 2025-05-05

## 2025-05-05 RX ORDER — 0.9 % SODIUM CHLORIDE 0.9 %
1000 INTRAVENOUS SOLUTION INTRAVENOUS ONCE
Status: CANCELLED
Start: 2025-05-12

## 2025-05-05 RX ORDER — SODIUM CHLORIDE 9 MG/ML
5-250 INJECTION, SOLUTION INTRAVENOUS PRN
Status: CANCELLED | OUTPATIENT
Start: 2025-05-05

## 2025-05-05 RX ORDER — FAMOTIDINE 10 MG/ML
20 INJECTION, SOLUTION INTRAVENOUS
Status: CANCELLED | OUTPATIENT
Start: 2025-05-05

## 2025-05-05 RX ORDER — FENTANYL CITRATE 50 UG/ML
INJECTION, SOLUTION INTRAMUSCULAR; INTRAVENOUS PRN
Status: COMPLETED | OUTPATIENT
Start: 2025-05-05 | End: 2025-05-05

## 2025-05-05 RX ORDER — ACETAMINOPHEN 325 MG/1
650 TABLET ORAL
Status: CANCELLED | OUTPATIENT
Start: 2025-05-05

## 2025-05-05 RX ORDER — IOPAMIDOL 755 MG/ML
100 INJECTION, SOLUTION INTRAVASCULAR
Status: COMPLETED | OUTPATIENT
Start: 2025-05-05 | End: 2025-05-05

## 2025-05-05 RX ORDER — HEPARIN 100 UNIT/ML
500 SYRINGE INTRAVENOUS PRN
Status: DISCONTINUED | OUTPATIENT
Start: 2025-05-05 | End: 2025-05-06 | Stop reason: HOSPADM

## 2025-05-05 RX ORDER — SODIUM CHLORIDE 0.9 % (FLUSH) 0.9 %
5-40 SYRINGE (ML) INJECTION PRN
Status: DISCONTINUED | OUTPATIENT
Start: 2025-05-05 | End: 2025-05-06 | Stop reason: HOSPADM

## 2025-05-05 RX ORDER — EPINEPHRINE 1 MG/ML
0.3 INJECTION, SOLUTION, CONCENTRATE INTRAVENOUS PRN
Status: CANCELLED | OUTPATIENT
Start: 2025-05-05

## 2025-05-05 RX ORDER — ONDANSETRON 2 MG/ML
8 INJECTION INTRAMUSCULAR; INTRAVENOUS
Status: CANCELLED | OUTPATIENT
Start: 2025-05-05

## 2025-05-05 RX ORDER — SODIUM CHLORIDE 9 MG/ML
INJECTION, SOLUTION INTRAVENOUS CONTINUOUS
Status: DISCONTINUED | OUTPATIENT
Start: 2025-05-05 | End: 2025-05-08 | Stop reason: HOSPADM

## 2025-05-05 RX ORDER — CLINDAMYCIN PHOSPHATE 600 MG/50ML
600 INJECTION, SOLUTION INTRAVENOUS ONCE
Status: COMPLETED | OUTPATIENT
Start: 2025-05-05 | End: 2025-05-05

## 2025-05-05 RX ORDER — SODIUM CHLORIDE 9 MG/ML
INJECTION, SOLUTION INTRAVENOUS CONTINUOUS
Status: CANCELLED | OUTPATIENT
Start: 2025-05-05

## 2025-05-05 RX ORDER — DIPHENHYDRAMINE HYDROCHLORIDE 50 MG/ML
50 INJECTION, SOLUTION INTRAMUSCULAR; INTRAVENOUS
Status: CANCELLED | OUTPATIENT
Start: 2025-05-05

## 2025-05-05 RX ORDER — ALBUTEROL SULFATE 90 UG/1
4 INHALANT RESPIRATORY (INHALATION) PRN
Status: CANCELLED | OUTPATIENT
Start: 2025-05-05

## 2025-05-05 RX ORDER — HYDROCORTISONE SODIUM SUCCINATE 100 MG/2ML
100 INJECTION INTRAMUSCULAR; INTRAVENOUS
Status: CANCELLED | OUTPATIENT
Start: 2025-05-05

## 2025-05-05 RX ORDER — SODIUM CHLORIDE 0.9 % (FLUSH) 0.9 %
5-40 SYRINGE (ML) INJECTION PRN
Status: CANCELLED | OUTPATIENT
Start: 2025-05-05

## 2025-05-05 RX ORDER — HEPARIN 100 UNIT/ML
500 SYRINGE INTRAVENOUS PRN
Status: CANCELLED | OUTPATIENT
Start: 2025-05-05

## 2025-05-05 RX ORDER — 0.9 % SODIUM CHLORIDE 0.9 %
1000 INTRAVENOUS SOLUTION INTRAVENOUS ONCE
Status: DISCONTINUED | OUTPATIENT
Start: 2025-05-05 | End: 2025-05-06 | Stop reason: HOSPADM

## 2025-05-05 RX ADMIN — GLUCAGON 1 MG: KIT at 11:14

## 2025-05-05 RX ADMIN — FENTANYL CITRATE 50 MCG: 50 INJECTION, SOLUTION INTRAMUSCULAR; INTRAVENOUS at 11:08

## 2025-05-05 RX ADMIN — FENTANYL CITRATE 50 MCG: 50 INJECTION, SOLUTION INTRAMUSCULAR; INTRAVENOUS at 11:18

## 2025-05-05 RX ADMIN — IOPAMIDOL 35 ML: 755 INJECTION, SOLUTION INTRAVENOUS at 11:41

## 2025-05-05 RX ADMIN — CLINDAMYCIN IN 5 PERCENT DEXTROSE 600 MG: 12 INJECTION, SOLUTION INTRAVENOUS at 09:20

## 2025-05-05 ASSESSMENT — PAIN SCALES - GENERAL
PAINLEVEL_OUTOF10: 0

## 2025-05-05 ASSESSMENT — PAIN - FUNCTIONAL ASSESSMENT: PAIN_FUNCTIONAL_ASSESSMENT: 0-10

## 2025-05-05 ASSESSMENT — PAIN DESCRIPTION - DESCRIPTORS: DESCRIPTORS: OTHER (COMMENT)

## 2025-05-05 NOTE — H&P
History and Physical    Pt Name: Matt Mao  MRN: [id number]  YOB: 1960  Date of evaluation: 5/5/2025  Primary Care Physician: Nolan Felix PA    SUBJECTIVE:   History of Chief Complaint:    Matt Mao is a 64 y.o. male who is scheduled today for IR FLUORO GUIDED GASTROSTOMY TUBE INSERTION PERC W CONTRAST. He reports being diagnosed with supraglottic laryngeal cancer after a 3 month history of voice hoarseness and intermittent throat pain. He is current on chemotherapy once a week on Fridays, and radiation 5 days a week. He reports his throat pain is overall controlled with oral pain medication, but reports his pain a 9/10 today, states mainly triggered by swallowing. He reports a decent appetite, and reports a 5 lb weight loss since diagnosis.   Allergies  is allergic to adhesive tape, desonide, and penicillins.  Medications  Prior to Admission medications    Medication Sig Start Date End Date Taking? Authorizing Provider   HYDROcodone-acetaminophen (NORCO) 7.5-325 MG per tablet Take 1 tablet by mouth every 6 hours as needed for Pain for up to 30 days. Intended supply: 3 days. Take lowest dose possible to manage pain Max Daily Amount: 4 tablets 4/25/25 5/25/25 Yes Shelley Finnegan MD   HYDROcodone-acetaminophen (NORCO) 5-325 MG per tablet Take 1 tablet by mouth every 8 hours as needed for Pain for up to 30 days. Intended supply: 3 days. Take lowest dose possible to manage pain Max Daily Amount: 3 tablets  Patient taking differently: Take 1 tablet by mouth every 8 hours as needed for Pain. 4/23/25-per aide Carlson to take this every 6 hours as needed 4/16/25 5/16/25 Yes Shelley Finnegan MD   ondansetron (ZOFRAN-ODT) 8 MG TBDP disintegrating tablet Take 0.5 tablets by mouth every 8 hours as needed for Nausea or Vomiting 4/11/25  Yes Chin Mota MD   betamethasone valerate (VALISONE) 0.1 % cream Apply topically 2 times daily. 3/24/25  Yes Morales Hernandez MD   Garlic 100 MG TABS  Awake/Alert

## 2025-05-05 NOTE — POST SEDATION
Sedation Post Procedure Note    Patient Name: Matt Mao   YOB: 1960  Room/Bed: Room/bed info not found  Medical Record Number: [id number]  Date: 5/5/2025   Time: 11:37 AM         Physicians/Assistants: ADAMA Vick    Procedure Performed:  G-Tube placement    Post-Sedation Vital Signs:  Vitals:    05/05/25 1130   BP:    Pulse: 86   Resp:    Temp:    SpO2:       Vital signs were reviewed and were stable after the procedure (see flow sheet for vitals)            Post-Sedation Exam: Pt remains stable           Complications: none    Electronically signed by ADAMA Vick on 5/5/2025 at 11:37 AM

## 2025-05-05 NOTE — FLOWSHEET NOTE
Pt returns per stretcher on Room Air alert and awake accompanied by Verenice RN from IR dept s/p LUQ percutaneous Gastrostomy tube placement under fluoroscopy. Per Verenice CRAFT from IR, okay for patient to resume usual diet as before and normal activity and can get up to bathroom if needed but needs to stay here for 3 hours and GT to gravity drainage for next 24 hours and then pt will need to return to IR dept only to have repeat IV dye to check to make sure the GT is adhering to the abdominal wall and no peritoneal signs.  VS obtained.  Bulky dry dressing with foam tape overtop clean, dry and intact surrounding Gastrostomy tube just placed in IR today and draining to standard bag clear/yellow drainage without issues.  Call light within reach.  Ice water provided.

## 2025-05-05 NOTE — DISCHARGE INSTRUCTIONS
Call your doctor for the following:   Chills   Temperature greater than 101   Pain that is not tolerable despite taking pain medicine as ordered   There is increased swelling, redness or warmth at gastrostomy tube site   There is increased drainage or bleeding from gastrostomy tube site   Do not remove surgical dressing unless instructed to do so by Dr. Hernandez.  Keep Gastrostomy tube to [place] drain for 24 hours.  Pt return to IR tomorrow for imaging.         Percutaneous Endoscopic Gastrostomy: What to Expect at Home  Your Recovery  PEG is a procedure to make an opening between the skin of your belly and your stomach. The doctor put a thin tube called a gastrostomy tube (also called G-tube, PEG tube, or feeding tube) into your stomach through the opening. The tube can put liquid nutrition, fluid, and medicines directly into your stomach. The tube also may be used to drain liquid or air from the stomach.  Your belly may feel sore, like you pulled a muscle, for several days. It will take about a week for the skin around your feeding tube to heal. You may have some yellowish mucus where the feeding tube comes out of your belly. This is normal. It's not a sign of infection.  You will need to learn how to use and care for your feeding tube. Your doctor may recommend that you have a nurse or dietitian visit you at home to help you get started with your feeding tube. At first you may need a friend or family member to help you with your tube feedings. But with practice, you may be able to do it yourself.  A feeding tube can break down over time. If this happens, the tube will be removed and replaced. Sometimes a tube is removed if you have an infection that is getting worse. Sometimes a tube will come out by itself. Your doctor will give you instructions about what to do if this happens.  This care sheet gives you a general idea about how long it will take for you to recover. But each person recovers at a different pace.

## 2025-05-05 NOTE — PRE SEDATION
Sedation Pre-Procedure Note    Patient Name: Matt Mao   YOB: 1960  Room/Bed: Room/bed info not found  Medical Record Number: [id number]  Date: 5/5/2025   Time: 9:44 AM       Indication:  G-Tube placement    Consent: I have discussed with the patient and/or the patient representative the indication, alternatives, and the possible risks and/or complications of the planned procedure and the anesthesia methods. The patient and/or patient representative appear to understand and agree to proceed.    Vital Signs:   Vitals:    05/05/25 0824   BP: (!) 162/66   Pulse: 61   Resp: 18   Temp: 97.9 °F (36.6 °C)   SpO2: 98%       Past Medical History:   has a past medical history of Cancer (HCC), COPD (chronic obstructive pulmonary disease) (HCC), Full dentures, History of blood transfusion, Hx of blood clots, Hypertension, Laryngeal mass, PAD (peripheral artery disease), Poor historian, Under care of podiatry surgeon, Wears glasses, and Wellness examination.    Past Surgical History:   has a past surgical history that includes Leg Surgery (Right); Abdomen surgery (1992); Colonoscopy (2024); laryngoscopy (03/03/2025); laryngoscopy (N/A, 03/03/2025); Portacath placement (Right, 03/26/2025); and IR PORT PLACEMENT > 5 YEARS (3/26/2025).    Medications:   Scheduled Meds:    clindamycin (CLEOCIN) IV  600 mg IntraVENous Once     Continuous Infusions:    sodium chloride       PRN Meds:   Home Meds:   Prior to Admission medications    Medication Sig Start Date End Date Taking? Authorizing Provider   HYDROcodone-acetaminophen (NORCO) 7.5-325 MG per tablet Take 1 tablet by mouth every 6 hours as needed for Pain for up to 30 days. Intended supply: 3 days. Take lowest dose possible to manage pain Max Daily Amount: 4 tablets 4/25/25 5/25/25 Yes Shelley Finnegan MD   HYDROcodone-acetaminophen (NORCO) 5-325 MG per tablet Take 1 tablet by mouth every 8 hours as needed for Pain for up to 30 days. Intended supply: 3 days. Take

## 2025-05-05 NOTE — BRIEF OP NOTE
Brief Postoperative Note     Matt Mao  YOB: 1960  [id number]    Pre-operative Diagnosis: Laryngeal mass    Post-operative Diagnosis: Same    Procedure: G-tube Insertion    Medication Given: fentanyl    Anesthesia: 1% Lidocaine Local Injection    Surgeons/Assistants: MD Mary and ADAMA Vick    Estimated Blood Loss: Minimal    Complications: none    17 F DAVID G-tube inserted under fluoro guidance. Tube tip in good position, and satisfactory function demonstrated. Will be ready for use in 24 hours if no peritoneal signs.    Electronically signed by ADAMA Vick on 5/5/2025 at 11:38 AM

## 2025-05-05 NOTE — FLOWSHEET NOTE
Pt taken per wheelchair per [facility] PCT to surgery entrance door and helped into pt's son's car and discharged to home without issues.

## 2025-05-05 NOTE — FLOWSHEET NOTE
Writer called IR dept and spoke with Enma to clarify Diet orders as none found.  Orders received for Clear Liquid diet until pt returns to IR dept tomorrow morning for repeat imaging and then will resume usual normal diet if no issues.

## 2025-05-06 ENCOUNTER — TELEPHONE (OUTPATIENT)
Age: 65
End: 2025-05-06

## 2025-05-06 ENCOUNTER — HOSPITAL ENCOUNTER (OUTPATIENT)
Dept: INTERVENTIONAL RADIOLOGY/VASCULAR | Age: 65
Discharge: HOME | End: 2025-05-08
Payer: MEDICARE

## 2025-05-06 DIAGNOSIS — C32.1 MALIGNANT NEOPLASM OF SUPRAGLOTTIS (HCC): ICD-10-CM

## 2025-05-06 PROCEDURE — 6360000004 HC RX CONTRAST MEDICATION: Performed by: RADIOLOGY

## 2025-05-06 PROCEDURE — 49465 FLUORO EXAM OF G/COLON TUBE: CPT

## 2025-05-06 RX ADMIN — IOHEXOL 30 ML: 240 INJECTION, SOLUTION INTRATHECAL; INTRAVASCULAR; INTRAVENOUS; ORAL at 15:21

## 2025-05-06 NOTE — PROGRESS NOTES
Pt arrives to IR for gtube check post insertion  5/5/25  Dr Hernandez to bedside  [facility] RT to bedside  Dye injected and images viewed  Ok to use  Dc home

## 2025-05-06 NOTE — PROGRESS NOTES
Contrast injection showed G-Tube in appropriate position and demonstrates appropriate function.  No peritoneal signs.  Okay to use.

## 2025-05-06 NOTE — TELEPHONE ENCOUNTER
set up transportation for next week's treatment (5/12-16) through insurance provider. 5/14 patient to be taken to PT then Radiation.    Transportation details:  [facility] [phone]   9:00am 10:30am 9:00am 10:30am 10:00am  Confirmation [id number]  Call for return ride (5/12 5/16), return ride 12:15pm

## 2025-05-07 ENCOUNTER — HOSPITAL ENCOUNTER (OUTPATIENT)
Dept: PHYSICAL THERAPY | Facility: CLINIC | Age: 65
Setting detail: THERAPIES SERIES
Discharge: HOME | End: 2025-05-07
Payer: MEDICARE

## 2025-05-07 ENCOUNTER — HOSPITAL ENCOUNTER (OUTPATIENT)
Dept: RADIATION ONCOLOGY | Age: 65
Discharge: HOME | End: 2025-05-07
Payer: MEDICARE

## 2025-05-07 ENCOUNTER — CLINICAL DOCUMENTATION (OUTPATIENT)
Age: 65
End: 2025-05-07

## 2025-05-07 VITALS
RESPIRATION RATE: 16 BRPM | OXYGEN SATURATION: 97 % | BODY MASS INDEX: 21.08 KG/M2 | SYSTOLIC BLOOD PRESSURE: 148 MMHG | WEIGHT: 130.6 LBS | DIASTOLIC BLOOD PRESSURE: 74 MMHG | TEMPERATURE: 98.2 F | HEART RATE: 71 BPM

## 2025-05-07 PROCEDURE — 77386 HC NTSTY MODUL RAD TX DLVR CPLX: CPT | Performed by: RADIOLOGY

## 2025-05-07 PROCEDURE — 77336 RADIATION PHYSICS CONSULT: CPT | Performed by: RADIOLOGY

## 2025-05-07 PROCEDURE — 97140 MANUAL THERAPY 1/> REGIONS: CPT

## 2025-05-07 ASSESSMENT — PAIN SCALES - GENERAL: PAINLEVEL_OUTOF10: 2

## 2025-05-07 ASSESSMENT — PAIN DESCRIPTION - LOCATION: LOCATION: ABDOMEN

## 2025-05-07 NOTE — FLOWSHEET NOTE
[] [facility]  Outpatient Rehabilitation &  Therapy  [address]  [phone]  [phone] [] [facility]  Outpatient Rehabilitation &  Therapy  [address]  P: [phone]  F: [phone] [x] [facility]  Outpatient Rehabilitation &  Therapy  [address]  P: [phone]  F: [phone] [] [facility]  Outpatient Rehabilitation &  Therapy  [address]  [phone]  [phone] [] [facility]  Outpatient Rehabilitation &  Therapy  [address]   P: [phone]  F: [phone]  [] [facility]  Outpatient Rehabilitation &  Therapy  [address]  P: [phone]  F: [phone] [] [facility]  Outpatient Rehabilitation &  Therapy  [address]  P: [phone]  F: [phone] [] [facility]  Outpatient Rehabilitation &  Therapy  [address]  P: [phone]  F: [phone] [] [facility]  Outpatient Rehabilitation &  Therapy  [address]  P: [phone]  F: [phone]  [] [facility] Outpatient Rehabilitation &  Therapy  [address]  P: [phone]  F: [phone]     Physical Therapy Daily Treatment Note    Date:  2025  Patient Name:  Matt Mao    :  1960  MRN: [id number]  Physician: Shelley Finnegna          Insurance: [facility] MEDICARE DUAL, [facility] Medicaid visits BMN  Medical Diagnosis: C32.1 Malignant neoplasm of supraglottis         Rehab Codes:  25.611, 25.612, R53.0, L90.5, R29.3   Onset date: 3/14/25 script                Next Dr's appt.: ongoing     Visit# / total visits: ; Progress note for Medicare patient due at visit 10     Cancels/No Shows: 0    Subjective:  Pt arrived with sig throat and abdominal pain, recently received a feeding tube two days ago. States he cont

## 2025-05-07 NOTE — PROGRESS NOTES
[facility]- MEDICAL NUTRITION THERAPY     Visit Type: Follow-up     NUTRITION RECOMMENDATIONS / MONITORING / EVALUATION  Continue diet as tolerated. Recommended continuation of oral nutrition supplements.   Will continue to monitor PO tolerance, adequacy of intake, and weight; monitor need for nutrition support via PEG tube.     Subjective/Current Data:  Matt Mao is a 64 y.o. 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   Pt states he continues to tolerate oral diet well. Taste is a little off, but swallowing ok. Continues to use Boost oral nutrition supplements; states he doesn't always drink TID. PEG was placed by IR on 5/5//25. Use of tube/water flush demonstrated by RN today; tube flushed without difficulty, per RN. Writer encouraged patient to flush with water daily.     Objective Data:  Patient Active Problem List    Diagnosis Date Noted    Malignant neoplasm of supraglottis (HCC) 03/13/2025    Laryngeal mass 02/21/2025     Anthropometric Measures:  Height: 5' 6\"  Weight: 130 lb 9.6 oz (59.2 kg)  Ideal Body Weight: 142 lb (64.5 kg)  BMI: 21.08 kg/m2 (Normal Weight)    Wt Readings from Last 20 Encounters:   05/07/25 59.2 kg (130 lb 9.6 oz)   05/05/25 59.4 kg (131 lb)   05/02/25 59.4 kg (131 lb)   04/30/25 61.1 kg (134 lb 9.6 oz)   04/25/25 61.2 kg (135 lb)   04/23/25 58.9 kg (129 lb 12.8 oz)   04/23/25 58.9 kg (129 lb 12.8 oz)   04/18/25 62.3 kg (137 lb 6.4 oz)   04/16/25 63 kg (138 lb 12.8 oz)   04/11/25 62.5 kg (137 lb 12.8 oz)   04/07/25 62.8 kg (138 lb 6.4 oz)   03/07/25 61.3 kg (135 lb 3.2 oz)   03/07/25 61.3 kg (135 lb 3.2 oz)   02/28/25 61.7 kg (136 lb)   02/21/25 61.1 kg (134 lb 12.8 oz)     Estimated Nutrition Needs:  Estimated Calorie Needs: 1800 kcal/day   Estimated Protein Needs: 70-90 g pro/day   Estimated Fluid Needs: 1800 mL/day      Current Nutrition Regimen:  Oral Diet: Diet as tolerated  Oral Supplement: Boost

## 2025-05-07 NOTE — PROGRESS NOTES
Matt BRANDON Mao  5/7/2025  Wt Readings from Last 3 Encounters:   05/07/25 59.2 kg (130 lb 9.6 oz)   05/05/25 59.4 kg (131 lb)   05/02/25 59.4 kg (131 lb)     Body mass index is 21.08 kg/m².        Treatment Area:Larynx (with chemo)    Patient was seen today for weekly visit.     Comfort Alteration  Fatigue: Mild    Mucous Membrane Alteration  Mucositis Due to Radiation: No  Thrush: No  Voice Changes:Yes- raspy    Nutritional Alteration-No tube  Anorexia: Yes  Nausea: No  Vomiting: No     Ventilation Alteration  Cough:Occasional    Skin Alteration   Sensation:intact    Radiation Dermatitis:  Intact [x]     Erythema  []     Discoloration  [x]    Rash []     Dry desquamation  []     Moist desquamation []       Emotional  Coping: effective      Injury, potential bleeding or infection: skin care BID reinforced    Lab Results   Component Value Date    WBC 6.1 05/02/2025    HGB 9.9 (L) 05/02/2025    HCT 29.5 (L) 05/02/2025     05/05/2025         BP (!) 148/74   Pulse 71   Temp 98.2 °F (36.8 °C) (Temporal)   Resp 16   Wt 59.2 kg (130 lb 9.6 oz)   SpO2 97%   BMI 21.08 kg/m²      Pain Assessment: 0-10  Pain Level: 2         Assessment/Plan: Patient was seen today for weekly visit. He arrives ambulatory with steady gait.  No oral thrush noted.  Patient reports minimal pain with swallowing but complains of pain at PEG tube site.  Patient using \"ace wrap\" to keep tube in place. He was provided with tube holders and instruction for use.  He voices understanding.  Dr. Finnegan evaluated patient.  Continue plan of care. Huyen, Oncology Dietician met with patient at this visit.  Patient states he received no instruction on PEG tube care and has not flushed since placement.  Writer demonstrated and provided patient with supplies for flushing PEG tube daily.  Patient voices understanding.        Elsie Pereira RN  
Prescriptions    No medications on file       Other Orders Placed:  No orders of the defined types were placed in this encounter.

## 2025-05-08 ENCOUNTER — HOSPITAL ENCOUNTER (OUTPATIENT)
Dept: RADIATION ONCOLOGY | Age: 65
Discharge: HOME | End: 2025-05-08
Payer: MEDICARE

## 2025-05-08 ENCOUNTER — TELEPHONE (OUTPATIENT)
Age: 65
End: 2025-05-08

## 2025-05-08 ENCOUNTER — TELEPHONE (OUTPATIENT)
Dept: PALLATIVE CARE | Age: 65
End: 2025-05-08

## 2025-05-08 PROCEDURE — 77386 HC NTSTY MODUL RAD TX DLVR CPLX: CPT | Performed by: RADIOLOGY

## 2025-05-08 NOTE — TELEPHONE ENCOUNTER
Called and spoke with Matt.  I offered him an appointment with [facility]. Explained referral is from Dr. Finnegan, explained what palliative care seeks to do for patient.  Pt is agreeable with appointment.  He relates Tyler  sets up all his rides.  He asked me to talk with him to organize appt and ride.  Pt relates he is feeling pretty good and is so busy right now.  I offered him an appointment 6/3/25 which looks like he will be done with radiation treatments by then so he may not feel so overwhelmed.    He is agreeable.  Scheduled patient for 2:30pm on 6/3/25.  Called Tyler and he will set up transportation for above date, time and location.     [facility] Palliative Care Coordinator  Megan KON, RN  [facility] [phone]/ [facility] [phone]/ [facility] [phone]

## 2025-05-08 NOTE — TELEPHONE ENCOUNTER
received call from Palliative Care Nurse stating patient scheduled to be 6/3.  will schedule ride closer to appointment date.

## 2025-05-09 ENCOUNTER — HOSPITAL ENCOUNTER (OUTPATIENT)
Dept: RADIATION ONCOLOGY | Age: 65
Discharge: HOME | End: 2025-05-09
Payer: MEDICARE

## 2025-05-09 ENCOUNTER — OFFICE VISIT (OUTPATIENT)
Age: 65
End: 2025-05-09

## 2025-05-09 ENCOUNTER — TELEPHONE (OUTPATIENT)
Age: 65
End: 2025-05-09

## 2025-05-09 ENCOUNTER — HOSPITAL ENCOUNTER (OUTPATIENT)
Dept: INFUSION THERAPY | Age: 65
Discharge: HOME | End: 2025-05-09
Payer: MEDICARE

## 2025-05-09 VITALS
HEART RATE: 82 BPM | TEMPERATURE: 97.9 F | DIASTOLIC BLOOD PRESSURE: 68 MMHG | SYSTOLIC BLOOD PRESSURE: 145 MMHG | WEIGHT: 134 LBS | RESPIRATION RATE: 18 BRPM | BODY MASS INDEX: 21.63 KG/M2

## 2025-05-09 DIAGNOSIS — Z51.11 CHEMOTHERAPY MANAGEMENT, ENCOUNTER FOR: ICD-10-CM

## 2025-05-09 DIAGNOSIS — C32.1 MALIGNANT NEOPLASM OF SUPRAGLOTTIS (HCC): Primary | ICD-10-CM

## 2025-05-09 LAB
ALBUMIN SERPL-MCNC: 3.7 G/DL (ref 3.5–5.2)
ALBUMIN/GLOB SERPL: 1.4 {RATIO} (ref 1–2.5)
ALP SERPL-CCNC: 90 U/L (ref 40–129)
ALT SERPL-CCNC: 14 U/L (ref 10–50)
ANION GAP SERPL CALCULATED.3IONS-SCNC: 11 MMOL/L (ref 9–16)
AST SERPL-CCNC: 24 U/L (ref 10–50)
BASOPHILS # BLD: 0 K/UL (ref 0–0.2)
BASOPHILS NFR BLD: 1 % (ref 0–2)
BILIRUB SERPL-MCNC: <0.2 MG/DL (ref 0–1.2)
BUN SERPL-MCNC: 19 MG/DL (ref 8–23)
CALCIUM SERPL-MCNC: 8.3 MG/DL (ref 8.6–10.4)
CHLORIDE SERPL-SCNC: 102 MMOL/L (ref 98–107)
CO2 SERPL-SCNC: 26 MMOL/L (ref 20–31)
CREAT SERPL-MCNC: 1.2 MG/DL (ref 0.7–1.2)
EOSINOPHIL # BLD: 0.1 K/UL (ref 0–0.4)
EOSINOPHILS RELATIVE PERCENT: 3 % (ref 1–4)
ERYTHROCYTE [DISTWIDTH] IN BLOOD BY AUTOMATED COUNT: 13.7 % (ref 12.5–15.4)
GFR, ESTIMATED: 67 ML/MIN/1.73M2
GLUCOSE SERPL-MCNC: 102 MG/DL (ref 74–99)
HCT VFR BLD AUTO: 28.5 % (ref 41–53)
HGB BLD-MCNC: 9.4 G/DL (ref 13.5–17.5)
LYMPHOCYTES NFR BLD: 0.5 K/UL (ref 1–4.8)
LYMPHOCYTES RELATIVE PERCENT: 14 % (ref 24–44)
MAGNESIUM SERPL-MCNC: 1.3 MG/DL (ref 1.6–2.4)
MCH RBC QN AUTO: 30.7 PG (ref 26–34)
MCHC RBC AUTO-ENTMCNC: 32.8 G/DL (ref 31–37)
MCV RBC AUTO: 93.5 FL (ref 80–100)
MONOCYTES NFR BLD: 0.5 K/UL (ref 0.1–1.2)
MONOCYTES NFR BLD: 12 % (ref 2–11)
NEUTROPHILS NFR BLD: 70 % (ref 36–66)
NEUTS SEG NFR BLD: 2.7 K/UL (ref 1.8–7.7)
PLATELET # BLD AUTO: 181 K/UL (ref 140–450)
PMV BLD AUTO: 8 FL (ref 6–12)
POTASSIUM SERPL-SCNC: 3.7 MMOL/L (ref 3.7–5.3)
PROT SERPL-MCNC: 6.4 G/DL (ref 6.6–8.7)
RBC # BLD AUTO: 3.05 M/UL (ref 4.5–5.9)
SODIUM SERPL-SCNC: 139 MMOL/L (ref 136–145)
WBC OTHER # BLD: 3.9 K/UL (ref 3.5–11)

## 2025-05-09 PROCEDURE — 96375 TX/PRO/DX INJ NEW DRUG ADDON: CPT

## 2025-05-09 PROCEDURE — 96413 CHEMO IV INFUSION 1 HR: CPT

## 2025-05-09 PROCEDURE — 96368 THER/DIAG CONCURRENT INF: CPT

## 2025-05-09 PROCEDURE — 96367 TX/PROPH/DG ADDL SEQ IV INF: CPT

## 2025-05-09 PROCEDURE — 6360000002 HC RX W HCPCS: Performed by: INTERNAL MEDICINE

## 2025-05-09 PROCEDURE — 85025 COMPLETE CBC W/AUTO DIFF WBC: CPT

## 2025-05-09 PROCEDURE — 83735 ASSAY OF MAGNESIUM: CPT

## 2025-05-09 PROCEDURE — 80053 COMPREHEN METABOLIC PANEL: CPT

## 2025-05-09 PROCEDURE — 77386 HC NTSTY MODUL RAD TX DLVR CPLX: CPT | Performed by: RADIOLOGY

## 2025-05-09 PROCEDURE — 2580000003 HC RX 258: Performed by: INTERNAL MEDICINE

## 2025-05-09 PROCEDURE — 2500000003 HC RX 250 WO HCPCS: Performed by: INTERNAL MEDICINE

## 2025-05-09 PROCEDURE — 96366 THER/PROPH/DIAG IV INF ADDON: CPT

## 2025-05-09 RX ORDER — SODIUM CHLORIDE 9 MG/ML
INJECTION, SOLUTION INTRAVENOUS CONTINUOUS
OUTPATIENT
Start: 2025-05-16

## 2025-05-09 RX ORDER — DIPHENHYDRAMINE HYDROCHLORIDE 50 MG/ML
50 INJECTION, SOLUTION INTRAMUSCULAR; INTRAVENOUS
OUTPATIENT
Start: 2025-05-16

## 2025-05-09 RX ORDER — HYDROCORTISONE SODIUM SUCCINATE 100 MG/2ML
100 INJECTION INTRAMUSCULAR; INTRAVENOUS
OUTPATIENT
Start: 2025-05-16

## 2025-05-09 RX ORDER — EPINEPHRINE 1 MG/ML
0.3 INJECTION, SOLUTION, CONCENTRATE INTRAVENOUS PRN
OUTPATIENT
Start: 2025-05-16

## 2025-05-09 RX ORDER — SODIUM CHLORIDE 9 MG/ML
5-250 INJECTION, SOLUTION INTRAVENOUS PRN
OUTPATIENT
Start: 2025-05-16

## 2025-05-09 RX ORDER — DEXAMETHASONE SODIUM PHOSPHATE 10 MG/ML
10 INJECTION, SOLUTION INTRAMUSCULAR; INTRAVENOUS ONCE
Status: COMPLETED | OUTPATIENT
Start: 2025-05-09 | End: 2025-05-09

## 2025-05-09 RX ORDER — SODIUM CHLORIDE AND POTASSIUM CHLORIDE 150; 900 MG/100ML; MG/100ML
INJECTION, SOLUTION INTRAVENOUS ONCE
Status: COMPLETED | OUTPATIENT
Start: 2025-05-09 | End: 2025-05-09

## 2025-05-09 RX ORDER — SODIUM CHLORIDE 9 MG/ML
5-250 INJECTION, SOLUTION INTRAVENOUS PRN
Status: DISCONTINUED | OUTPATIENT
Start: 2025-05-09 | End: 2025-05-10 | Stop reason: HOSPADM

## 2025-05-09 RX ORDER — ONDANSETRON 2 MG/ML
8 INJECTION INTRAMUSCULAR; INTRAVENOUS
OUTPATIENT
Start: 2025-05-16

## 2025-05-09 RX ORDER — HEPARIN 100 UNIT/ML
500 SYRINGE INTRAVENOUS PRN
Status: DISCONTINUED | OUTPATIENT
Start: 2025-05-09 | End: 2025-05-10 | Stop reason: HOSPADM

## 2025-05-09 RX ORDER — MAGNESIUM SULFATE 1 G/100ML
1000 INJECTION INTRAVENOUS ONCE
Status: COMPLETED | OUTPATIENT
Start: 2025-05-09 | End: 2025-05-09

## 2025-05-09 RX ORDER — PROCHLORPERAZINE EDISYLATE 5 MG/ML
5 INJECTION INTRAMUSCULAR; INTRAVENOUS
OUTPATIENT
Start: 2025-05-16

## 2025-05-09 RX ORDER — ACETAMINOPHEN 325 MG/1
650 TABLET ORAL
OUTPATIENT
Start: 2025-05-16

## 2025-05-09 RX ORDER — PALONOSETRON 0.05 MG/ML
0.25 INJECTION, SOLUTION INTRAVENOUS ONCE
OUTPATIENT
Start: 2025-05-16

## 2025-05-09 RX ORDER — SODIUM CHLORIDE 0.9 % (FLUSH) 0.9 %
5-40 SYRINGE (ML) INJECTION PRN
OUTPATIENT
Start: 2025-05-16

## 2025-05-09 RX ORDER — MAGNESIUM SULFATE IN WATER 40 MG/ML
2000 INJECTION, SOLUTION INTRAVENOUS ONCE
Status: COMPLETED | OUTPATIENT
Start: 2025-05-09 | End: 2025-05-09

## 2025-05-09 RX ORDER — FAMOTIDINE 10 MG/ML
20 INJECTION, SOLUTION INTRAVENOUS
OUTPATIENT
Start: 2025-05-16

## 2025-05-09 RX ORDER — SODIUM CHLORIDE 0.9 % (FLUSH) 0.9 %
5-40 SYRINGE (ML) INJECTION PRN
Status: DISCONTINUED | OUTPATIENT
Start: 2025-05-09 | End: 2025-05-10 | Stop reason: HOSPADM

## 2025-05-09 RX ORDER — PALONOSETRON 0.05 MG/ML
0.25 INJECTION, SOLUTION INTRAVENOUS ONCE
Status: COMPLETED | OUTPATIENT
Start: 2025-05-09 | End: 2025-05-09

## 2025-05-09 RX ORDER — HEPARIN SODIUM (PORCINE) LOCK FLUSH IV SOLN 100 UNIT/ML 100 UNIT/ML
500 SOLUTION INTRAVENOUS PRN
OUTPATIENT
Start: 2025-05-16

## 2025-05-09 RX ORDER — MEPERIDINE HYDROCHLORIDE 50 MG/ML
12.5 INJECTION INTRAMUSCULAR; INTRAVENOUS; SUBCUTANEOUS PRN
OUTPATIENT
Start: 2025-05-16

## 2025-05-09 RX ORDER — ALBUTEROL SULFATE 90 UG/1
4 INHALANT RESPIRATORY (INHALATION) PRN
OUTPATIENT
Start: 2025-05-16

## 2025-05-09 RX ADMIN — DEXAMETHASONE SODIUM PHOSPHATE 10 MG: 10 INJECTION, SOLUTION INTRAMUSCULAR; INTRAVENOUS at 11:58

## 2025-05-09 RX ADMIN — MAGNESIUM SULFATE HEPTAHYDRATE 2000 MG: 40 INJECTION, SOLUTION INTRAVENOUS at 12:33

## 2025-05-09 RX ADMIN — POTASSIUM CHLORIDE AND SODIUM CHLORIDE: 900; 150 INJECTION, SOLUTION INTRAVENOUS at 10:52

## 2025-05-09 RX ADMIN — MAGNESIUM SULFATE HEPTAHYDRATE 1000 MG: 1 INJECTION, SOLUTION INTRAVENOUS at 14:10

## 2025-05-09 RX ADMIN — CISPLATIN 68 MG: 1 INJECTION, SOLUTION INTRAVENOUS at 12:37

## 2025-05-09 RX ADMIN — SODIUM CHLORIDE 150 MG: 0.9 INJECTION, SOLUTION INTRAVENOUS at 12:10

## 2025-05-09 RX ADMIN — MAGNESIUM SULFATE HEPTAHYDRATE 1000 MG: 1 INJECTION, SOLUTION INTRAVENOUS at 10:51

## 2025-05-09 RX ADMIN — SODIUM CHLORIDE, PRESERVATIVE FREE 10 ML: 5 INJECTION INTRAVENOUS at 15:10

## 2025-05-09 RX ADMIN — HEPARIN 500 UNITS: 100 SYRINGE at 15:10

## 2025-05-09 RX ADMIN — SODIUM CHLORIDE 100 ML/HR: 0.9 INJECTION, SOLUTION INTRAVENOUS at 10:49

## 2025-05-09 RX ADMIN — PALONOSETRON 0.25 MG: 0.05 INJECTION, SOLUTION INTRAVENOUS at 11:58

## 2025-05-09 NOTE — PROGRESS NOTES
Pt here for C.1D.29 Cisplatin.  Arrives ambulatory.  Denies any new complaints.  Labs drawn from port, results reviewed.  Mag 1.3, 4G magnesium total given.  Pt was seen by Dr. Mota, order rec'd to proceed with tx.  Tx complete without incident.  Pt d/c'd in stable condition.  Returns 5/16 for MD visit and C1D36 Cisplatin.

## 2025-05-09 NOTE — PROGRESS NOTES
cc of saline. This was pulled to the anterior wall of the stomach and used in combination with Amy disk to secure the catheter in place.  A small amount of contrast was injected verifying catheter positioning in the stomach.  The tube was flushed with saline.  The tube was left open to gravity bag drainage for 24 hours.  The patient will be scheduled to return to the department for a tube check in 24 hours to verify a appropriate placement prior to use.  Sterile gauze dressing was applied.  There are no immediate complications.  The patient left the department in stable condition.  EBL: Less than 5 mL.     Successful fluoroscopic guided percutaneous gastrostomy tube placement.  An 18 [place] gastrostomy tube was placed.       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  Toxicity check performed.  Tolerating treatment with manageable side effects.  Status post feeding tube but not using it much.  Seen by dietitian.  Encourage patient to supplement nutrition with the feeding tube.  Labs adequate for treatment  Proceed with cisplatin.  Continue monitor kidney function closely  Patient counseled tobacco cessation  Continue Magic mouthwash  Encourage patient to increase oral nutritional intake.  Patient counseled cutting back on alcohol consumption  Case discussed with head and neck cancer navigator  NCCN guidelines were reviewed and discussed with the patient.  The diagnosis and care plan were discussed with the patient in detail. I discussed the natural history of the disease, prognosis, risks and goals of therapy and answered all the patients questions to the best of my ability.  Patient expressed understanding

## 2025-05-09 NOTE — TELEPHONE ENCOUNTER
Name: Matt Mao  : 1960  MRN: [id number]    Oncology Navigation Follow-Up Note    Contact Type:  Medical Oncology    Notes: Pt @ [facility] for Dr. Mota f/u & tx.  Met w/pt in infusion cubicle.  Pt denied questions/concerns.  Encouraged to contact writer prn.  Will continue to follow.      Electronically signed by Ember Dillon RN on 2025 at 11:51 AM

## 2025-05-09 NOTE — TELEPHONE ENCOUNTER
Instructions   from Dr. Chin Mota MD    Tx today   Rv in 1 week tx         Tx today  RV 5/16/25 during tx at 11:00 am

## 2025-05-12 ENCOUNTER — HOSPITAL ENCOUNTER (OUTPATIENT)
Dept: INFUSION THERAPY | Age: 65
Discharge: HOME | End: 2025-05-12
Payer: MEDICARE

## 2025-05-12 ENCOUNTER — HOSPITAL ENCOUNTER (OUTPATIENT)
Dept: RADIATION ONCOLOGY | Age: 65
Discharge: HOME | End: 2025-05-12
Payer: MEDICARE

## 2025-05-12 VITALS
RESPIRATION RATE: 16 BRPM | TEMPERATURE: 97.3 F | DIASTOLIC BLOOD PRESSURE: 76 MMHG | SYSTOLIC BLOOD PRESSURE: 126 MMHG | HEART RATE: 98 BPM

## 2025-05-12 DIAGNOSIS — C32.1 MALIGNANT NEOPLASM OF SUPRAGLOTTIS (HCC): Primary | ICD-10-CM

## 2025-05-12 PROCEDURE — 96360 HYDRATION IV INFUSION INIT: CPT

## 2025-05-12 PROCEDURE — 2500000003 HC RX 250 WO HCPCS: Performed by: RADIOLOGY

## 2025-05-12 PROCEDURE — 6360000002 HC RX W HCPCS: Performed by: RADIOLOGY

## 2025-05-12 PROCEDURE — 2580000003 HC RX 258: Performed by: INTERNAL MEDICINE

## 2025-05-12 PROCEDURE — 77386 HC NTSTY MODUL RAD TX DLVR CPLX: CPT | Performed by: RADIOLOGY

## 2025-05-12 RX ORDER — 0.9 % SODIUM CHLORIDE 0.9 %
1000 INTRAVENOUS SOLUTION INTRAVENOUS ONCE
Status: CANCELLED
Start: 2025-05-19 | End: 2025-05-19

## 2025-05-12 RX ORDER — HYDROCORTISONE SODIUM SUCCINATE 100 MG/2ML
100 INJECTION INTRAMUSCULAR; INTRAVENOUS
Status: CANCELLED | OUTPATIENT
Start: 2025-05-19

## 2025-05-12 RX ORDER — HEPARIN 100 UNIT/ML
500 SYRINGE INTRAVENOUS PRN
Status: DISCONTINUED | OUTPATIENT
Start: 2025-05-12 | End: 2025-05-13 | Stop reason: HOSPADM

## 2025-05-12 RX ORDER — SODIUM CHLORIDE 0.9 % (FLUSH) 0.9 %
5-40 SYRINGE (ML) INJECTION PRN
Status: CANCELLED | OUTPATIENT
Start: 2025-05-19

## 2025-05-12 RX ORDER — SODIUM CHLORIDE 9 MG/ML
INJECTION, SOLUTION INTRAVENOUS CONTINUOUS
Status: CANCELLED | OUTPATIENT
Start: 2025-05-19

## 2025-05-12 RX ORDER — 0.9 % SODIUM CHLORIDE 0.9 %
1000 INTRAVENOUS SOLUTION INTRAVENOUS ONCE
Status: COMPLETED | OUTPATIENT
Start: 2025-05-12 | End: 2025-05-12

## 2025-05-12 RX ORDER — HEPARIN 100 UNIT/ML
500 SYRINGE INTRAVENOUS PRN
Status: CANCELLED | OUTPATIENT
Start: 2025-05-19

## 2025-05-12 RX ORDER — DIPHENHYDRAMINE HYDROCHLORIDE 50 MG/ML
50 INJECTION, SOLUTION INTRAMUSCULAR; INTRAVENOUS
Status: CANCELLED | OUTPATIENT
Start: 2025-05-19

## 2025-05-12 RX ORDER — SODIUM CHLORIDE 0.9 % (FLUSH) 0.9 %
5-40 SYRINGE (ML) INJECTION PRN
Status: DISCONTINUED | OUTPATIENT
Start: 2025-05-12 | End: 2025-05-13 | Stop reason: HOSPADM

## 2025-05-12 RX ORDER — ACETAMINOPHEN 325 MG/1
650 TABLET ORAL
Status: CANCELLED | OUTPATIENT
Start: 2025-05-19

## 2025-05-12 RX ORDER — ONDANSETRON 2 MG/ML
8 INJECTION INTRAMUSCULAR; INTRAVENOUS
Status: CANCELLED | OUTPATIENT
Start: 2025-05-19

## 2025-05-12 RX ORDER — FAMOTIDINE 10 MG/ML
20 INJECTION, SOLUTION INTRAVENOUS
Status: CANCELLED | OUTPATIENT
Start: 2025-05-19

## 2025-05-12 RX ORDER — SODIUM CHLORIDE 9 MG/ML
5-250 INJECTION, SOLUTION INTRAVENOUS PRN
Status: CANCELLED | OUTPATIENT
Start: 2025-05-19

## 2025-05-12 RX ORDER — ALBUTEROL SULFATE 90 UG/1
4 INHALANT RESPIRATORY (INHALATION) PRN
Status: CANCELLED | OUTPATIENT
Start: 2025-05-19

## 2025-05-12 RX ORDER — EPINEPHRINE 1 MG/ML
0.3 INJECTION, SOLUTION, CONCENTRATE INTRAVENOUS PRN
Status: CANCELLED | OUTPATIENT
Start: 2025-05-19

## 2025-05-12 RX ADMIN — HEPARIN 500 UNITS: 100 SYRINGE at 10:53

## 2025-05-12 RX ADMIN — SODIUM CHLORIDE 1000 ML: 9 INJECTION, SOLUTION INTRAVENOUS at 09:27

## 2025-05-12 RX ADMIN — SODIUM CHLORIDE, PRESERVATIVE FREE 10 ML: 5 INJECTION INTRAVENOUS at 10:53

## 2025-05-13 ENCOUNTER — HOSPITAL ENCOUNTER (OUTPATIENT)
Dept: RADIATION ONCOLOGY | Age: 65
Discharge: HOME | End: 2025-05-13
Payer: MEDICARE

## 2025-05-13 ENCOUNTER — TELEPHONE (OUTPATIENT)
Age: 65
End: 2025-05-13

## 2025-05-13 PROCEDURE — 77386 HC NTSTY MODUL RAD TX DLVR CPLX: CPT | Performed by: RADIOLOGY

## 2025-05-13 NOTE — TELEPHONE ENCOUNTER
set up transportation for next week's treatment through insurance provider (5/19-23).     Transportation details:  [employer] [phone]   9:00am 10:30am 10:30am 10:30am 10:00am  Confirmation [id number]  Call for return ride (5/19, 5/23), return ride at 12:15pm

## 2025-05-14 ENCOUNTER — HOSPITAL ENCOUNTER (OUTPATIENT)
Dept: PHYSICAL THERAPY | Facility: CLINIC | Age: 65
Setting detail: THERAPIES SERIES
Discharge: HOME | End: 2025-05-14
Payer: MEDICARE

## 2025-05-14 ENCOUNTER — CLINICAL DOCUMENTATION (OUTPATIENT)
Age: 65
End: 2025-05-14

## 2025-05-14 ENCOUNTER — HOSPITAL ENCOUNTER (OUTPATIENT)
Dept: RADIATION ONCOLOGY | Age: 65
Discharge: HOME | End: 2025-05-14
Payer: MEDICARE

## 2025-05-14 VITALS
RESPIRATION RATE: 16 BRPM | BODY MASS INDEX: 21.08 KG/M2 | SYSTOLIC BLOOD PRESSURE: 117 MMHG | HEART RATE: 78 BPM | TEMPERATURE: 98.2 F | DIASTOLIC BLOOD PRESSURE: 63 MMHG | WEIGHT: 130.6 LBS | OXYGEN SATURATION: 99 %

## 2025-05-14 PROCEDURE — 97110 THERAPEUTIC EXERCISES: CPT

## 2025-05-14 PROCEDURE — 97140 MANUAL THERAPY 1/> REGIONS: CPT

## 2025-05-14 PROCEDURE — 77386 HC NTSTY MODUL RAD TX DLVR CPLX: CPT | Performed by: RADIOLOGY

## 2025-05-14 PROCEDURE — 77336 RADIATION PHYSICS CONSULT: CPT | Performed by: RADIOLOGY

## 2025-05-14 NOTE — PROGRESS NOTES
Matt Mao  5/14/2025  Wt Readings from Last 3 Encounters:   05/14/25 59.2 kg (130 lb 9.6 oz)   05/09/25 60.8 kg (134 lb)   05/07/25 59.2 kg (130 lb 9.6 oz)     Body mass index is 21.08 kg/m².        Treatment Area:Larynx (with chemo)    Patient was seen today for weekly visit.     Comfort Alteration  Fatigue: Mild    Mucous Membrane Alteration  Mucositis Due to Radiation: No  Thrush: No  Voice Changes:Yes- raspy    Nutritional Alteration-No tube  Anorexia: Yes  Nausea: No  Vomiting: No     Ventilation Alteration  Cough:Occasional    Skin Alteration   Sensation:intact    Radiation Dermatitis:  Intact [x]     Erythema  []     Discoloration  [x]    Rash []     Dry desquamation  []     Moist desquamation []       Emotional  Coping: effective      Injury, potential bleeding or infection: skin care BID reinforced    Lab Results   Component Value Date    WBC 3.9 05/09/2025    HGB 9.4 (L) 05/09/2025    HCT 28.5 (L) 05/09/2025     05/09/2025         /63   Pulse 78   Temp 98.2 °F (36.8 °C) (Temporal)   Resp 16   Wt 59.2 kg (130 lb 9.6 oz)   SpO2 99%   BMI 21.08 kg/m²      Pain Assessment: None - Denies Pain            Assessment/Plan: Patient was seen today for weekly visit. He arrives ambulatory with steady gait.  No oral thrush noted.  Weight stable. He continues to eat a variety of foods.  Patient reports minimal pain with swallowing. He reports flushing PEG tube as directed.  PEG tube dressing changed per writer.  Site intact without redness or drainage. Dr. Finnegan evaluated patient.  Continue plan of care.     Elsie Pereira RN  
Well; For Oral Use. Lidocaine Viscous 2%; 80mL, Diphenhydramine 12.5MG/5Ml; 80mL, ALUM & MAG HYDROXIDE-SIMETH 200-200-20 MG/5ML; 80mL., Disp: 473 mL, Rfl: 1    ibuprofen (IBU) 400 MG tablet, Take 1 tablet by mouth every 6 hours as needed for Pain (1-2 tabs (400-800mg) to alternate with Tylenol so that you are taking something for pain every 3 hours) (Patient not taking: Reported on 5/2/2025), Disp: 120 tablet, Rfl: 1    acetaminophen (TYLENOL) 325 MG tablet, Take 2 tablets by mouth every 6 hours as needed for Pain Alternate with Motrin so that you are taking something for pain every 3 hours (Patient not taking: Reported on 5/2/2025), Disp: 120 tablet, Rfl: 1    cyclobenzaprine (FLEXERIL) 10 MG tablet, Take 1 tablet by mouth 3 times daily as needed for Muscle spasms (Patient not taking: Reported on 5/2/2025), Disp: , Rfl:     sildenafil (VIAGRA) 100 MG tablet, Take 1 tablet by mouth as needed for Erectile Dysfunction (Patient not taking: Reported on 5/2/2025), Disp: , Rfl:     amLODIPine (NORVASC) 5 MG tablet, Take 2 tablets by mouth daily, Disp: , Rfl:     lisinopril (PRINIVIL;ZESTRIL) 5 MG tablet, Take 1 tablet by mouth daily, Disp: , Rfl:     Magic Mouthwash (MIRACLE MOUTHWASH), Swish and swallow 5 mLs 3 times daily as needed for Pain Please refer to pharmacist recommendations for concentrations of above additives.  I defer this decision to the practicing pharmacist, Disp: 473 mL, Rfl: 1    aspirin (DEIRDRE ASPIRIN) 325 MG tablet, Patient states takes a full 325 mg ASA, Disp: , Rfl:     Emollient (CERAVE) CREA, , Disp: , Rfl:     EQ STOMACH RELIEF 262 MG/15ML suspension, , Disp: , Rfl: 0    clopidogrel (PLAVIX) 75 MG tablet, , Disp: , Rfl:     hydrOXYzine (ATARAX) 25 MG tablet, , Disp: , Rfl: 3    ipratropium (ATROVENT HFA) 17 MCG/ACT inhaler, , Disp: , Rfl:     levofloxacin (LEVAQUIN) 500 MG tablet, , Disp: , Rfl:     metroNIDAZOLE (FLAGYL) 500 MG tablet, , Disp: , Rfl: 0    naproxen (NAPROSYN) 500 MG tablet, ,

## 2025-05-14 NOTE — FLOWSHEET NOTE
very tender upon the neck, L>R and with increased tension. Sig wincing throughout due to abdomen and throat pain with swallowing. Encouraged use of pain meds to control symptoms, states he does not like to take pills. Pt's transportation arrived early-shortened treatment time today. Strongly emphasized HEP, upright posture, moisturizing and eating/drinking. Plan to cont with 1x/week for manual and ex as tolerated.     [] No change.     [] Other:  [x] Patient would benefit from physical therapy in order to promote optimal healing and to reduce muscular/fascial restriction, improve body posture to decrease strain to surrounding tissue, restore cervical flexibility and postural strength for improvement in function and in quality of life       STG: (to be met in 12 treatments)              1. ? Pain: Stabilize cervical and throat pain and ensure optimal management of pain during all ADLs ( home, occupation, community and recreation)              2. ? ROM: Able to sustain cervical and B shoulder AROM while receiving radiation treatments               3. ? Strength: Incr R UE to grossly 5/5 and demonstrate good scapular/postural stability              4. ? Function: Pt to report improved sleep, maintain ability to complete ADL's, lift/carry and complete household chores w/o difficulty    5.  Independent with Home Exercise Program program as demonstrated by performance with correct form without cues.         LTG: (to be met in 18 treatments)        1. Decr BFI score by 1+ for improved erma to activity and decr UW-QOL score by 20% for overall improved function        2. Optimize cervical and bilateral shoulder AROM for functional activity        2. Improve cervical soft tissue extensibility to allow for more normal motion and function        3. Continue activity to decrease risk factors associated with increased time being sedentary while undergoing chemotherapy, radiation, surgery.        4. Manage/mitigate side

## 2025-05-14 NOTE — PROGRESS NOTES
[facility]- MEDICAL NUTRITION THERAPY     Visit Type: Follow-up     NUTRITION RECOMMENDATIONS / MONITORING / EVALUATION  Continue diet as tolerated.   Use oral nutrition supplements as needed.   Will continue to monitor PO tolerance, adequacy of intake, weight, and care plans.     Subjective/Current Data:  Matt Mao is a 64 y.o. 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   Pt reports he is still tolerating diet ok and that appetite is good. States he ate 3 steak tacos, rice, and beans for dinner and cookies and milk for a snack before bed. Flushing PEG with water several times/day. Weight is stable.     Objective Data:  Patient Active Problem List    Diagnosis Date Noted    Malignant neoplasm of supraglottis (HCC) 03/13/2025    Laryngeal mass 02/21/2025     Anthropometric Measures:  Height: 5' 6\"  Weight: 130 lb 9.6 oz (59.2 kg)  Ideal Body Weight: 142 lb (64.5 kg)  BMI: 21.08 kg/m2 (Normal Weight)    Wt Readings from Last 20 Encounters:   05/14/25 59.2 kg (130 lb 9.6 oz)   05/09/25 60.8 kg (134 lb)   05/07/25 59.2 kg (130 lb 9.6 oz)   05/05/25 59.4 kg (131 lb)   05/02/25 59.4 kg (131 lb)   04/30/25 61.1 kg (134 lb 9.6 oz)   04/25/25 61.2 kg (135 lb)   04/23/25 58.9 kg (129 lb 12.8 oz)   04/23/25 58.9 kg (129 lb 12.8 oz)   04/18/25 62.3 kg (137 lb 6.4 oz)   04/16/25 63 kg (138 lb 12.8 oz)   04/11/25 62.5 kg (137 lb 12.8 oz)   04/07/25 62.8 kg (138 lb 6.4 oz)   03/07/25 61.3 kg (135 lb 3.2 oz)   03/07/25 61.3 kg (135 lb 3.2 oz)   02/28/25 61.7 kg (136 lb)   02/21/25 61.1 kg (134 lb 12.8 oz)     Estimated Nutrition Needs:  Estimated Calorie Needs: 1800 kcal/day   Estimated Protein Needs: 70-90 g pro/day   Estimated Fluid Needs: 1800 mL/day      Current Nutrition Regimen:  Oral Diet: Diet as tolerated  Oral Supplement: Boost as needed     Nutrition Diagnosis and Goal  Problem: Increased nutrient needs  Etiology/related to: squamous

## 2025-05-15 ENCOUNTER — TELEPHONE (OUTPATIENT)
Age: 65
End: 2025-05-15

## 2025-05-15 ENCOUNTER — HOSPITAL ENCOUNTER (OUTPATIENT)
Dept: RADIATION ONCOLOGY | Age: 65
Discharge: HOME | End: 2025-05-15
Payer: MEDICARE

## 2025-05-15 PROCEDURE — 77386 HC NTSTY MODUL RAD TX DLVR CPLX: CPT | Performed by: RADIOLOGY

## 2025-05-16 ENCOUNTER — TELEPHONE (OUTPATIENT)
Age: 65
End: 2025-05-16

## 2025-05-16 ENCOUNTER — OFFICE VISIT (OUTPATIENT)
Age: 65
End: 2025-05-16

## 2025-05-16 ENCOUNTER — HOSPITAL ENCOUNTER (OUTPATIENT)
Dept: RADIATION ONCOLOGY | Age: 65
Discharge: HOME | End: 2025-05-16
Payer: MEDICARE

## 2025-05-16 ENCOUNTER — HOSPITAL ENCOUNTER (OUTPATIENT)
Dept: INFUSION THERAPY | Age: 65
Discharge: HOME | End: 2025-05-16
Payer: MEDICARE

## 2025-05-16 VITALS
WEIGHT: 132.2 LBS | BODY MASS INDEX: 21.34 KG/M2 | TEMPERATURE: 97.7 F | OXYGEN SATURATION: 97 % | SYSTOLIC BLOOD PRESSURE: 118 MMHG | HEART RATE: 73 BPM | DIASTOLIC BLOOD PRESSURE: 50 MMHG | RESPIRATION RATE: 18 BRPM

## 2025-05-16 DIAGNOSIS — Z51.11 CHEMOTHERAPY MANAGEMENT, ENCOUNTER FOR: ICD-10-CM

## 2025-05-16 DIAGNOSIS — C32.1 MALIGNANT NEOPLASM OF SUPRAGLOTTIS (HCC): Primary | ICD-10-CM

## 2025-05-16 DIAGNOSIS — D70.1 CHEMOTHERAPY INDUCED NEUTROPENIA: ICD-10-CM

## 2025-05-16 DIAGNOSIS — T45.1X5A CHEMOTHERAPY INDUCED NEUTROPENIA: ICD-10-CM

## 2025-05-16 LAB
ALBUMIN SERPL-MCNC: 3.6 G/DL (ref 3.5–5.2)
ALBUMIN/GLOB SERPL: 1.4 {RATIO} (ref 1–2.5)
ALP SERPL-CCNC: 76 U/L (ref 40–129)
ALT SERPL-CCNC: 14 U/L (ref 10–50)
ANION GAP SERPL CALCULATED.3IONS-SCNC: 12 MMOL/L (ref 9–16)
AST SERPL-CCNC: 22 U/L (ref 10–50)
BASOPHILS # BLD: 0 K/UL (ref 0–0.2)
BASOPHILS NFR BLD: 0 % (ref 0–2)
BILIRUB SERPL-MCNC: <0.2 MG/DL (ref 0–1.2)
BUN SERPL-MCNC: 23 MG/DL (ref 8–23)
CALCIUM SERPL-MCNC: 8.2 MG/DL (ref 8.6–10.4)
CHLORIDE SERPL-SCNC: 99 MMOL/L (ref 98–107)
CO2 SERPL-SCNC: 25 MMOL/L (ref 20–31)
CREAT SERPL-MCNC: 1.2 MG/DL (ref 0.7–1.2)
EOSINOPHIL # BLD: 0.02 K/UL (ref 0–0.4)
EOSINOPHILS RELATIVE PERCENT: 1 % (ref 1–4)
ERYTHROCYTE [DISTWIDTH] IN BLOOD BY AUTOMATED COUNT: 13.3 % (ref 12.5–15.4)
GFR, ESTIMATED: 67 ML/MIN/1.73M2
GLUCOSE SERPL-MCNC: 115 MG/DL (ref 74–99)
HCT VFR BLD AUTO: 25.6 % (ref 41–53)
HGB BLD-MCNC: 8.5 G/DL (ref 13.5–17.5)
LYMPHOCYTES NFR BLD: 0.48 K/UL (ref 1–4.8)
LYMPHOCYTES RELATIVE PERCENT: 20 % (ref 24–44)
MAGNESIUM SERPL-MCNC: 1.2 MG/DL (ref 1.6–2.4)
MCH RBC QN AUTO: 30.6 PG (ref 26–34)
MCHC RBC AUTO-ENTMCNC: 33.2 G/DL (ref 31–37)
MCV RBC AUTO: 92.2 FL (ref 80–100)
MONOCYTES NFR BLD: 0.17 K/UL (ref 0.1–0.8)
MONOCYTES NFR BLD: 7 % (ref 1–7)
MORPHOLOGY: NORMAL
NEUTROPHILS NFR BLD: 72 % (ref 36–66)
NEUTS SEG NFR BLD: 1.73 K/UL (ref 1.8–7.7)
PHOSPHATE SERPL-MCNC: 3.2 MG/DL (ref 2.5–4.5)
PLATELET # BLD AUTO: 149 K/UL (ref 140–450)
PMV BLD AUTO: 8.6 FL (ref 6–12)
POTASSIUM SERPL-SCNC: 4.2 MMOL/L (ref 3.7–5.3)
PROT SERPL-MCNC: 6.1 G/DL (ref 6.6–8.7)
RBC # BLD AUTO: 2.78 M/UL (ref 4.5–5.9)
SODIUM SERPL-SCNC: 136 MMOL/L (ref 136–145)
WBC OTHER # BLD: 2.4 K/UL (ref 3.5–11)

## 2025-05-16 PROCEDURE — 96368 THER/DIAG CONCURRENT INF: CPT

## 2025-05-16 PROCEDURE — 96375 TX/PRO/DX INJ NEW DRUG ADDON: CPT

## 2025-05-16 PROCEDURE — 80053 COMPREHEN METABOLIC PANEL: CPT

## 2025-05-16 PROCEDURE — 83735 ASSAY OF MAGNESIUM: CPT

## 2025-05-16 PROCEDURE — 2580000003 HC RX 258: Performed by: INTERNAL MEDICINE

## 2025-05-16 PROCEDURE — 96367 TX/PROPH/DG ADDL SEQ IV INF: CPT

## 2025-05-16 PROCEDURE — 85025 COMPLETE CBC W/AUTO DIFF WBC: CPT

## 2025-05-16 PROCEDURE — 2500000003 HC RX 250 WO HCPCS: Performed by: INTERNAL MEDICINE

## 2025-05-16 PROCEDURE — 96365 THER/PROPH/DIAG IV INF INIT: CPT

## 2025-05-16 PROCEDURE — 84100 ASSAY OF PHOSPHORUS: CPT

## 2025-05-16 PROCEDURE — 77386 HC NTSTY MODUL RAD TX DLVR CPLX: CPT | Performed by: RADIOLOGY

## 2025-05-16 PROCEDURE — 6360000002 HC RX W HCPCS: Performed by: INTERNAL MEDICINE

## 2025-05-16 PROCEDURE — 96366 THER/PROPH/DIAG IV INF ADDON: CPT

## 2025-05-16 PROCEDURE — 96413 CHEMO IV INFUSION 1 HR: CPT

## 2025-05-16 RX ORDER — SODIUM CHLORIDE 0.9 % (FLUSH) 0.9 %
5-40 SYRINGE (ML) INJECTION PRN
Status: DISCONTINUED | OUTPATIENT
Start: 2025-05-16 | End: 2025-05-17 | Stop reason: HOSPADM

## 2025-05-16 RX ORDER — DEXAMETHASONE SODIUM PHOSPHATE 10 MG/ML
10 INJECTION, SOLUTION INTRAMUSCULAR; INTRAVENOUS ONCE
Status: COMPLETED | OUTPATIENT
Start: 2025-05-16 | End: 2025-05-16

## 2025-05-16 RX ORDER — HEPARIN SODIUM (PORCINE) LOCK FLUSH IV SOLN 100 UNIT/ML 100 UNIT/ML
500 SOLUTION INTRAVENOUS PRN
Status: CANCELLED | OUTPATIENT
Start: 2025-05-23

## 2025-05-16 RX ORDER — SODIUM CHLORIDE 9 MG/ML
5-250 INJECTION, SOLUTION INTRAVENOUS PRN
Status: DISCONTINUED | OUTPATIENT
Start: 2025-05-16 | End: 2025-05-17 | Stop reason: HOSPADM

## 2025-05-16 RX ORDER — PALONOSETRON 0.05 MG/ML
0.25 INJECTION, SOLUTION INTRAVENOUS ONCE
Status: COMPLETED | OUTPATIENT
Start: 2025-05-16 | End: 2025-05-16

## 2025-05-16 RX ORDER — SODIUM CHLORIDE 9 MG/ML
5-250 INJECTION, SOLUTION INTRAVENOUS PRN
OUTPATIENT
Start: 2025-05-30

## 2025-05-16 RX ORDER — SODIUM CHLORIDE 9 MG/ML
INJECTION, SOLUTION INTRAVENOUS CONTINUOUS
OUTPATIENT
Start: 2025-05-30

## 2025-05-16 RX ORDER — PANTOPRAZOLE SODIUM 40 MG/1
40 TABLET, DELAYED RELEASE ORAL DAILY
Qty: 30 TABLET | Refills: 1 | Status: SHIPPED | OUTPATIENT
Start: 2025-05-16

## 2025-05-16 RX ORDER — MAGNESIUM SULFATE 1 G/100ML
1000 INJECTION INTRAVENOUS ONCE
Status: COMPLETED | OUTPATIENT
Start: 2025-05-16 | End: 2025-05-16

## 2025-05-16 RX ORDER — HEPARIN 100 UNIT/ML
500 SYRINGE INTRAVENOUS PRN
Status: DISCONTINUED | OUTPATIENT
Start: 2025-05-16 | End: 2025-05-17 | Stop reason: HOSPADM

## 2025-05-16 RX ORDER — SODIUM CHLORIDE 0.9 % (FLUSH) 0.9 %
5-40 SYRINGE (ML) INJECTION PRN
Status: CANCELLED | OUTPATIENT
Start: 2025-05-23

## 2025-05-16 RX ORDER — PALONOSETRON 0.05 MG/ML
0.25 INJECTION, SOLUTION INTRAVENOUS ONCE
OUTPATIENT
Start: 2025-05-30 | End: 2025-05-23

## 2025-05-16 RX ORDER — MEPERIDINE HYDROCHLORIDE 50 MG/ML
12.5 INJECTION INTRAMUSCULAR; INTRAVENOUS; SUBCUTANEOUS PRN
OUTPATIENT
Start: 2025-05-30

## 2025-05-16 RX ORDER — FAMOTIDINE 10 MG/ML
20 INJECTION, SOLUTION INTRAVENOUS
OUTPATIENT
Start: 2025-05-30

## 2025-05-16 RX ORDER — DIPHENHYDRAMINE HYDROCHLORIDE 50 MG/ML
50 INJECTION, SOLUTION INTRAMUSCULAR; INTRAVENOUS
OUTPATIENT
Start: 2025-05-30

## 2025-05-16 RX ORDER — SODIUM CHLORIDE AND POTASSIUM CHLORIDE 150; 900 MG/100ML; MG/100ML
INJECTION, SOLUTION INTRAVENOUS CONTINUOUS
Status: DISCONTINUED | OUTPATIENT
Start: 2025-05-16 | End: 2025-05-16 | Stop reason: HOSPADM

## 2025-05-16 RX ORDER — HYDROCODONE BITARTRATE AND ACETAMINOPHEN 7.5; 325 MG/1; MG/1
1 TABLET ORAL 2 TIMES DAILY
Qty: 14 TABLET | Refills: 0 | Status: SHIPPED | OUTPATIENT
Start: 2025-05-16 | End: 2025-05-23

## 2025-05-16 RX ORDER — ACETAMINOPHEN 325 MG/1
650 TABLET ORAL
OUTPATIENT
Start: 2025-05-30

## 2025-05-16 RX ORDER — HYDROCORTISONE SODIUM SUCCINATE 100 MG/2ML
100 INJECTION INTRAMUSCULAR; INTRAVENOUS
OUTPATIENT
Start: 2025-05-30

## 2025-05-16 RX ORDER — MAGNESIUM SULFATE IN WATER 40 MG/ML
2000 INJECTION, SOLUTION INTRAVENOUS ONCE
Status: COMPLETED | OUTPATIENT
Start: 2025-05-16 | End: 2025-05-16

## 2025-05-16 RX ORDER — ONDANSETRON 2 MG/ML
8 INJECTION INTRAMUSCULAR; INTRAVENOUS
OUTPATIENT
Start: 2025-05-30

## 2025-05-16 RX ORDER — ALBUTEROL SULFATE 90 UG/1
4 INHALANT RESPIRATORY (INHALATION) PRN
OUTPATIENT
Start: 2025-05-30

## 2025-05-16 RX ORDER — SUCRALFATE 1 G/1
1 TABLET ORAL 4 TIMES DAILY
Qty: 120 TABLET | Refills: 3 | Status: SHIPPED | OUTPATIENT
Start: 2025-05-16

## 2025-05-16 RX ORDER — PROCHLORPERAZINE EDISYLATE 5 MG/ML
5 INJECTION INTRAMUSCULAR; INTRAVENOUS
OUTPATIENT
Start: 2025-05-30

## 2025-05-16 RX ORDER — EPINEPHRINE 1 MG/ML
0.3 INJECTION, SOLUTION, CONCENTRATE INTRAVENOUS PRN
OUTPATIENT
Start: 2025-05-30

## 2025-05-16 RX ORDER — SODIUM CHLORIDE 9 MG/ML
5-250 INJECTION, SOLUTION INTRAVENOUS PRN
Status: CANCELLED | OUTPATIENT
Start: 2025-05-23

## 2025-05-16 RX ADMIN — CISPLATIN 68 MG: 100 INJECTION, SOLUTION INTRAVENOUS at 14:09

## 2025-05-16 RX ADMIN — SODIUM CHLORIDE, PRESERVATIVE FREE 10 ML: 5 INJECTION INTRAVENOUS at 10:40

## 2025-05-16 RX ADMIN — HEPARIN 500 UNITS: 100 SYRINGE at 16:45

## 2025-05-16 RX ADMIN — POTASSIUM CHLORIDE AND SODIUM CHLORIDE: 900; 150 INJECTION, SOLUTION INTRAVENOUS at 10:59

## 2025-05-16 RX ADMIN — MAGNESIUM SULFATE HEPTAHYDRATE 1000 MG: 1 INJECTION, SOLUTION INTRAVENOUS at 15:44

## 2025-05-16 RX ADMIN — SODIUM CHLORIDE 150 MG: 0.9 INJECTION, SOLUTION INTRAVENOUS at 13:10

## 2025-05-16 RX ADMIN — SODIUM CHLORIDE 150 ML/HR: 0.9 INJECTION, SOLUTION INTRAVENOUS at 10:56

## 2025-05-16 RX ADMIN — DEXAMETHASONE SODIUM PHOSPHATE 10 MG: 10 INJECTION, SOLUTION INTRAMUSCULAR; INTRAVENOUS at 12:59

## 2025-05-16 RX ADMIN — MAGNESIUM SULFATE HEPTAHYDRATE 2000 MG: 40 INJECTION, SOLUTION INTRAVENOUS at 13:42

## 2025-05-16 RX ADMIN — PALONOSETRON 0.25 MG: 0.05 INJECTION, SOLUTION INTRAVENOUS at 12:59

## 2025-05-16 RX ADMIN — MAGNESIUM SULFATE HEPTAHYDRATE 1000 MG: 1 INJECTION, SOLUTION INTRAVENOUS at 10:58

## 2025-05-16 RX ADMIN — SODIUM CHLORIDE, PRESERVATIVE FREE 10 ML: 5 INJECTION INTRAVENOUS at 16:45

## 2025-05-16 NOTE — PROGRESS NOTES
Pt here for C1D36.  Arrives ambulatory.  Denies any new complaints.  Labs drawn from port, results reviewed.  Mg 1.2; replaced mg per protocol- 2g  Pt was seen by Dr. Mota, order rec'd to proceed with tx.  Tx complete without incident.  Pt d/c'd in stable condition.  Returns 5/19/25 for hydration.

## 2025-05-16 NOTE — TELEPHONE ENCOUNTER
met with patient during infusion. Patient played voicemail. [facility]  called and updated them on scheduled last day of radiation.

## 2025-05-19 ENCOUNTER — HOSPITAL ENCOUNTER (OUTPATIENT)
Dept: INFUSION THERAPY | Age: 65
Discharge: HOME | End: 2025-05-19
Payer: MEDICARE

## 2025-05-19 ENCOUNTER — HOSPITAL ENCOUNTER (OUTPATIENT)
Dept: RADIATION ONCOLOGY | Age: 65
Discharge: HOME | End: 2025-05-19
Payer: MEDICARE

## 2025-05-19 VITALS
HEART RATE: 87 BPM | SYSTOLIC BLOOD PRESSURE: 114 MMHG | TEMPERATURE: 98 F | DIASTOLIC BLOOD PRESSURE: 70 MMHG | RESPIRATION RATE: 16 BRPM

## 2025-05-19 DIAGNOSIS — C32.1 MALIGNANT NEOPLASM OF SUPRAGLOTTIS (HCC): Primary | ICD-10-CM

## 2025-05-19 PROCEDURE — 2500000003 HC RX 250 WO HCPCS: Performed by: RADIOLOGY

## 2025-05-19 PROCEDURE — 96360 HYDRATION IV INFUSION INIT: CPT

## 2025-05-19 PROCEDURE — 2580000003 HC RX 258: Performed by: INTERNAL MEDICINE

## 2025-05-19 PROCEDURE — 6360000002 HC RX W HCPCS: Performed by: RADIOLOGY

## 2025-05-19 PROCEDURE — 77386 HC NTSTY MODUL RAD TX DLVR CPLX: CPT | Performed by: RADIOLOGY

## 2025-05-19 PROCEDURE — 96361 HYDRATE IV INFUSION ADD-ON: CPT

## 2025-05-19 RX ORDER — ONDANSETRON 2 MG/ML
8 INJECTION INTRAMUSCULAR; INTRAVENOUS
OUTPATIENT
Start: 2025-05-26

## 2025-05-19 RX ORDER — HYDROCORTISONE SODIUM SUCCINATE 100 MG/2ML
100 INJECTION INTRAMUSCULAR; INTRAVENOUS
OUTPATIENT
Start: 2025-05-26

## 2025-05-19 RX ORDER — 0.9 % SODIUM CHLORIDE 0.9 %
1000 INTRAVENOUS SOLUTION INTRAVENOUS ONCE
Status: COMPLETED | OUTPATIENT
Start: 2025-05-19 | End: 2025-05-19

## 2025-05-19 RX ORDER — DIPHENHYDRAMINE HYDROCHLORIDE 50 MG/ML
50 INJECTION, SOLUTION INTRAMUSCULAR; INTRAVENOUS
OUTPATIENT
Start: 2025-05-26

## 2025-05-19 RX ORDER — ACETAMINOPHEN 325 MG/1
650 TABLET ORAL
OUTPATIENT
Start: 2025-05-26

## 2025-05-19 RX ORDER — SODIUM CHLORIDE 0.9 % (FLUSH) 0.9 %
5-40 SYRINGE (ML) INJECTION PRN
OUTPATIENT
Start: 2025-05-26

## 2025-05-19 RX ORDER — EPINEPHRINE 1 MG/ML
0.3 INJECTION, SOLUTION, CONCENTRATE INTRAVENOUS PRN
OUTPATIENT
Start: 2025-05-26

## 2025-05-19 RX ORDER — HEPARIN 100 UNIT/ML
500 SYRINGE INTRAVENOUS PRN
OUTPATIENT
Start: 2025-05-26

## 2025-05-19 RX ORDER — HEPARIN 100 UNIT/ML
500 SYRINGE INTRAVENOUS PRN
Status: DISCONTINUED | OUTPATIENT
Start: 2025-05-19 | End: 2025-05-20 | Stop reason: HOSPADM

## 2025-05-19 RX ORDER — FAMOTIDINE 10 MG/ML
20 INJECTION, SOLUTION INTRAVENOUS
OUTPATIENT
Start: 2025-05-26

## 2025-05-19 RX ORDER — SODIUM CHLORIDE 9 MG/ML
5-250 INJECTION, SOLUTION INTRAVENOUS PRN
OUTPATIENT
Start: 2025-05-26

## 2025-05-19 RX ORDER — 0.9 % SODIUM CHLORIDE 0.9 %
1000 INTRAVENOUS SOLUTION INTRAVENOUS ONCE
Start: 2025-05-26 | End: 2025-05-26

## 2025-05-19 RX ORDER — SODIUM CHLORIDE 9 MG/ML
INJECTION, SOLUTION INTRAVENOUS CONTINUOUS
OUTPATIENT
Start: 2025-05-26

## 2025-05-19 RX ORDER — SODIUM CHLORIDE 0.9 % (FLUSH) 0.9 %
5-40 SYRINGE (ML) INJECTION PRN
Status: DISCONTINUED | OUTPATIENT
Start: 2025-05-19 | End: 2025-05-20 | Stop reason: HOSPADM

## 2025-05-19 RX ORDER — ALBUTEROL SULFATE 90 UG/1
4 INHALANT RESPIRATORY (INHALATION) PRN
OUTPATIENT
Start: 2025-05-26

## 2025-05-19 RX ADMIN — HEPARIN 500 UNITS: 100 SYRINGE at 11:06

## 2025-05-19 RX ADMIN — SODIUM CHLORIDE, PRESERVATIVE FREE 10 ML: 5 INJECTION INTRAVENOUS at 09:32

## 2025-05-19 RX ADMIN — SODIUM CHLORIDE 1000 ML: 0.9 INJECTION, SOLUTION INTRAVENOUS at 09:35

## 2025-05-19 RX ADMIN — SODIUM CHLORIDE, PRESERVATIVE FREE 10 ML: 5 INJECTION INTRAVENOUS at 11:06

## 2025-05-19 NOTE — PROGRESS NOTES
Patient here for hydration  Arrives ambulatory.  Denies any new complaints.  Treatment complete without incident.  Patient discharged in stable condition.  Returns 5/23/25 for C1D43.

## 2025-05-20 ENCOUNTER — HOSPITAL ENCOUNTER (OUTPATIENT)
Dept: RADIATION ONCOLOGY | Age: 65
Discharge: HOME | End: 2025-05-20
Payer: MEDICARE

## 2025-05-20 ENCOUNTER — SOCIAL WORK (OUTPATIENT)
Age: 65
End: 2025-05-20

## 2025-05-20 PROCEDURE — 77386 HC NTSTY MODUL RAD TX DLVR CPLX: CPT | Performed by: RADIOLOGY

## 2025-05-20 NOTE — PROGRESS NOTES
called [employer] and scheduled rides (5/27-5/30).      Transportation details:  [employer] [phone]   @ 10:45 am  Confirmation #[id number]. #[id number], #[id number], #[id number]  Return ride 12:15 pm, call for ride on 5/28

## 2025-05-21 ENCOUNTER — CLINICAL DOCUMENTATION (OUTPATIENT)
Age: 65
End: 2025-05-21

## 2025-05-21 ENCOUNTER — HOSPITAL ENCOUNTER (OUTPATIENT)
Dept: RADIATION ONCOLOGY | Age: 65
Discharge: HOME | End: 2025-05-21
Payer: MEDICARE

## 2025-05-21 VITALS
SYSTOLIC BLOOD PRESSURE: 145 MMHG | DIASTOLIC BLOOD PRESSURE: 71 MMHG | RESPIRATION RATE: 16 BRPM | BODY MASS INDEX: 21.08 KG/M2 | WEIGHT: 130.6 LBS | TEMPERATURE: 98.2 F | OXYGEN SATURATION: 98 %

## 2025-05-21 PROCEDURE — 77386 HC NTSTY MODUL RAD TX DLVR CPLX: CPT | Performed by: RADIOLOGY

## 2025-05-21 ASSESSMENT — PAIN SCALES - GENERAL: PAINLEVEL_OUTOF10: 0

## 2025-05-21 NOTE — PROGRESS NOTES
[facility]       Radiation Oncology          [address] [place] [address]        O: [phone]        F: [phone]       [facility]             RADIATION ONCOLOGY WEEKLY PROGRESS NOTE  Patient ID:   Matt Mao  : 1960   MRN: [id number]    Location:  [facility] Radiation Oncology,   [id number] [place] [address] Rd[address] [place] [address]   [phone]    DIAGNOSIS:  Squamous cell carcinoma of the supraglottic larynx T2 N1 M0     TREATMENT DETAILS:  Treatment Site: Larynx + LN  Actual Dose: 5600cGy  Total Planned Dose: 7000cGy  Treatment Technique: IMRT  Fraction Technique: Daily  Therapy imaging monitoring: CBCT daily  Concurrent Chemotherapy: Weekly cisplatin    SUBJECTIVE:   Patient seen for their weekly on treatment evaluation today.  Reports worsening dysphagia, utilizing his feeding tube more often, weight is reportedly stable.  Pain is well-controlled.    OBJECTIVE:     ECO Asymptomatic    Wt Readings from Last 5 Encounters:   25 59.2 kg (130 lb 9.6 oz)   25 60 kg (132 lb 3.2 oz)   25 59.2 kg (130 lb 9.6 oz)   25 60.8 kg (134 lb)   25 59.2 kg (130 lb 9.6 oz)     GENERAL:  General appearance is that of a well-nourished, well-developed in no apparent distress.  HEART:  Normal rate and regular rhythm  LUNGS:  Pulmonary effort normal.  ABDOMEN:  Soft, nontender, non distended  EXTREMITIES:  No edema.  No calf tenderness.  MSK:  No spinal tenderness. Normal ROM.  NEUROLOGICAL: Alert and oriented. Strength and sensation intact bilaterally. No focal deficits.   PSYCH: Mood normal, behavior normal.      LABS:  WBC   Date Value Ref Range Status   2025 2.4 (L) 3.5 - 11.0 k/uL Final   2025 3.9 3.5 - 11.0 k/uL Final   2025 6.1 3.5 - 11.0 k/uL Final     Neutrophils Absolute   Date Value Ref Range Status   2025 1.73 (L) 1.8 - 7.7 k/uL Final   2025 2.70 1.8 - 7.7 k/uL Final   2025

## 2025-05-21 NOTE — PROGRESS NOTES
Matt Mao  5/21/2025  Wt Readings from Last 3 Encounters:   05/21/25 59.2 kg (130 lb 9.6 oz)   05/16/25 60 kg (132 lb 3.2 oz)   05/14/25 59.2 kg (130 lb 9.6 oz)     Body mass index is 21.08 kg/m².        Treatment Area:Larynx (with chemo)    Patient was seen today for weekly visit.     Comfort Alteration  Fatigue: Mild    Mucous Membrane Alteration  Mucositis Due to Radiation: No  Thrush: No  Voice Changes:Yes- raspy    Nutritional Alteration-Using feeding tube 3 times/day with Boost  Anorexia: Yes  Nausea: No  Vomiting: No     Ventilation Alteration  Cough:Occasional    Skin Alteration   Sensation:intact    Radiation Dermatitis:  Intact [x]     Erythema  [x]     Discoloration  [x]    Rash []     Dry desquamation  []     Moist desquamation []       Emotional  Coping: effective      Injury, potential bleeding or infection: skin care BID reinforced    Lab Results   Component Value Date    WBC 2.4 (L) 05/16/2025    HGB 8.5 (L) 05/16/2025    HCT 25.6 (L) 05/16/2025     05/16/2025         BP (!) 145/71   Temp 98.2 °F (36.8 °C) (Temporal)   Resp 16   Wt 59.2 kg (130 lb 9.6 oz)   SpO2 98%   BMI 21.08 kg/m²      Pain Assessment: 0-10  Pain Level: 0         Assessment/Plan: Patient was seen today for weekly visit. He is using his feeding tube with Boost 3 times per day.  States taking pain medication orally and having some pain with swallowing pills.  No signs of thrush and doing mouth rinses and using topical creams as instructed.  Skin to his neck has mild erythema.  Plan of care ongoing.  Mary Jane Al RN

## 2025-05-21 NOTE — PROGRESS NOTES
[facility]- MEDICAL NUTRITION THERAPY     Visit Type: Follow-up     NUTRITION RECOMMENDATIONS / MONITORING / EVALUATION  Continue oral diet as tolerated.   Continue Boost via mouth or PEG (currently using PEG); send Rx to DME for Boost Plus QID + bolus supplies.   Will continue to monitor adequacy of nutrient intake, weight, and care plans.     Subjective/Current Data:  Matt Mao is a 64 y.o. male with invasive keratinizing squamous cell carcinoma of supraglottic larynx, on concurrent chemoradiation   Chart reviewed and met with patient after radiation appt today. Pt reports he is still eating by mouth, but starting to have more difficulty swallowing. Started using PEG for nutrition; currently giving 3 bottles of Boost Original/day as these are difficult to swallow per pt. Regularly flushes PEG tube with water.     Objective Data:  Patient Active Problem List    Diagnosis Date Noted    Malignant neoplasm of supraglottis (HCC) 03/13/2025    Laryngeal mass 02/21/2025     Anthropometric Measures:  Height: 5' 6\"  Weight: 130 lb 9.6 oz (59.2 kg)  Ideal Body Weight: 142 lb (64.5 kg)  BMI: 21.08 kg/m2 (Normal Weight)    Wt Readings from Last 20 Encounters:   05/21/25 59.2 kg (130 lb 9.6 oz)   05/16/25 60 kg (132 lb 3.2 oz)   05/14/25 59.2 kg (130 lb 9.6 oz)   05/09/25 60.8 kg (134 lb)   05/07/25 59.2 kg (130 lb 9.6 oz)   05/05/25 59.4 kg (131 lb)   05/02/25 59.4 kg (131 lb)   04/30/25 61.1 kg (134 lb 9.6 oz)   04/25/25 61.2 kg (135 lb)   04/23/25 58.9 kg (129 lb 12.8 oz)   04/23/25 58.9 kg (129 lb 12.8 oz)   04/18/25 62.3 kg (137 lb 6.4 oz)   04/16/25 63 kg (138 lb 12.8 oz)     Estimated Nutrition Needs:  Estimated Calorie Needs: 1800 kcal/day   Estimated Protein Needs: 70-90 g pro/day   Estimated Fluid Needs: 1800 mL/day      Current Nutrition Regimen:  Oral Diet: Diet as tolerated  Nutrition Supplement: Boost Original TID via PEG (240 kcal, 10 g pro per each 8 oz bottle) =720 kcal, 30 g protein

## 2025-05-22 ENCOUNTER — TELEPHONE (OUTPATIENT)
Age: 65
End: 2025-05-22

## 2025-05-22 ENCOUNTER — HOSPITAL ENCOUNTER (OUTPATIENT)
Dept: RADIATION ONCOLOGY | Age: 65
Discharge: HOME | End: 2025-05-22
Payer: MEDICARE

## 2025-05-22 PROCEDURE — 77386 HC NTSTY MODUL RAD TX DLVR CPLX: CPT | Performed by: RADIOLOGY

## 2025-05-22 NOTE — TELEPHONE ENCOUNTER
[facility] Oncology Nutrition Note:   Writer had missed call/message from Melita at [facility]. Melita states she received the order for Boost/PEG supplies from our office yesterday. Melita called patient re: order and patient stated he already has Boost so Melita unsure if patient still needs Rx filled or not. Writer called and spoke with patient who reports he has been purchasing Boost through his spending card. Pt would like to proceed with processing order through insurance to see if it can be covered that way. Writer called [facility] to inform; they will proceed with processing prescription. Will follow up on order status.     Huyen Garcia RD, LD, CNSC  Registered Dietitian  [facility]   [phone]

## 2025-05-23 ENCOUNTER — OFFICE VISIT (OUTPATIENT)
Age: 65
End: 2025-05-23

## 2025-05-23 ENCOUNTER — HOSPITAL ENCOUNTER (OUTPATIENT)
Dept: RADIATION ONCOLOGY | Age: 65
Discharge: HOME | End: 2025-05-23
Payer: MEDICARE

## 2025-05-23 ENCOUNTER — HOSPITAL ENCOUNTER (OUTPATIENT)
Dept: INFUSION THERAPY | Age: 65
Discharge: HOME | End: 2025-05-23
Payer: MEDICARE

## 2025-05-23 ENCOUNTER — TELEPHONE (OUTPATIENT)
Age: 65
End: 2025-05-23

## 2025-05-23 ENCOUNTER — CLINICAL DOCUMENTATION (OUTPATIENT)
Age: 65
End: 2025-05-23

## 2025-05-23 VITALS
DIASTOLIC BLOOD PRESSURE: 80 MMHG | TEMPERATURE: 97.7 F | RESPIRATION RATE: 16 BRPM | OXYGEN SATURATION: 96 % | SYSTOLIC BLOOD PRESSURE: 148 MMHG | HEART RATE: 78 BPM | BODY MASS INDEX: 21.34 KG/M2 | WEIGHT: 132.2 LBS

## 2025-05-23 DIAGNOSIS — Z51.11 CHEMOTHERAPY MANAGEMENT, ENCOUNTER FOR: ICD-10-CM

## 2025-05-23 DIAGNOSIS — C32.1 MALIGNANT NEOPLASM OF SUPRAGLOTTIS (HCC): Primary | ICD-10-CM

## 2025-05-23 DIAGNOSIS — D70.1 CHEMOTHERAPY INDUCED NEUTROPENIA: ICD-10-CM

## 2025-05-23 DIAGNOSIS — T45.1X5A CHEMOTHERAPY INDUCED NEUTROPENIA: ICD-10-CM

## 2025-05-23 LAB
ALBUMIN SERPL-MCNC: 3.9 G/DL (ref 3.5–5.2)
ALBUMIN/GLOB SERPL: 1.4 {RATIO} (ref 1–2.5)
ALP SERPL-CCNC: 78 U/L (ref 40–129)
ALT SERPL-CCNC: 16 U/L (ref 10–50)
ANION GAP SERPL CALCULATED.3IONS-SCNC: 13 MMOL/L (ref 9–16)
AST SERPL-CCNC: 26 U/L (ref 10–50)
BASOPHILS # BLD: 0.02 K/UL (ref 0–0.2)
BASOPHILS NFR BLD: 1 % (ref 0–2)
BILIRUB SERPL-MCNC: <0.2 MG/DL (ref 0–1.2)
BUN SERPL-MCNC: 17 MG/DL (ref 8–23)
CALCIUM SERPL-MCNC: 7.4 MG/DL (ref 8.6–10.4)
CHLORIDE SERPL-SCNC: 98 MMOL/L (ref 98–107)
CO2 SERPL-SCNC: 25 MMOL/L (ref 20–31)
CREAT SERPL-MCNC: 1.1 MG/DL (ref 0.7–1.2)
EOSINOPHIL # BLD: 0.02 K/UL (ref 0–0.4)
EOSINOPHILS RELATIVE PERCENT: 1 % (ref 1–4)
ERYTHROCYTE [DISTWIDTH] IN BLOOD BY AUTOMATED COUNT: 13.5 % (ref 12.5–15.4)
GFR, ESTIMATED: 74 ML/MIN/1.73M2
GLUCOSE SERPL-MCNC: 86 MG/DL (ref 74–99)
HCT VFR BLD AUTO: 24.7 % (ref 41–53)
HGB BLD-MCNC: 8.2 G/DL (ref 13.5–17.5)
LYMPHOCYTES NFR BLD: 0.37 K/UL (ref 1–4.8)
LYMPHOCYTES RELATIVE PERCENT: 22 % (ref 24–44)
MAGNESIUM SERPL-MCNC: 1.3 MG/DL (ref 1.6–2.4)
MCH RBC QN AUTO: 30.4 PG (ref 26–34)
MCHC RBC AUTO-ENTMCNC: 33.1 G/DL (ref 31–37)
MCV RBC AUTO: 91.8 FL (ref 80–100)
MONOCYTES NFR BLD: 0.34 K/UL (ref 0.1–1.2)
MONOCYTES NFR BLD: 20 % (ref 2–11)
MORPHOLOGY: NORMAL
NEUTROPHILS NFR BLD: 56 % (ref 36–66)
NEUTS SEG NFR BLD: 0.95 K/UL (ref 1.8–7.7)
PHOSPHATE SERPL-MCNC: 3 MG/DL (ref 2.5–4.5)
PLATELET # BLD AUTO: 152 K/UL (ref 140–450)
PMV BLD AUTO: 8.1 FL (ref 6–12)
POTASSIUM SERPL-SCNC: 4.2 MMOL/L (ref 3.7–5.3)
PROT SERPL-MCNC: 6.6 G/DL (ref 6.6–8.7)
RBC # BLD AUTO: 2.69 M/UL (ref 4.5–5.9)
SODIUM SERPL-SCNC: 136 MMOL/L (ref 136–145)
WBC OTHER # BLD: 1.7 K/UL (ref 3.5–11)

## 2025-05-23 PROCEDURE — 96368 THER/DIAG CONCURRENT INF: CPT

## 2025-05-23 PROCEDURE — 96367 TX/PROPH/DG ADDL SEQ IV INF: CPT

## 2025-05-23 PROCEDURE — 2500000003 HC RX 250 WO HCPCS: Performed by: INTERNAL MEDICINE

## 2025-05-23 PROCEDURE — 77386 HC NTSTY MODUL RAD TX DLVR CPLX: CPT | Performed by: RADIOLOGY

## 2025-05-23 PROCEDURE — 96366 THER/PROPH/DIAG IV INF ADDON: CPT

## 2025-05-23 PROCEDURE — 96365 THER/PROPH/DIAG IV INF INIT: CPT

## 2025-05-23 PROCEDURE — 36415 COLL VENOUS BLD VENIPUNCTURE: CPT

## 2025-05-23 PROCEDURE — 80053 COMPREHEN METABOLIC PANEL: CPT

## 2025-05-23 PROCEDURE — 85025 COMPLETE CBC W/AUTO DIFF WBC: CPT

## 2025-05-23 PROCEDURE — 2580000003 HC RX 258: Performed by: INTERNAL MEDICINE

## 2025-05-23 PROCEDURE — 84100 ASSAY OF PHOSPHORUS: CPT

## 2025-05-23 PROCEDURE — 83735 ASSAY OF MAGNESIUM: CPT

## 2025-05-23 PROCEDURE — 6360000002 HC RX W HCPCS: Performed by: INTERNAL MEDICINE

## 2025-05-23 RX ORDER — HEPARIN 100 UNIT/ML
500 SYRINGE INTRAVENOUS PRN
Status: DISCONTINUED | OUTPATIENT
Start: 2025-05-23 | End: 2025-05-24 | Stop reason: HOSPADM

## 2025-05-23 RX ORDER — SODIUM CHLORIDE 0.9 % (FLUSH) 0.9 %
5-40 SYRINGE (ML) INJECTION PRN
OUTPATIENT
Start: 2025-05-30

## 2025-05-23 RX ORDER — SODIUM CHLORIDE AND POTASSIUM CHLORIDE 150; 900 MG/100ML; MG/100ML
INJECTION, SOLUTION INTRAVENOUS ONCE
Status: COMPLETED | OUTPATIENT
Start: 2025-05-23 | End: 2025-05-23

## 2025-05-23 RX ORDER — MAGNESIUM SULFATE 1 G/100ML
1000 INJECTION INTRAVENOUS ONCE
Status: COMPLETED | OUTPATIENT
Start: 2025-05-23 | End: 2025-05-23

## 2025-05-23 RX ORDER — HEPARIN 100 UNIT/ML
500 SYRINGE INTRAVENOUS PRN
OUTPATIENT
Start: 2025-05-30

## 2025-05-23 RX ORDER — SODIUM CHLORIDE 9 MG/ML
5-250 INJECTION, SOLUTION INTRAVENOUS PRN
Status: DISCONTINUED | OUTPATIENT
Start: 2025-05-23 | End: 2025-05-24 | Stop reason: HOSPADM

## 2025-05-23 RX ORDER — MAGNESIUM SULFATE IN WATER 40 MG/ML
2000 INJECTION, SOLUTION INTRAVENOUS ONCE
Status: COMPLETED | OUTPATIENT
Start: 2025-05-23 | End: 2025-05-23

## 2025-05-23 RX ORDER — SODIUM CHLORIDE 9 MG/ML
5-250 INJECTION, SOLUTION INTRAVENOUS PRN
Status: CANCELLED | OUTPATIENT
Start: 2025-05-30

## 2025-05-23 RX ORDER — SODIUM CHLORIDE 0.9 % (FLUSH) 0.9 %
5-40 SYRINGE (ML) INJECTION PRN
Status: DISCONTINUED | OUTPATIENT
Start: 2025-05-23 | End: 2025-05-24 | Stop reason: HOSPADM

## 2025-05-23 RX ADMIN — HEPARIN 500 UNITS: 100 SYRINGE at 15:57

## 2025-05-23 RX ADMIN — MAGNESIUM SULFATE HEPTAHYDRATE 1000 MG: 1 INJECTION, SOLUTION INTRAVENOUS at 12:42

## 2025-05-23 RX ADMIN — SODIUM CHLORIDE, PRESERVATIVE FREE 10 ML: 5 INJECTION INTRAVENOUS at 15:57

## 2025-05-23 RX ADMIN — SODIUM CHLORIDE AND POTASSIUM CHLORIDE: .9; .15 SOLUTION INTRAVENOUS at 11:26

## 2025-05-23 RX ADMIN — MAGNESIUM SULFATE HEPTAHYDRATE 1000 MG: 1 INJECTION, SOLUTION INTRAVENOUS at 11:25

## 2025-05-23 RX ADMIN — SODIUM CHLORIDE 110 ML/HR: 0.9 INJECTION, SOLUTION INTRAVENOUS at 11:24

## 2025-05-23 RX ADMIN — SODIUM CHLORIDE, PRESERVATIVE FREE 10 ML: 5 INJECTION INTRAVENOUS at 11:17

## 2025-05-23 RX ADMIN — MAGNESIUM SULFATE HEPTAHYDRATE 2000 MG: 40 INJECTION, SOLUTION INTRAVENOUS at 13:50

## 2025-05-23 NOTE — TELEPHONE ENCOUNTER
Instructions   from Dr. Chin Mota MD    Hold chemo rv in 1 week with tx         Tx held  RV during tx at 10 am on 5/30/25

## 2025-05-23 NOTE — PROGRESS NOTES
Matt Mao                                                                                                                  5/23/2025  MRN:   [id number]  YOB: 1960  PCP:                           Nolan Felix PA  Referring Physician: No ref. 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  Patient complains of right ear pain.    A new onset of sharp, shooting pain originating from the ear and radiating up to the neck is reported. Headaches have been experienced Pain management is achieved with medication taken every 8 hours, typically in the evening, with 2 tablets at night aiding in pain relief and sleep induction. No medication refills are required at this time. Hydration is maintained by consuming tea. No fever, chills, or ear drainage are reported. Additionally, no swelling in the neck or face is observed. Efforts are made to keep the ears clean. Increased drooling during sleep is noted, but not during the day.    Weight has decreased. A mouth sore was present but is almost resolved. Magic mouthwash is used.    Summary of Case/History:  Matt Mao a 64 y.o.male is a patient with squamous cell carcinoma of supraglottic larynx presents to the clinic to establish care and for further workup and evaluation.  History of Present Illness    Patient presents to the clinic for follow-up

## 2025-05-23 NOTE — PATIENT INSTRUCTIONS
Hold chemo rv in 1 week with tx    Per MD, not medically clear for DC at this time. Still c/o nausea and abd pain. CM following for DC planning.      09/27/24 1118   Discharge Reassessment   Assessment Type Discharge Planning Reassessment   Did the patient's condition or plan change since previous assessment? No   Discharge Plan discussed with: Patient   Communicated TATIANA with patient/caregiver Yes   Discharge Plan A Home with family   Discharge Plan B Home   DME Needed Upon Discharge  none   Transition of Care Barriers Does not adhere to care plan;Social   Why the patient remains in the hospital Requires continued medical care;Social issues

## 2025-05-23 NOTE — PROGRESS NOTES
[facility] Oncology Nutrition Note:   Received message from [facility] that Boost Plus and bolus supplies were approved and shipped out today.

## 2025-05-23 NOTE — PROGRESS NOTES
Pt here for C.1D.36. cisplatin  Arrives ambulatory.  Denies any new complaints.  Labs drawn from port, results reviewed.  ANC 0.95  Magnesium 1.3  Pt was seen by Dr. Mota, order rec'd to hold tx one week and proceed with hydration and magnesium replacement.  4 grams magnesium given.   Tx complete without incident.  Pt d/c'd in stable condition.  Returns 5/28/25  for hydration and 5/30 for C1D36.

## 2025-05-27 ENCOUNTER — TELEPHONE (OUTPATIENT)
Age: 65
End: 2025-05-27

## 2025-05-27 ENCOUNTER — APPOINTMENT (OUTPATIENT)
Dept: CT IMAGING | Age: 65
End: 2025-05-27
Payer: MEDICARE

## 2025-05-27 ENCOUNTER — HOSPITAL ENCOUNTER (OUTPATIENT)
Age: 65
Setting detail: OBSERVATION
Discharge: HOME | End: 2025-05-28
Attending: EMERGENCY MEDICINE | Admitting: STUDENT IN AN ORGANIZED HEALTH CARE EDUCATION/TRAINING PROGRAM
Payer: MEDICARE

## 2025-05-27 ENCOUNTER — APPOINTMENT (OUTPATIENT)
Dept: RADIATION ONCOLOGY | Age: 65
End: 2025-05-27
Payer: MEDICARE

## 2025-05-27 ENCOUNTER — TELEPHONE (OUTPATIENT)
Dept: RADIATION ONCOLOGY | Age: 65
End: 2025-05-27

## 2025-05-27 ENCOUNTER — SOCIAL WORK (OUTPATIENT)
Age: 65
End: 2025-05-27

## 2025-05-27 ENCOUNTER — CLINICAL DOCUMENTATION (OUTPATIENT)
Age: 65
End: 2025-05-27

## 2025-05-27 DIAGNOSIS — R42 LIGHTHEADEDNESS: Primary | ICD-10-CM

## 2025-05-27 PROBLEM — E86.0 DEHYDRATION: Status: ACTIVE | Noted: 2025-05-27

## 2025-05-27 LAB
ANION GAP SERPL CALCULATED.3IONS-SCNC: 13 MMOL/L (ref 9–16)
BASOPHILS # BLD: 0.02 K/UL (ref 0–0.2)
BASOPHILS NFR BLD: 1 % (ref 0–2)
BNP SERPL-MCNC: 544 PG/ML (ref 0–125)
BUN SERPL-MCNC: 18 MG/DL (ref 8–23)
CALCIUM SERPL-MCNC: 8.8 MG/DL (ref 8.6–10.4)
CHLORIDE SERPL-SCNC: 96 MMOL/L (ref 98–107)
CO2 SERPL-SCNC: 24 MMOL/L (ref 20–31)
CREAT SERPL-MCNC: 1.1 MG/DL (ref 0.7–1.2)
EOSINOPHIL # BLD: 0 K/UL (ref 0–0.4)
EOSINOPHILS RELATIVE PERCENT: 0 % (ref 1–4)
ERYTHROCYTE [DISTWIDTH] IN BLOOD BY AUTOMATED COUNT: 13.5 % (ref 12.5–15.4)
GFR, ESTIMATED: 74 ML/MIN/1.73M2
GLUCOSE SERPL-MCNC: 97 MG/DL (ref 74–99)
HCT VFR BLD AUTO: 23.6 % (ref 41–53)
HGB BLD-MCNC: 7.8 G/DL (ref 13.5–17.5)
INR PPP: 1 (ref 0.9–1.2)
LYMPHOCYTES NFR BLD: 0.24 K/UL (ref 1–4.8)
LYMPHOCYTES RELATIVE PERCENT: 12 % (ref 24–44)
MAGNESIUM SERPL-MCNC: 1.5 MG/DL (ref 1.6–2.4)
MCH RBC QN AUTO: 30.3 PG (ref 26–34)
MCHC RBC AUTO-ENTMCNC: 32.8 G/DL (ref 31–37)
MCV RBC AUTO: 92.4 FL (ref 80–100)
MONOCYTES NFR BLD: 0.34 K/UL (ref 0.1–0.8)
MONOCYTES NFR BLD: 17 % (ref 1–7)
MORPHOLOGY: NORMAL
NEUTROPHILS NFR BLD: 70 % (ref 36–66)
NEUTS SEG NFR BLD: 1.4 K/UL (ref 1.8–7.7)
PARTIAL THROMBOPLASTIN TIME: 29.6 SEC (ref 24–36)
PLATELET # BLD AUTO: 164 K/UL (ref 140–450)
PMV BLD AUTO: 8.4 FL (ref 6–12)
POTASSIUM SERPL-SCNC: 4.4 MMOL/L (ref 3.7–5.3)
PROTHROMBIN TIME: 12.8 SEC (ref 11.8–14.6)
RBC # BLD AUTO: 2.56 M/UL (ref 4.5–5.9)
SODIUM SERPL-SCNC: 133 MMOL/L (ref 136–145)
TROPONIN I SERPL HS-MCNC: 28 NG/L (ref 0–22)
TROPONIN I SERPL HS-MCNC: 29 NG/L (ref 0–22)
WBC OTHER # BLD: 2 K/UL (ref 3.5–11)

## 2025-05-27 PROCEDURE — 70450 CT HEAD/BRAIN W/O DYE: CPT

## 2025-05-27 PROCEDURE — 96361 HYDRATE IV INFUSION ADD-ON: CPT

## 2025-05-27 PROCEDURE — 2500000003 HC RX 250 WO HCPCS: Performed by: EMERGENCY MEDICINE

## 2025-05-27 PROCEDURE — 99222 1ST HOSP IP/OBS MODERATE 55: CPT | Performed by: STUDENT IN AN ORGANIZED HEALTH CARE EDUCATION/TRAINING PROGRAM

## 2025-05-27 PROCEDURE — 96365 THER/PROPH/DIAG IV INF INIT: CPT

## 2025-05-27 PROCEDURE — 85730 THROMBOPLASTIN TIME PARTIAL: CPT

## 2025-05-27 PROCEDURE — 99285 EMERGENCY DEPT VISIT HI MDM: CPT

## 2025-05-27 PROCEDURE — 36415 COLL VENOUS BLD VENIPUNCTURE: CPT

## 2025-05-27 PROCEDURE — 2580000003 HC RX 258: Performed by: EMERGENCY MEDICINE

## 2025-05-27 PROCEDURE — 96375 TX/PRO/DX INJ NEW DRUG ADDON: CPT

## 2025-05-27 PROCEDURE — 83735 ASSAY OF MAGNESIUM: CPT

## 2025-05-27 PROCEDURE — 85025 COMPLETE CBC W/AUTO DIFF WBC: CPT

## 2025-05-27 PROCEDURE — 6360000002 HC RX W HCPCS: Performed by: EMERGENCY MEDICINE

## 2025-05-27 PROCEDURE — 71260 CT THORAX DX C+: CPT

## 2025-05-27 PROCEDURE — 80048 BASIC METABOLIC PNL TOTAL CA: CPT

## 2025-05-27 PROCEDURE — G0378 HOSPITAL OBSERVATION PER HR: HCPCS

## 2025-05-27 PROCEDURE — 93005 ELECTROCARDIOGRAM TRACING: CPT | Performed by: EMERGENCY MEDICINE

## 2025-05-27 PROCEDURE — 2580000003 HC RX 258: Performed by: STUDENT IN AN ORGANIZED HEALTH CARE EDUCATION/TRAINING PROGRAM

## 2025-05-27 PROCEDURE — 6360000004 HC RX CONTRAST MEDICATION: Performed by: EMERGENCY MEDICINE

## 2025-05-27 PROCEDURE — 85610 PROTHROMBIN TIME: CPT

## 2025-05-27 PROCEDURE — 84484 ASSAY OF TROPONIN QUANT: CPT

## 2025-05-27 PROCEDURE — 83880 ASSAY OF NATRIURETIC PEPTIDE: CPT

## 2025-05-27 RX ORDER — 0.9 % SODIUM CHLORIDE 0.9 %
80 INTRAVENOUS SOLUTION INTRAVENOUS ONCE
Status: DISCONTINUED | OUTPATIENT
Start: 2025-05-27 | End: 2025-05-28 | Stop reason: HOSPADM

## 2025-05-27 RX ORDER — ONDANSETRON 2 MG/ML
4 INJECTION INTRAMUSCULAR; INTRAVENOUS ONCE
Status: COMPLETED | OUTPATIENT
Start: 2025-05-27 | End: 2025-05-27

## 2025-05-27 RX ORDER — 0.9 % SODIUM CHLORIDE 0.9 %
500 INTRAVENOUS SOLUTION INTRAVENOUS ONCE
Status: COMPLETED | OUTPATIENT
Start: 2025-05-27 | End: 2025-05-27

## 2025-05-27 RX ORDER — SUCRALFATE 1 G/1
1 TABLET ORAL 4 TIMES DAILY
Status: DISCONTINUED | OUTPATIENT
Start: 2025-05-27 | End: 2025-05-27

## 2025-05-27 RX ORDER — ONDANSETRON 4 MG/1
4 TABLET, ORALLY DISINTEGRATING ORAL EVERY 8 HOURS PRN
Status: DISCONTINUED | OUTPATIENT
Start: 2025-05-27 | End: 2025-05-28 | Stop reason: HOSPADM

## 2025-05-27 RX ORDER — SODIUM CHLORIDE 9 MG/ML
INJECTION, SOLUTION INTRAVENOUS CONTINUOUS
Status: DISCONTINUED | OUTPATIENT
Start: 2025-05-27 | End: 2025-05-28 | Stop reason: HOSPADM

## 2025-05-27 RX ORDER — ACETAMINOPHEN 325 MG/1
650 TABLET ORAL EVERY 6 HOURS PRN
Status: DISCONTINUED | OUTPATIENT
Start: 2025-05-27 | End: 2025-05-28 | Stop reason: HOSPADM

## 2025-05-27 RX ORDER — POTASSIUM CHLORIDE 1500 MG/1
40 TABLET, EXTENDED RELEASE ORAL PRN
Status: DISCONTINUED | OUTPATIENT
Start: 2025-05-27 | End: 2025-05-28 | Stop reason: HOSPADM

## 2025-05-27 RX ORDER — ATORVASTATIN CALCIUM 40 MG/1
80 TABLET, FILM COATED ORAL NIGHTLY
Status: DISCONTINUED | OUTPATIENT
Start: 2025-05-27 | End: 2025-05-27

## 2025-05-27 RX ORDER — ACETAMINOPHEN 650 MG/1
650 SUPPOSITORY RECTAL EVERY 6 HOURS PRN
Status: DISCONTINUED | OUTPATIENT
Start: 2025-05-27 | End: 2025-05-28 | Stop reason: HOSPADM

## 2025-05-27 RX ORDER — SODIUM CHLORIDE 0.9 % (FLUSH) 0.9 %
5-40 SYRINGE (ML) INJECTION EVERY 12 HOURS SCHEDULED
Status: DISCONTINUED | OUTPATIENT
Start: 2025-05-27 | End: 2025-05-28 | Stop reason: HOSPADM

## 2025-05-27 RX ORDER — POTASSIUM CHLORIDE 7.45 MG/ML
10 INJECTION INTRAVENOUS PRN
Status: DISCONTINUED | OUTPATIENT
Start: 2025-05-27 | End: 2025-05-28 | Stop reason: HOSPADM

## 2025-05-27 RX ORDER — IOPAMIDOL 755 MG/ML
75 INJECTION, SOLUTION INTRAVASCULAR
Status: COMPLETED | OUTPATIENT
Start: 2025-05-27 | End: 2025-05-27

## 2025-05-27 RX ORDER — LISINOPRIL 5 MG/1
5 TABLET ORAL DAILY
Status: DISCONTINUED | OUTPATIENT
Start: 2025-05-28 | End: 2025-05-27

## 2025-05-27 RX ORDER — MAGNESIUM SULFATE 1 G/100ML
1000 INJECTION INTRAVENOUS ONCE
Status: COMPLETED | OUTPATIENT
Start: 2025-05-27 | End: 2025-05-27

## 2025-05-27 RX ORDER — POLYETHYLENE GLYCOL 3350 17 G/17G
17 POWDER, FOR SOLUTION ORAL DAILY PRN
Status: DISCONTINUED | OUTPATIENT
Start: 2025-05-27 | End: 2025-05-28 | Stop reason: HOSPADM

## 2025-05-27 RX ORDER — SODIUM CHLORIDE 0.9 % (FLUSH) 0.9 %
5-40 SYRINGE (ML) INJECTION PRN
Status: DISCONTINUED | OUTPATIENT
Start: 2025-05-27 | End: 2025-05-28 | Stop reason: HOSPADM

## 2025-05-27 RX ORDER — MAGNESIUM SULFATE IN WATER 40 MG/ML
2000 INJECTION, SOLUTION INTRAVENOUS PRN
Status: DISCONTINUED | OUTPATIENT
Start: 2025-05-27 | End: 2025-05-28 | Stop reason: HOSPADM

## 2025-05-27 RX ORDER — ENOXAPARIN SODIUM 100 MG/ML
40 INJECTION SUBCUTANEOUS EVERY EVENING
Status: DISCONTINUED | OUTPATIENT
Start: 2025-05-27 | End: 2025-05-28 | Stop reason: HOSPADM

## 2025-05-27 RX ORDER — SODIUM CHLORIDE 9 MG/ML
INJECTION, SOLUTION INTRAVENOUS PRN
Status: DISCONTINUED | OUTPATIENT
Start: 2025-05-27 | End: 2025-05-28 | Stop reason: HOSPADM

## 2025-05-27 RX ORDER — PANTOPRAZOLE SODIUM 40 MG/1
40 TABLET, DELAYED RELEASE ORAL DAILY
Status: DISCONTINUED | OUTPATIENT
Start: 2025-05-28 | End: 2025-05-27

## 2025-05-27 RX ORDER — AMLODIPINE BESYLATE 5 MG/1
10 TABLET ORAL DAILY
Status: DISCONTINUED | OUTPATIENT
Start: 2025-05-28 | End: 2025-05-27

## 2025-05-27 RX ORDER — SODIUM CHLORIDE 0.9 % (FLUSH) 0.9 %
10 SYRINGE (ML) INJECTION PRN
Status: DISCONTINUED | OUTPATIENT
Start: 2025-05-27 | End: 2025-05-28 | Stop reason: HOSPADM

## 2025-05-27 RX ORDER — ONDANSETRON 2 MG/ML
4 INJECTION INTRAMUSCULAR; INTRAVENOUS EVERY 6 HOURS PRN
Status: DISCONTINUED | OUTPATIENT
Start: 2025-05-27 | End: 2025-05-28 | Stop reason: HOSPADM

## 2025-05-27 RX ADMIN — SODIUM CHLORIDE: 0.9 INJECTION, SOLUTION INTRAVENOUS at 18:11

## 2025-05-27 RX ADMIN — ONDANSETRON 4 MG: 2 INJECTION, SOLUTION INTRAMUSCULAR; INTRAVENOUS at 17:14

## 2025-05-27 RX ADMIN — SODIUM CHLORIDE 500 ML: 9 INJECTION, SOLUTION INTRAVENOUS at 12:29

## 2025-05-27 RX ADMIN — Medication 80 ML: at 12:56

## 2025-05-27 RX ADMIN — SODIUM CHLORIDE, PRESERVATIVE FREE 10 ML: 5 INJECTION INTRAVENOUS at 12:56

## 2025-05-27 RX ADMIN — IOPAMIDOL 75 ML: 755 INJECTION, SOLUTION INTRAVENOUS at 12:56

## 2025-05-27 RX ADMIN — MAGNESIUM SULFATE HEPTAHYDRATE 1000 MG: 1 INJECTION, SOLUTION INTRAVENOUS at 14:39

## 2025-05-27 ASSESSMENT — PAIN - FUNCTIONAL ASSESSMENT: PAIN_FUNCTIONAL_ASSESSMENT: 0-10

## 2025-05-27 ASSESSMENT — ENCOUNTER SYMPTOMS
SHORTNESS OF BREATH: 0
ABDOMINAL PAIN: 0
COUGH: 0
DIARRHEA: 0
NAUSEA: 0
CHEST TIGHTNESS: 0
CONSTIPATION: 0
EYE REDNESS: 0
VOMITING: 0

## 2025-05-27 ASSESSMENT — PAIN DESCRIPTION - LOCATION: LOCATION: THROAT

## 2025-05-27 ASSESSMENT — LIFESTYLE VARIABLES
HOW OFTEN DO YOU HAVE A DRINK CONTAINING ALCOHOL: NEVER
HOW MANY STANDARD DRINKS CONTAINING ALCOHOL DO YOU HAVE ON A TYPICAL DAY: PATIENT DOES NOT DRINK

## 2025-05-27 ASSESSMENT — PAIN SCALES - GENERAL: PAINLEVEL_OUTOF10: 8

## 2025-05-27 ASSESSMENT — PAIN DESCRIPTION - PAIN TYPE: TYPE: ACUTE PAIN

## 2025-05-27 ASSESSMENT — PAIN DESCRIPTION - DESCRIPTORS: DESCRIPTORS: ACHING

## 2025-05-27 NOTE — PROGRESS NOTES
[facility] Oncology Nutrition Note:   Received call from patient's nurse navigator, Ember, stating patient requesting to speak with RD re: PEG supplies. Ember states pt is currently in the ED re: feeling lightheaded. Writer met with patient while waiting. Pt states he received his formula, syringes, gauze, but no tape. Would like RD to contact [employer] to see if they can send him paper tape. Pt states he has some at home to use in the meantime. Pt also requesting deliveries be dropped off in back of complex.   Writer called [facility] and spoke with [employer]. [employer] will get paper tape shipped out and pt should have within a few days. Grupo also states he will put an additional note in the comments section of his order to deliver boxes to back of complex.     Huyen Garcia RD, LD, CNSC  Registered Dietitian  [facility]   [phone]

## 2025-05-27 NOTE — ED PROVIDER NOTES
provided for review. Automated exposure control, iterative reconstruction, and/or weight based adjustment of the mA/kV was utilized to reduce the radiation dose to as low as reasonably achievable. COMPARISON: None. HISTORY: ORDERING SYSTEM PROVIDED HISTORY: LIght headedness. Recent PEG placement. Hx of cancer. TECHNOLOGIST PROVIDED HISTORY: LIght headedness. Recent PEG placement. Hx of cancer. Additional Contrast?->1 Reason for Exam: LIght headedness. Recent PEG placement. Hx of cancer. FINDINGS: Pulmonary Arteries: Pulmonary arteries are adequately opacified for evaluation.  No evidence of intraluminal filling defect to suggest pulmonary embolism.  Main pulmonary artery is normal in caliber. Lines and tubes: None Mediastinum and Hilum: No enlarged lymph nodes Heart and Vasculature: Mild coronary calcifications.  Mild aortic valvular calcifications. Pleura: No effusions Upper abdomen: Unremarkable Lungs and airways: Emphysema with biapical scarring.  Juxtapleural density seen along the left lower lobe measuring 2 x 0.6 cm could relate to scarring alt and stable from 02/28/2025 PET-CT Bones/soft tissue: No destructive lesion     1. No evidence for pulmonary embolism. 2. Emphysema with stable juxtapleural density seen in the left lower lobe. Likely relates to scarring.  Consider the 3 six-month follow-up     CT HEAD WO CONTRAST  Result Date: 5/27/2025  EXAMINATION: CT OF THE HEAD WITHOUT CONTRAST  5/27/2025 12:53 pm TECHNIQUE: CT of the head was performed without the administration of intravenous contrast. Automated exposure control, iterative reconstruction, and/or weight based adjustment of the mA/kV was utilized to reduce the radiation dose to as low as reasonably achievable. COMPARISON: None. HISTORY: ORDERING SYSTEM PROVIDED HISTORY: light headedness and HA. FINDINGS: BRAIN/VENTRICLES:  No evidence of an acute infarct or other acute parenchymal process.  No evidence of acute intracranial hemorrhage.  There is no  evidence of an intracranial mass or extraaxial fluid collection. No significant mass effect or midline shift. There is mild generalized volume loss. Ventricular enlargement concordant with the degree of parenchymal volume loss. Patchy foci of low attenuation are present within supratentorial white matter which is a nonspecific finding but likely represents moderate chronic microvascular ischemia. Atherosclerotic calcification present throughout the carotid siphons and intracranial vertebral arteries. ORBITS: The visualized portion of the orbits demonstrate no acute abnormality. SINUSES:  The visualized paranasal sinuses and mastoid air cells demonstrate no acute abnormality. SOFT TISSUES/SKULL: No acute abnormality of the visualized skull or soft tissues.     No acute intracranial abnormality.       CT CHEST PULMONARY EMBOLISM W CONTRAST   Final Result   1. No evidence for pulmonary embolism.   2. Emphysema with stable juxtapleural density seen in the left lower lobe.   Likely relates to scarring.  Consider the 3 six-month follow-up         CT HEAD WO CONTRAST   Final Result   No acute intracranial abnormality.             MEDICAL DECISION MAKING:   Patient presents to the emerged part complaining of lightheadedness.  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  Patient's workup done in the ER shows patient that in no acute distress no abdominal tenderness.  Patient did have recent procedure with PEG tube placed.  Patient's laboratory studies showed magnesium 1.5.  Troponin 29, repeat 28.  BNP is slightly elevated but clinically not fluid overloaded.  Given 500 cc bolus in the ER.  Patient's blood blood cell count is 2.0 and hemoglobin 7.8.  Similar to patient's previous.  Patient CT head shows no acute intracranial hemorrhage.  CT of the chest shows no pulmonary embolism.  On repeat exam patient is improved.  We were able to ambulate patient to the restroom and back he stated that he felt better but just prior

## 2025-05-27 NOTE — TELEPHONE ENCOUNTER
Reviewed results of cbc with diff from Friday with Dr. Hernandez.  Per Dr. Hernandez hold treatment this week with pretreat labs on Friday 5/27/25 due to low WBC/ANC.  Patient did not get chemo on 5/23/25.  Updated  and Shamir, therapist, as well.

## 2025-05-27 NOTE — ED NOTES
ED to inpatient nurses report      Chief Complaint:  Chief Complaint   Patient presents with    Dizziness     Weakness, light head and dizziness.  Onset about 2 days ago.  Pt currently receiving chemo and radiation treatment for throat cancer.   Pt also had abnormal lab results recently.      Present to ED from: home    MOA:     LOC: alert and orientated to name, place, date  Mobility: Independent  Oxygen Baseline: room air    Current needs required: room air   Pending ED orders: none  Present condition: stable    Why did the patient come to the ED? dizziness    What is the plan? Admit dizziness    Any procedures or intervention occur? no    Pertinent event(s) pt is in his last week of chemo/radiation for throat cancer    Safety concerns??pt is ambulatory    CODE STATUS No Order    Diet No diet orders on file    Mental Status:  Level of Consciousness: Alert (0)    Psych Assessment:   Psychosocial  Psychosocial (WDL): Within Defined Limits  Vital signs   Vitals:    05/27/25 1130   BP: (!) 141/51   Pulse: 90   Resp: 18   Temp: 98.6 °F (37 °C)   TempSrc: Oral   SpO2: 97%   Weight: 59 kg (130 lb 1.1 oz)   Height: 1.66 m (5' 5.35\")        Vitals:  Patient Vitals for the past 24 hrs:   BP Temp Temp src Pulse Resp SpO2 Height Weight   05/27/25 1130 (!) 141/51 98.6 °F (37 °C) Oral 90 18 97 % 1.66 m (5' 5.35\") 59 kg (130 lb 1.1 oz)      Visit Vitals  BP (!) 141/51   Pulse 90   Temp 98.6 °F (37 °C) (Oral)   Resp 18   Ht 1.66 m (5' 5.35\")   Wt 59 kg (130 lb 1.1 oz)   SpO2 97%   BMI 21.41 kg/m²        LDAs:   Peripheral IV 05/27/25 Distal;Left Cephalic (Active)       Meds:  Medications   sodium chloride flush 0.9 % injection 10 mL (10 mLs IntraVENous Given 5/27/25 1256)   sodium chloride 0.9 % bolus 80 mL (80 mLs IntraVENous Bolus from Bag 5/27/25 1256)   sodium chloride 0.9 % bolus 500 mL (0 mLs IntraVENous Stopped 5/27/25 1546)   iopamidol (ISOVUE-370) 76 % injection 75 mL (75 mLs IntraVENous Given 5/27/25 1256)   magnesium

## 2025-05-27 NOTE — DISCHARGE INSTRUCTIONS
Take your medication as indicated and prescribed.   Get up slowly; dangle your feet over the bed before standing up, do not stand up quickly.    If you have not had a stress test in over a year your primary care physician may order this test as further work-up for your lightheadedness.  If you have a cardiologist, then you should also call them to discuss further treatment options.    PLEASE RETURN TO THE EMERGENCY DEPARTMENT IMMEDIATELY for worsening symptoms of increasing pain, shortness of breath, feeling of your heart fluttering or racing, swelling to your feet, unable to lay flat, sensation of the room spinning, slurring of speech, loss of strength, or if you develop any concerning symptoms such as: high fever not relieved by acetaminophen (Tylenol) and/or ibuprofen (Motrin / Advil), chills, persistent nausea and/or vomiting, vomiting up blood, blood in your stool, loss of consciousness, numbness, weakness or tingling in the arms or legs or change in color of the extremities, changes in mental status, persistent headache, blurry vision, loss of bladder / bowel control, unable to follow up with your physician, or other any other care or concern.    If you had imaging today, your results are:  CT CHEST PULMONARY EMBOLISM W CONTRAST   Final Result   1. No evidence for pulmonary embolism.   2. Emphysema with stable juxtapleural density seen in the left lower lobe.   Likely relates to scarring.  Consider the 3 six-month follow-up         CT HEAD WO CONTRAST   Final Result   No acute intracranial abnormality.             Please understand that at this time there is no evidence for a more serious underlying process, but that early in the process of an illness or injury, an emergency department workup can be falsely reassuring.  You should contact your family doctor within the next 24 hours for a follow up appointment. If you do not have one, we have attached the \"[facility]\" Physician line for you to call  and they can provide you with one ([phone]). .    THANK YOU!    From [facility] and [facility]    On behalf of the Emergency Department staff at [facility], I would like to thank you for giving us the opportunity to address your health care needs and concerns.    We hope that during your visit, our service was delivered in a professional and caring manner. Please keep [facility] in mind as we walk with you down the path to your own personal wellness.     Please understand that early in the process of an illness or injury, an emergency department workup can be falsely reassuring.  If you notice any worsening, changing or persistent symptoms please call your family doctor or return to the ER immediately.     Tell us how we did during your visit at [email or web address] and let us know about your experience

## 2025-05-27 NOTE — PROGRESS NOTES
called [employer] and scheduled rides (6/2 @11:30, 6/3 @10, 6/3 @ 11:30, 6/3 @ 2:30 pm, 6/4 @11:30, 6/4 @ 2 pm, 6/5 @ 11:30).      Transportation details:  [employer] [phone]   @ 10:45 am  Confirmation #[id number], #[id number], #[id number], [id number], #[id number], #[id number], #[id number]  Return ride at 12:30 on 6/2, 6/3 @ 12:15. call for ride on 6/3 @ 11:30, 6/4 @ 12:30 pm

## 2025-05-27 NOTE — H&P
[facility]  Office: [phone]  Jonnie Shepherd DO, Roverto Campbell, DO, Lisandro Lee DO, Anselmo Zarate, DO, Karlene Ball MD, Briana Maxwell MD, Timoteo Marina MD, Gwen Abbott MD,  Tim Miller MD, Shanae Friedman MD, Cedric Abraham MD,  Kaiden Coffey DO, Megan Gill MD, Mitchell Padilla MD, Matt Shepherd DO, Kate Castro MD,  Paul Moreno DO, Vijaya Hobson MD, Johanny Soares MD, Mis Max MD,  Pb Glynn MD, Yevgeniy Golden MD, Karen Linn MD, Will Silva MD, Koko Veloz MD, Jesusita Santizo MD, Preet Wilson, DO, Cris Alba MD, Darlin Mckeon DO, Jimbo Casas MD, Kaiden Perdomo MD, Mohsin Reza, MD, Rome Crawford MD, Shirley Waterhouse, CNP,  Estrella Richards, CNP, Preet Andino, CNP,  Huyen Cardozo, LUIS MANUEL, Philly Hayes, CNP, Ashley Munoz, CNP, Shalini Lentz, CNP, Kailee Macias, CNP, Marce Lu, PA-C, Dede Armstrong, CNP, Shelly Garcia, CNP,  Suyapa Arizmendi, CNP, Luci Camacho, CNP, Kevin Aviles, PA-C, Vickie Lanza, CNP,  Nohelia Foss, CNS, Lucia Berry, CNP, Laurita Mansfield, CNP,   Alejandra Canseco, CNP         [facility]   IN-PATIENT SERVICE   [facility]    HISTORY AND PHYSICAL EXAMINATION            Date:   5/27/2025  Patient name:  Matt Mao  Date of admission:  5/27/2025 11:27 AM  MRN:   [id number]  Account:  [id number]  YOB: 1960  PCP:    Nolan Felix PA  Room:   [place] [address]  Code Status:    No Order      History Obtained From:     patient    History of Present Illness:     Matt Mao is a 64 y.o. 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 The patient states that his last chemotherapy infusion was on 5/23. In the ED, the patient was afebrile and nontoxic appearing. Laboratory analysis was essentially at baseline. CT head and  for Nausea or Vomiting 4/11/25   Chin Mota MD   betamethasone valerate (VALISONE) 0.1 % cream Apply topically 2 times daily. 3/24/25   Morales Hernandez MD   Garlic 100 MG TABS Take 100 mg by mouth 2 times daily Last dose Monday 2/24/25    Edwar Peck MD   famotidine (PEPCID) 40 MG tablet Take 1 tablet by mouth daily    Edwar Peck MD   Magic Mouthwash (MIRACLE MOUTHWASH) Shake Well; For Oral Use. Lidocaine Viscous 2%; 80mL, Diphenhydramine 12.5MG/5Ml; 80mL, ALUM & MAG HYDROXIDE-SIMETH 200-200-20 MG/5ML; 80mL. 2/28/25   Reginald Larsen MD   ibuprofen (IBU) 400 MG tablet Take 1 tablet by mouth every 6 hours as needed for Pain (1-2 tabs (400-800mg) to alternate with Tylenol so that you are taking something for pain every 3 hours)  Patient not taking: Reported on 5/2/2025 2/28/25   Reynaldo Gary FNP   acetaminophen (TYLENOL) 325 MG tablet Take 2 tablets by mouth every 6 hours as needed for Pain Alternate with Motrin so that you are taking something for pain every 3 hours  Patient not taking: Reported on 5/2/2025 2/28/25   Reynaldo Gary FNP   cyclobenzaprine (FLEXERIL) 10 MG tablet Take 1 tablet by mouth 3 times daily as needed for Muscle spasms  Patient not taking: Reported on 5/2/2025    Edwar Peck MD   sildenafil (VIAGRA) 100 MG tablet Take 1 tablet by mouth as needed for Erectile Dysfunction  Patient not taking: Reported on 5/2/2025    Edwar Peck MD   amLODIPine (NORVASC) 5 MG tablet Take 2 tablets by mouth daily    Edwar Peck MD   lisinopril (PRINIVIL;ZESTRIL) 5 MG tablet Take 1 tablet by mouth daily    Provider, Historical, MD   Magic Mouthwash (MIRACLE MOUTHWASH) Swish and swallow 5 mLs 3 times daily as needed for Pain Please refer to pharmacist recommendations for concentrations of above additives.  I defer this decision to the practicing pharmacist 2/21/25   Reginald Larsen MD   aspirin (DEIRDRE ASPIRIN) 325 MG tablet Patient states takes a

## 2025-05-28 ENCOUNTER — HOSPITAL ENCOUNTER (OUTPATIENT)
Dept: INFUSION THERAPY | Age: 65
End: 2025-05-28

## 2025-05-28 ENCOUNTER — TELEPHONE (OUTPATIENT)
Dept: RADIATION ONCOLOGY | Age: 65
End: 2025-05-28

## 2025-05-28 ENCOUNTER — APPOINTMENT (OUTPATIENT)
Dept: RADIATION ONCOLOGY | Age: 65
End: 2025-05-28
Payer: MEDICARE

## 2025-05-28 ENCOUNTER — TELEPHONE (OUTPATIENT)
Age: 65
End: 2025-05-28

## 2025-05-28 VITALS
TEMPERATURE: 98.8 F | WEIGHT: 130.07 LBS | OXYGEN SATURATION: 96 % | RESPIRATION RATE: 16 BRPM | HEIGHT: 65 IN | BODY MASS INDEX: 21.67 KG/M2 | SYSTOLIC BLOOD PRESSURE: 157 MMHG | DIASTOLIC BLOOD PRESSURE: 62 MMHG | HEART RATE: 88 BPM

## 2025-05-28 LAB
ANION GAP SERPL CALCULATED.3IONS-SCNC: 11 MMOL/L (ref 9–16)
BASOPHILS # BLD: 0 K/UL (ref 0–0.2)
BASOPHILS NFR BLD: 0 % (ref 0–2)
BUN SERPL-MCNC: 13 MG/DL (ref 8–23)
CALCIUM SERPL-MCNC: 8.4 MG/DL (ref 8.6–10.4)
CHLORIDE SERPL-SCNC: 100 MMOL/L (ref 98–107)
CO2 SERPL-SCNC: 24 MMOL/L (ref 20–31)
CREAT SERPL-MCNC: 1.1 MG/DL (ref 0.7–1.2)
EOSINOPHIL # BLD: 0 K/UL (ref 0–0.4)
EOSINOPHILS RELATIVE PERCENT: 0 % (ref 1–4)
ERYTHROCYTE [DISTWIDTH] IN BLOOD BY AUTOMATED COUNT: 13.5 % (ref 12.5–15.4)
GFR, ESTIMATED: 74 ML/MIN/1.73M2
GLUCOSE SERPL-MCNC: 89 MG/DL (ref 74–99)
HCT VFR BLD AUTO: 23.2 % (ref 41–53)
HGB BLD-MCNC: 7.6 G/DL (ref 13.5–17.5)
LYMPHOCYTES NFR BLD: 0.34 K/UL (ref 1–4.8)
LYMPHOCYTES RELATIVE PERCENT: 19 % (ref 24–44)
MCH RBC QN AUTO: 30.4 PG (ref 26–34)
MCHC RBC AUTO-ENTMCNC: 32.7 G/DL (ref 31–37)
MCV RBC AUTO: 92.9 FL (ref 80–100)
MONOCYTES NFR BLD: 0.32 K/UL (ref 0.1–0.8)
MONOCYTES NFR BLD: 18 % (ref 1–7)
MORPHOLOGY: NORMAL
NEUTROPHILS NFR BLD: 63 % (ref 36–66)
NEUTS SEG NFR BLD: 1.14 K/UL (ref 1.8–7.7)
PLATELET # BLD AUTO: 166 K/UL (ref 140–450)
PMV BLD AUTO: 8.4 FL (ref 6–12)
POTASSIUM SERPL-SCNC: 4.7 MMOL/L (ref 3.7–5.3)
RBC # BLD AUTO: 2.5 M/UL (ref 4.5–5.9)
SODIUM SERPL-SCNC: 135 MMOL/L (ref 136–145)
WBC OTHER # BLD: 1.8 K/UL (ref 3.5–11)

## 2025-05-28 PROCEDURE — 80048 BASIC METABOLIC PNL TOTAL CA: CPT

## 2025-05-28 PROCEDURE — 85025 COMPLETE CBC W/AUTO DIFF WBC: CPT

## 2025-05-28 PROCEDURE — 99238 HOSP IP/OBS DSCHRG MGMT 30/<: CPT | Performed by: STUDENT IN AN ORGANIZED HEALTH CARE EDUCATION/TRAINING PROGRAM

## 2025-05-28 PROCEDURE — 36415 COLL VENOUS BLD VENIPUNCTURE: CPT

## 2025-05-28 PROCEDURE — 96361 HYDRATE IV INFUSION ADD-ON: CPT

## 2025-05-28 PROCEDURE — 77336 RADIATION PHYSICS CONSULT: CPT | Performed by: RADIOLOGY

## 2025-05-28 PROCEDURE — G0378 HOSPITAL OBSERVATION PER HR: HCPCS

## 2025-05-28 ASSESSMENT — PAIN SCALES - GENERAL: PAINLEVEL_OUTOF10: 4

## 2025-05-28 NOTE — TELEPHONE ENCOUNTER
Per nurse, pt declining radiation treatment today.  Plan then would be for pt to resume radiation treatments as scheduled tomorrow.  Updated  to help with setting up rides.

## 2025-05-28 NOTE — CARE COORDINATION
Case Management Assessment  Initial Evaluation    Date/Time of Evaluation: 5/28/2025 8:56 AM  Assessment Completed by: Derrick Hernández    If patient is discharged prior to next notation, then this note serves as note for discharge by case management.    Patient Name: Matt Mao                   YOB: 1960  Diagnosis: Dehydration [E86.0]  Lightheadedness [R42]                   Date / Time: 5/27/2025 11:27 AM    Patient Admission Status: Observation   Readmission Risk (Low < 19, Mod (19-27), High > 27): No data recorded  Current PCP: Nolan Felix PA  PCP verified by CM? (P) Yes    Chart Reviewed: Yes      History Provided by: (P) Patient  Patient Orientation: (P) Alert and Oriented    Patient Cognition: (P) Alert    Hospitalization in the last 30 days (Readmission):  No    If yes, Readmission Assessment in CM Navigator will be completed.    Advance Directives:      Code Status: Full Code   Patient's Primary Decision Maker is: (P) Legal Next of Kin      Discharge Planning:    Patient lives with: (P) Alone Type of Home: (P) Apartment  Primary Care Giver: (P) Self  Patient Support Systems include: (P) Family Members, Friends/Neighbors   Current Financial resources: (P) Medicare, Medicaid  Current community resources: (P) None  Current services prior to admission: (P) None            Current DME:              Type of Home Care services:  (P) None    ADLS  Prior functional level: (P) Independent in ADLs/IADLs  Current functional level: (P) Independent in ADLs/IADLs    PT AM-PAC:   /24  OT AM-PAC:   /24    Family can provide assistance at DC: (P) Yes  Would you like Case Management to discuss the discharge plan with any other family members/significant others, and if so, who? (P) No  Plans to Return to Present Housing: (P) Yes  Other Identified Issues/Barriers to RETURNING to current housing: clinical status   Potential Assistance needed at discharge: (P) N/A            Potential DME:

## 2025-05-28 NOTE — TELEPHONE ENCOUNTER
received update that patient discharged from hospital and labs at appropriate level for treatment.  set up ride for tomorrow's treatment. Patient updated.    Transportation details:  [employer] [phone]   11:00am  Confirmation [id number]  Return ride 12:15pm

## 2025-05-28 NOTE — PLAN OF CARE
Problem: Discharge Planning  Goal: Discharge to home or other facility with appropriate resources  5/28/2025 1255 by Nicholas Bautista LPN  Outcome: Progressing  5/28/2025 0403 by Gillian Canas LPN  Outcome: Progressing     Problem: Pain  Goal: Verbalizes/displays adequate comfort level or baseline comfort level  5/28/2025 1255 by Nicholas Bautista LPN  Outcome: Progressing  5/28/2025 0403 by Gillian Canas LPN  Outcome: Progressing     Problem: Safety - Adult  Goal: Free from fall injury  5/28/2025 1255 by Nicholas Bautista LPN  Outcome: Progressing  5/28/2025 0403 by Gillian Canas LPN  Outcome: Progressing

## 2025-05-28 NOTE — TELEPHONE ENCOUNTER
Reviewed with Dr. Finnegan that pt is admitted and also labs from today.  Dr. Finnegan states to continue with daily radiation treatments.  Updated Beckie, radiation therapist, and will coordinate with pt's nurse.

## 2025-05-28 NOTE — PROGRESS NOTES
Physical Therapy        Physical Therapy Cancel Note      DATE: 2025    NAME: Matt Mao  MRN: [id number]   : 1960      Patient not seen this date for Physical Therapy due to:    Patient Declined: Pt decline need acute therapy evaluation and waiting for his ride for discharge home. PLAN: discontinue acute PT evaluation per pt request.       Electronically signed by Estela Dudley PT on 2025 at 8:47 AM

## 2025-05-28 NOTE — PROGRESS NOTES
[facility]  Occupational Therapy Not Seen Note    DATE: 2025    NAME: Matt Mao  MRN: [id number]   : 1960      Patient not seen this date for Occupational Therapy due to:    Other: OT orders received. Checked in on pt this AM. Pt reporting no current therapy needs, independent and getting ready for discharge. If new needs should arise please re order. DC OT at this time.     Electronically signed by PATY FRAGOSO OT on 2025 at 8:48 AM

## 2025-05-28 NOTE — DISCHARGE SUMMARY
[facility]  Office: [phone]  Jonnie Shepherd DO, Roverto Campbell, DO, Lisandro Lee DO, Anselmo Zarate, DO, Karlene Ball MD, Briana Maxwell MD, Timoteo Marina MD, Gwen Abbott MD,  Tim Miller MD, Shanae Friedman MD, Cedric Abraham MD,  Kaiden Coffey DO, Megan Gill MD, Mitchell Padilla MD, Matt Shepherd DO, Kate Castro MD,  Paul Moreno DO, Vijaya Hobson MD, Johanny Soares MD, Mis Max MD,  Pb Glynn MD, Yevgeniy Golden MD, Karen Linn MD, Will Silva MD, Koko Veloz MD, Jesusita Santizo MD, Preet Wilson, DO, Cris Alba MD, Darlin Mckeon DO, Jimbo Casas MD, Kaiden Perdomo MD, Mohsin Reza, MD, Rome Crawford MD, Shirley Waterhouse, CNP,  Estrella Richards, CNP, Preet Andino, CNP,  Huyen Cardozo, LUIS MANUEL, Philly Hayes, CNP, Ashley Munoz, CNP, Shalini Lentz, CNP, Kailee Macias, CNP, Marce Lu, PA-C, Dede Armstrong, CNP, Shelly Garcia, CNP,  Suyapa Arizmendi, CNP, Luci Camacho, CNP, Kevin Aviles, PA-C, Vickie Lanza, CNP,  Nohelia Foss, CNS, Lucia Berry, CNP, Laurita Mansfield, CNP,   Alejandra Canseco, CNP         [facility]   IN-PATIENT SERVICE   [facility]    Discharge Summary     Patient ID: Matt Mao  :  1960   MRN: [id number]     ACCOUNT:  [id number]   Patient's PCP: Nolan Felix PA  Admit Date: 2025   Discharge Date: 2025  Length of Stay: 0  Code Status:  Full Code  Admitting Physician: Mitchell Padilla MD  Discharge Physician: Mitchell Padilla MD     Active Discharge Diagnoses:     Hospital Problem Lists:  Principal Problem:    Dehydration  Active Problems:    Malignant neoplasm of supraglottis (HCC)  Resolved Problems:    * No resolved hospital problems. *      Admission Condition:  fair     Discharged Condition: good    Hospital Stay:     Hospital Course:  Matt Mao is a 64 y.o. 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

## 2025-05-29 ENCOUNTER — APPOINTMENT (OUTPATIENT)
Dept: RADIATION ONCOLOGY | Age: 65
End: 2025-05-29
Payer: MEDICARE

## 2025-05-29 LAB
EKG ATRIAL RATE: 66 BPM
EKG P AXIS: 87 DEGREES
EKG P-R INTERVAL: 164 MS
EKG Q-T INTERVAL: 412 MS
EKG QRS DURATION: 80 MS
EKG QTC CALCULATION (BAZETT): 431 MS
EKG R AXIS: 78 DEGREES
EKG T AXIS: 78 DEGREES
EKG VENTRICULAR RATE: 66 BPM

## 2025-05-29 PROCEDURE — 93010 ELECTROCARDIOGRAM REPORT: CPT | Performed by: INTERNAL MEDICINE

## 2025-05-30 ENCOUNTER — TELEPHONE (OUTPATIENT)
Age: 65
End: 2025-05-30

## 2025-05-30 ENCOUNTER — HOSPITAL ENCOUNTER (OUTPATIENT)
Dept: INFUSION THERAPY | Age: 65
Discharge: HOME | End: 2025-05-30
Payer: MEDICARE

## 2025-05-30 ENCOUNTER — HOSPITAL ENCOUNTER (OUTPATIENT)
Dept: RADIATION ONCOLOGY | Age: 65
Discharge: HOME | End: 2025-05-30
Payer: MEDICARE

## 2025-05-30 ENCOUNTER — CLINICAL DOCUMENTATION (OUTPATIENT)
Age: 65
End: 2025-05-30

## 2025-05-30 ENCOUNTER — OFFICE VISIT (OUTPATIENT)
Age: 65
End: 2025-05-30

## 2025-05-30 VITALS
TEMPERATURE: 98.2 F | HEART RATE: 98 BPM | RESPIRATION RATE: 16 BRPM | WEIGHT: 129.8 LBS | SYSTOLIC BLOOD PRESSURE: 113 MMHG | BODY MASS INDEX: 21.37 KG/M2 | DIASTOLIC BLOOD PRESSURE: 82 MMHG

## 2025-05-30 VITALS
WEIGHT: 129.8 LBS | RESPIRATION RATE: 16 BRPM | SYSTOLIC BLOOD PRESSURE: 113 MMHG | DIASTOLIC BLOOD PRESSURE: 82 MMHG | BODY MASS INDEX: 21.37 KG/M2 | TEMPERATURE: 98.2 F | HEART RATE: 98 BPM

## 2025-05-30 DIAGNOSIS — C32.1 MALIGNANT NEOPLASM OF SUPRAGLOTTIS (HCC): Primary | ICD-10-CM

## 2025-05-30 DIAGNOSIS — D70.1 CHEMOTHERAPY INDUCED NEUTROPENIA: ICD-10-CM

## 2025-05-30 DIAGNOSIS — T45.1X5A CHEMOTHERAPY INDUCED NEUTROPENIA: ICD-10-CM

## 2025-05-30 DIAGNOSIS — Z51.11 CHEMOTHERAPY MANAGEMENT, ENCOUNTER FOR: ICD-10-CM

## 2025-05-30 LAB
ALBUMIN SERPL-MCNC: 3.6 G/DL (ref 3.5–5.2)
ALBUMIN/GLOB SERPL: 1.2 {RATIO} (ref 1–2.5)
ALP SERPL-CCNC: 74 U/L (ref 40–129)
ALT SERPL-CCNC: 12 U/L (ref 10–50)
ANION GAP SERPL CALCULATED.3IONS-SCNC: 12 MMOL/L (ref 9–16)
AST SERPL-CCNC: 22 U/L (ref 10–50)
BASOPHILS # BLD: 0.02 K/UL (ref 0–0.2)
BASOPHILS NFR BLD: 1 % (ref 0–2)
BILIRUB SERPL-MCNC: <0.2 MG/DL (ref 0–1.2)
BUN SERPL-MCNC: 18 MG/DL (ref 8–23)
CALCIUM SERPL-MCNC: 8.5 MG/DL (ref 8.6–10.4)
CHLORIDE SERPL-SCNC: 97 MMOL/L (ref 98–107)
CO2 SERPL-SCNC: 25 MMOL/L (ref 20–31)
CREAT SERPL-MCNC: 1.1 MG/DL (ref 0.7–1.2)
EOSINOPHIL # BLD: 0.05 K/UL (ref 0–0.4)
EOSINOPHILS RELATIVE PERCENT: 2 % (ref 1–4)
ERYTHROCYTE [DISTWIDTH] IN BLOOD BY AUTOMATED COUNT: 13.6 % (ref 12.5–15.4)
GFR, ESTIMATED: 74 ML/MIN/1.73M2
GLUCOSE SERPL-MCNC: 130 MG/DL (ref 74–99)
HCT VFR BLD AUTO: 22.3 % (ref 41–53)
HGB BLD-MCNC: 7.4 G/DL (ref 13.5–17.5)
LYMPHOCYTES NFR BLD: 0.25 K/UL (ref 1–4.8)
LYMPHOCYTES RELATIVE PERCENT: 11 % (ref 24–44)
MAGNESIUM SERPL-MCNC: 1.6 MG/DL (ref 1.6–2.4)
MCH RBC QN AUTO: 30.8 PG (ref 26–34)
MCHC RBC AUTO-ENTMCNC: 33.3 G/DL (ref 31–37)
MCV RBC AUTO: 92.4 FL (ref 80–100)
MONOCYTES NFR BLD: 0.58 K/UL (ref 0.1–0.8)
MONOCYTES NFR BLD: 25 % (ref 1–7)
MORPHOLOGY: NORMAL
NEUTROPHILS NFR BLD: 61 % (ref 36–66)
NEUTS SEG NFR BLD: 1.4 K/UL (ref 1.8–7.7)
PHOSPHATE SERPL-MCNC: 2.6 MG/DL (ref 2.5–4.5)
PLATELET # BLD AUTO: 220 K/UL (ref 140–450)
PMV BLD AUTO: 8 FL (ref 6–12)
POTASSIUM SERPL-SCNC: 4.2 MMOL/L (ref 3.7–5.3)
PROT SERPL-MCNC: 6.5 G/DL (ref 6.6–8.7)
RBC # BLD AUTO: 2.42 M/UL (ref 4.5–5.9)
SODIUM SERPL-SCNC: 134 MMOL/L (ref 136–145)
WBC OTHER # BLD: 2.3 K/UL (ref 3.5–11)

## 2025-05-30 PROCEDURE — 77386 HC NTSTY MODUL RAD TX DLVR CPLX: CPT | Performed by: RADIOLOGY

## 2025-05-30 PROCEDURE — 2580000003 HC RX 258: Performed by: INTERNAL MEDICINE

## 2025-05-30 PROCEDURE — 96361 HYDRATE IV INFUSION ADD-ON: CPT

## 2025-05-30 PROCEDURE — 84100 ASSAY OF PHOSPHORUS: CPT

## 2025-05-30 PROCEDURE — 85025 COMPLETE CBC W/AUTO DIFF WBC: CPT

## 2025-05-30 PROCEDURE — 6360000002 HC RX W HCPCS: Performed by: INTERNAL MEDICINE

## 2025-05-30 PROCEDURE — 80053 COMPREHEN METABOLIC PANEL: CPT

## 2025-05-30 PROCEDURE — 96365 THER/PROPH/DIAG IV INF INIT: CPT

## 2025-05-30 PROCEDURE — 83735 ASSAY OF MAGNESIUM: CPT

## 2025-05-30 RX ORDER — GUAIFENESIN 600 MG/1
600 TABLET, EXTENDED RELEASE ORAL 2 TIMES DAILY
Qty: 30 TABLET | Refills: 0 | Status: SHIPPED | OUTPATIENT
Start: 2025-05-30 | End: 2025-06-14

## 2025-05-30 RX ORDER — SODIUM CHLORIDE 9 MG/ML
5-250 INJECTION, SOLUTION INTRAVENOUS PRN
Status: DISCONTINUED | OUTPATIENT
Start: 2025-05-30 | End: 2025-05-31 | Stop reason: HOSPADM

## 2025-05-30 RX ORDER — MAGNESIUM SULFATE 1 G/100ML
1000 INJECTION INTRAVENOUS ONCE
Status: DISCONTINUED | OUTPATIENT
Start: 2025-05-30 | End: 2025-05-31 | Stop reason: HOSPADM

## 2025-05-30 RX ORDER — SODIUM CHLORIDE AND POTASSIUM CHLORIDE 150; 900 MG/100ML; MG/100ML
INJECTION, SOLUTION INTRAVENOUS CONTINUOUS
Status: DISCONTINUED | OUTPATIENT
Start: 2025-05-30 | End: 2025-05-30 | Stop reason: HOSPADM

## 2025-05-30 RX ORDER — MAGNESIUM SULFATE 1 G/100ML
1000 INJECTION INTRAVENOUS ONCE
Status: COMPLETED | OUTPATIENT
Start: 2025-05-30 | End: 2025-05-30

## 2025-05-30 RX ADMIN — SODIUM CHLORIDE 120 ML/HR: 0.9 INJECTION, SOLUTION INTRAVENOUS at 11:34

## 2025-05-30 RX ADMIN — SODIUM CHLORIDE AND POTASSIUM CHLORIDE: .9; .15 SOLUTION INTRAVENOUS at 11:33

## 2025-05-30 RX ADMIN — MAGNESIUM SULFATE HEPTAHYDRATE 1000 MG: 1 INJECTION, SOLUTION INTRAVENOUS at 11:36

## 2025-05-30 NOTE — PROGRESS NOTES
MD JOSE ANTONIO    This note is created with the assistance of a speech recognition program.  While intending to generate a document that actually reflects the content of the visit, the document can still have some errors including those of syntax and sound a like substitutions which may escape proof reading.  It such instances, actual meaning can be extrapolated by contextual diversion.

## 2025-05-30 NOTE — TELEPHONE ENCOUNTER
called to confirm transportation information for next week's treatment. Patient scheduled for rides Monday through Friday.

## 2025-05-30 NOTE — PROGRESS NOTES
Matt TAYLOR Mayco  5/30/2025  Wt Readings from Last 3 Encounters:   05/30/25 58.9 kg (129 lb 12.8 oz)   05/30/25 58.9 kg (129 lb 12.8 oz)   05/27/25 59 kg (130 lb 1.1 oz)     Body mass index is 21.37 kg/m².        Treatment Area:Larynx (with chemo)    Patient was seen today for weekly visit.     Comfort Alteration  Fatigue: Moderate    Mucous Membrane Alteration  Mucositis Due to Radiation: No  Thrush: No  Voice Changes:Yes- raspy    Nutritional Alteration-Using feeding tube 3 times/day with Boost  Anorexia: Yes  Nausea: No  Vomiting: No     Ventilation Alteration  Cough:Occasional    Skin Alteration   Sensation:intact    Radiation Dermatitis:  Intact [x]     Erythema  [x]     Discoloration  [x]    Rash []     Dry desquamation  []     Moist desquamation []       Emotional  Coping: effective      Injury, potential bleeding or infection: skin care BID reinforced    Lab Results   Component Value Date    WBC 2.3 (L) 05/30/2025    HGB 7.4 (L) 05/30/2025    HCT 22.3 (L) 05/30/2025     05/30/2025         /82   Pulse 98   Temp 98.2 °F (36.8 °C)   Resp 16   Wt 58.9 kg (129 lb 12.8 oz)   BMI 21.37 kg/m²                   Assessment/Plan: Patient was seen today for weekly visit. He is using his feeding tube with Boost 3 times per day.  States taking pain medication orally and having some pain with swallowing pills-discussed how to crush medications, dissolve with water, and put in feeding tube.  No signs of thrush.  States having lightheadedness and dizziness that is positional.  States doing 3-4 bottles of water per day with tube feedings.  Skin to his neck has mild erythema.  States not taking his blood pressure medication currently.  Patient wants to hold radiation treatment today and resume last 5 treatments tomorrow.  He is scheduled for hydration early next week as well.  Plan of care ongoing.  Mary Jane Al RN  
tenderness. Normal ROM.  NEUROLOGICAL: Alert and oriented. Strength and sensation intact bilaterally. No focal deficits.   PSYCH: Mood normal, behavior normal.      LABS:  WBC   Date Value Ref Range Status   05/30/2025 2.3 (L) 3.5 - 11.0 k/uL Final   05/28/2025 1.8 (L) 3.5 - 11.0 k/uL Final   05/27/2025 2.0 (L) 3.5 - 11.0 k/uL Final     Neutrophils Absolute   Date Value Ref Range Status   05/30/2025 1.40 (L) 1.8 - 7.7 k/uL Final   05/28/2025 1.14 (L) 1.8 - 7.7 k/uL Final   05/27/2025 1.40 (L) 1.8 - 7.7 k/uL Final   09/17/2018 5.98 1.50 - 8.10 k/uL Final     Hemoglobin   Date Value Ref Range Status   05/30/2025 7.4 (L) 13.5 - 17.5 g/dL Final   05/28/2025 7.6 (L) 13.5 - 17.5 g/dL Final   05/27/2025 7.8 (L) 13.5 - 17.5 g/dL Final     Platelets   Date Value Ref Range Status   05/30/2025 220 140 - 450 k/uL Final   05/28/2025 166 140 - 450 k/uL Final   05/27/2025 164 140 - 450 k/uL Final     Creatinine   Date Value Ref Range Status   05/30/2025 1.1 0.7 - 1.2 mg/dL Final   05/28/2025 1.1 0.7 - 1.2 mg/dL Final   05/27/2025 1.1 0.7 - 1.2 mg/dL Final       MEDICATIONS:    Current Outpatient Medications:     guaiFENesin (MUCINEX) 600 MG extended release tablet, Take 1 tablet by mouth 2 times daily for 15 days, Disp: 30 tablet, Rfl: 0    sucralfate (CARAFATE) 1 GM tablet, Take 1 tablet by mouth 4 times daily, Disp: 120 tablet, Rfl: 3    pantoprazole (PROTONIX) 40 MG tablet, Take 1 tablet by mouth daily, Disp: 30 tablet, Rfl: 1    ondansetron (ZOFRAN-ODT) 8 MG TBDP disintegrating tablet, Take 0.5 tablets by mouth every 8 hours as needed for Nausea or Vomiting, Disp: 90 tablet, Rfl: 3    betamethasone valerate (VALISONE) 0.1 % cream, Apply topically 2 times daily., Disp: 45 g, Rfl: 2    Garlic 100 MG TABS, Take 100 mg by mouth 2 times daily Last dose Monday 2/24/25, Disp: , Rfl:     famotidine (PEPCID) 40 MG tablet, Take 1 tablet by mouth daily, Disp: , Rfl:     acetaminophen (TYLENOL) 325 MG tablet, Take 2 tablets by mouth every

## 2025-05-30 NOTE — PROGRESS NOTES
[facility]- MEDICAL NUTRITION THERAPY     Visit Type: Follow-up     NUTRITION RECOMMENDATIONS / MONITORING / EVALUATION  Continue oral diet as tolerated/able  Continue to use Boost via PEG- switch formula to chocolate Boost VHC TID. Will send new Rx to DME.   Will conitnue to monitor TF/PO tolerance, adequacy of intake, weight and care plans.     Subjective/Current Data:  Matt Mao is a 64 y.o. 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 Pt sleepy and states he does not feel very well at present. Pt does report that he is still eating some by mouth but small amounts at each meal. Pt is giving 3 cans of Boost Plus/day via PEG tube- no GI complaints. Pt does c/o vanilla taste after giving feeding that he strongly dislikes- prefers chocolate formula. Vanilla flavor of Boost Plus is currently the only option through DME. Ensure Plus in chocolate is an option but patient states he prefers Boost over Ensure. Could switch to Boost VHC in chocolate- will send new Rx. Pt states he received Enfit tip syringes with his shipment of formula but needs the catheter tip syringes instead; will inform [facility]. A couple catheter tip syringes were provided today.  Addendum: spoke with [employer] representative who states they will ship out new syringes (catheter tip) to patient. Order for chocolate Boost VHC sent - pt eligible for new TF shipment on/around 6/23/25.     Objective Data:  Patient Active Problem List    Diagnosis Date Noted    Dehydration 05/27/2025    Malignant neoplasm of supraglottis (HCC) 03/13/2025    Laryngeal mass 02/21/2025     Anthropometric Measures:  Height: 5' 6\"  Weight: 129 lb 12.8 oz (58.9 kg)  Ideal Body Weight: 142 lb (64.5 kg)  BMI: 21.37 kg/m2 (Normal Weight)    Wt Readings from Last 20 Encounters:   05/30/25 58.9 kg (129 lb 12.8 oz)   05/27/25 59 kg (130 lb 1.1

## 2025-05-30 NOTE — PROGRESS NOTES
Patient arrives ambulatory for Cisplatin C1D29  Pt was discharged from the hospital for dehydration on 5/28.  He states that he still feels dizzy and lightheaded.  When asked if he was been getting radiation, he stated that he has put radiation on hold until he feels better.  Dr Mota updated and labs reviewed.  See his note.    Per Dr Mota ok to complete liter of NS with 20 KCL.    RN coordinated Hydration appt next week with his appt with Anh Ragsdale to help pt with transportation.    Pt discharged in stable condition  Next appt 6/3 for hydration and RN check

## 2025-05-30 NOTE — TELEPHONE ENCOUNTER
Instructions   from Dr. Chin Mota MD    Hold tx   Iv fluid 1 lit on Tuesday or Wednesday   Rv in 1 week for reassessment , will see upstairs with labs      Tx held  Fluid Tuesday 6/3/25  RV 6/6/25 during tx at 8 am

## 2025-05-30 NOTE — PATIENT INSTRUCTIONS
Hold tx   Iv fluid 1 lit on Tuesday or Wednesday   Rv in 1 week for reassessment , will see upstairs with labs

## 2025-05-31 NOTE — PROGRESS NOTES
larynx, clinical stage T3 N1 M0  Hoarseness of voice  Tobacco dependence alcohol dependence  Peripheral arterial disease  COPD    Plan:  I had a detailed discussion with the patient and personally went over results of lab work-up imaging studies and other relevant clinical data.  Toxicity check performed.  Labs adequate for treatment  Patient counseled on use of pain medication  Counseled on use of tube feeds encouraged to increase oral intake as tolerated  Proceed with cisplatin.  Continue monitor kidney function closely  Patient counseled tobacco cessation  Continue Magic mouthwash  Patient counseled cutting back on alcohol consumption  Case discussed with head and neck cancer navigator  NCCN guidelines were reviewed and discussed with the patient.  The diagnosis and care plan were discussed with the patient in detail. I discussed the natural history of the disease, prognosis, risks and goals of therapy and answered all the patients questions to the best of my ability.  Patient expressed understanding and was in agreement.      SHANDA BRADY MD    This note is created with the assistance of a speech recognition program.  While intending to generate a document that actually reflects the content of the visit, the document can still have some errors including those of syntax and sound a like substitutions which may escape proof reading.  It such instances, actual meaning can be extrapolated by contextual diversion.

## 2025-06-02 ENCOUNTER — TELEPHONE (OUTPATIENT)
Age: 65
End: 2025-06-02

## 2025-06-02 ENCOUNTER — TELEPHONE (OUTPATIENT)
Dept: PALLATIVE CARE | Age: 65
End: 2025-06-02

## 2025-06-02 ENCOUNTER — HOSPITAL ENCOUNTER (OUTPATIENT)
Dept: RADIATION ONCOLOGY | Age: 65
Discharge: HOME | End: 2025-06-02
Payer: MEDICARE

## 2025-06-02 PROCEDURE — 77386 HC NTSTY MODUL RAD TX DLVR CPLX: CPT | Performed by: RADIOLOGY

## 2025-06-02 NOTE — TELEPHONE ENCOUNTER
Called patient with reminder for upcoming Palliative Care Clinic appointment.  Reminded patient of appointment date,time and location. Confirmed that Tyler MAGAÑA has set up transportation for this week appointments.     [facility] Palliative Care Coordinator  Megan KON, RN  [facility] [phone]/ [facility] [phone]/ [facility] [phone]

## 2025-06-02 NOTE — PROGRESS NOTES
Palliative Care Consultation Note    Patient: Matt Mao  1960    Referring physician: Berenice att. providers found  Consulting nurse practitioner: Aaliyah Ragsdale       REASON FOR CONSULTATION:   Assist in symptom and pain control   Goals of care evaluation  Distress management  Facilitate communications  Non-pain symptoms:  Recommendations for the above    HISTORY OF PRESENT ILLNESS:   Matt Mao is a 64 y.o. male with a history of laryngeal mass, COPD, blood clots, HTN, PAD s/p surgery, and follows with Nolan Felix PA .  Palliative Care was consulted to help manage symptoms, facilitate communications and establish goals of care. Patient began symptoms of sore throat and hoarseness of voice. He was seen by PCP and referred to ENT. He had insurance issues and wasn't able to get in until 3/4/2025. Laryngoscopy showed supraglottic mass which was biopsied and came back SCC. He has history of alcohol and tobacco usage. He underwent PET scan that showed FDG avid mass in left supraglottic region as well as having focal activity and left level 4 lymph node suspicious for metastatic disease. Not was made for uptake in the distal esophagus and EGD recommended. Patient seen Dr. Hamlin on 3/11/25 for stage III disease and he was advised to proceed with definitive chemoradiation therapy and continues.     He was started on concurrent chemoradiation wit cisplatin 4/2025. Patent was seen by GI 4/7/25 and he refused EGD. He was seen in ER for lightheadedness 5/27 and has since been set up with hydration appointments.     Today, Mr. Mao is complaining of pain. The patient was evaluated and examined, family were also interviewed . Long discussion to address issues mentioned above.     SUBJECTIVE: The initial comprehensive symptom assessment is as follows:     Symptom Ratings:     Pain  8    Throat-voice hoarse - Norco 7.5 mg - 4 per day total. MMW PRN   Other Symptoms  0 (none) - 3 (severe)   Nausea

## 2025-06-03 ENCOUNTER — HOSPITAL ENCOUNTER (OUTPATIENT)
Dept: RADIATION ONCOLOGY | Age: 65
Discharge: HOME | End: 2025-06-03
Payer: MEDICARE

## 2025-06-03 ENCOUNTER — HOSPITAL ENCOUNTER (OUTPATIENT)
Dept: PHYSICAL THERAPY | Facility: CLINIC | Age: 65
Setting detail: THERAPIES SERIES
Discharge: HOME | End: 2025-06-03
Payer: MEDICARE

## 2025-06-03 ENCOUNTER — TELEPHONE (OUTPATIENT)
Age: 65
End: 2025-06-03

## 2025-06-03 ENCOUNTER — HOSPITAL ENCOUNTER (OUTPATIENT)
Dept: INFUSION THERAPY | Age: 65
Discharge: HOME | End: 2025-06-03
Payer: MEDICARE

## 2025-06-03 ENCOUNTER — INITIAL CONSULT (OUTPATIENT)
Dept: PALLATIVE CARE | Age: 65
End: 2025-06-03
Payer: MEDICARE

## 2025-06-03 VITALS — HEART RATE: 67 BPM | SYSTOLIC BLOOD PRESSURE: 125 MMHG | RESPIRATION RATE: 16 BRPM | DIASTOLIC BLOOD PRESSURE: 65 MMHG

## 2025-06-03 VITALS
HEIGHT: 67 IN | RESPIRATION RATE: 16 BRPM | SYSTOLIC BLOOD PRESSURE: 125 MMHG | WEIGHT: 130.9 LBS | HEART RATE: 67 BPM | DIASTOLIC BLOOD PRESSURE: 65 MMHG | TEMPERATURE: 98.1 F | BODY MASS INDEX: 20.55 KG/M2

## 2025-06-03 DIAGNOSIS — J38.7 LARYNGEAL MASS: Primary | ICD-10-CM

## 2025-06-03 DIAGNOSIS — Z51.5 PALLIATIVE CARE ENCOUNTER: ICD-10-CM

## 2025-06-03 DIAGNOSIS — R07.0 THROAT PAIN: ICD-10-CM

## 2025-06-03 DIAGNOSIS — Z71.89 GOALS OF CARE, COUNSELING/DISCUSSION: ICD-10-CM

## 2025-06-03 DIAGNOSIS — C32.1 MALIGNANT NEOPLASM OF SUPRAGLOTTIS (HCC): Primary | ICD-10-CM

## 2025-06-03 PROCEDURE — 99204 OFFICE O/P NEW MOD 45 MIN: CPT | Performed by: NURSE PRACTITIONER

## 2025-06-03 PROCEDURE — 96360 HYDRATION IV INFUSION INIT: CPT

## 2025-06-03 PROCEDURE — 2500000003 HC RX 250 WO HCPCS: Performed by: RADIOLOGY

## 2025-06-03 PROCEDURE — 6360000002 HC RX W HCPCS: Performed by: RADIOLOGY

## 2025-06-03 PROCEDURE — 77386 HC NTSTY MODUL RAD TX DLVR CPLX: CPT | Performed by: RADIOLOGY

## 2025-06-03 PROCEDURE — 97140 MANUAL THERAPY 1/> REGIONS: CPT

## 2025-06-03 PROCEDURE — 2580000003 HC RX 258: Performed by: INTERNAL MEDICINE

## 2025-06-03 PROCEDURE — 97110 THERAPEUTIC EXERCISES: CPT

## 2025-06-03 PROCEDURE — 3017F COLORECTAL CA SCREEN DOC REV: CPT | Performed by: NURSE PRACTITIONER

## 2025-06-03 PROCEDURE — 1036F TOBACCO NON-USER: CPT | Performed by: NURSE PRACTITIONER

## 2025-06-03 PROCEDURE — G8427 DOCREV CUR MEDS BY ELIG CLIN: HCPCS | Performed by: NURSE PRACTITIONER

## 2025-06-03 PROCEDURE — 1123F ACP DISCUSS/DSCN MKR DOCD: CPT | Performed by: NURSE PRACTITIONER

## 2025-06-03 PROCEDURE — G8420 CALC BMI NORM PARAMETERS: HCPCS | Performed by: NURSE PRACTITIONER

## 2025-06-03 RX ORDER — SODIUM CHLORIDE 9 MG/ML
INJECTION, SOLUTION INTRAVENOUS CONTINUOUS
Status: CANCELLED | OUTPATIENT
Start: 2025-06-09

## 2025-06-03 RX ORDER — 0.9 % SODIUM CHLORIDE 0.9 %
1000 INTRAVENOUS SOLUTION INTRAVENOUS ONCE
Status: CANCELLED
Start: 2025-06-09 | End: 2025-06-09

## 2025-06-03 RX ORDER — SODIUM CHLORIDE 0.9 % (FLUSH) 0.9 %
5-40 SYRINGE (ML) INJECTION PRN
Status: CANCELLED | OUTPATIENT
Start: 2025-06-09

## 2025-06-03 RX ORDER — HYDROCODONE BITARTRATE AND ACETAMINOPHEN 7.5; 325 MG/1; MG/1
1 TABLET ORAL EVERY 6 HOURS PRN
COMMUNITY
End: 2025-06-03 | Stop reason: SDUPTHER

## 2025-06-03 RX ORDER — ONDANSETRON 2 MG/ML
8 INJECTION INTRAMUSCULAR; INTRAVENOUS
Status: CANCELLED | OUTPATIENT
Start: 2025-06-09

## 2025-06-03 RX ORDER — FAMOTIDINE 10 MG/ML
20 INJECTION, SOLUTION INTRAVENOUS
Status: CANCELLED | OUTPATIENT
Start: 2025-06-09

## 2025-06-03 RX ORDER — 0.9 % SODIUM CHLORIDE 0.9 %
1000 INTRAVENOUS SOLUTION INTRAVENOUS ONCE
Status: COMPLETED | OUTPATIENT
Start: 2025-06-03 | End: 2025-06-03

## 2025-06-03 RX ORDER — EPINEPHRINE 1 MG/ML
0.3 INJECTION, SOLUTION, CONCENTRATE INTRAVENOUS PRN
Status: CANCELLED | OUTPATIENT
Start: 2025-06-09

## 2025-06-03 RX ORDER — DIPHENHYDRAMINE HYDROCHLORIDE 50 MG/ML
50 INJECTION, SOLUTION INTRAMUSCULAR; INTRAVENOUS
Status: CANCELLED | OUTPATIENT
Start: 2025-06-09

## 2025-06-03 RX ORDER — ACETAMINOPHEN 325 MG/1
650 TABLET ORAL
Status: CANCELLED | OUTPATIENT
Start: 2025-06-09

## 2025-06-03 RX ORDER — SODIUM CHLORIDE 0.9 % (FLUSH) 0.9 %
5-40 SYRINGE (ML) INJECTION PRN
Status: DISCONTINUED | OUTPATIENT
Start: 2025-06-03 | End: 2025-06-04 | Stop reason: HOSPADM

## 2025-06-03 RX ORDER — SODIUM CHLORIDE 9 MG/ML
5-250 INJECTION, SOLUTION INTRAVENOUS PRN
Status: CANCELLED | OUTPATIENT
Start: 2025-06-09

## 2025-06-03 RX ORDER — HYDROCODONE BITARTRATE AND ACETAMINOPHEN 7.5; 325 MG/1; MG/1
1 TABLET ORAL EVERY 6 HOURS PRN
Qty: 56 TABLET | Refills: 0 | Status: SHIPPED | OUTPATIENT
Start: 2025-06-03 | End: 2025-06-17

## 2025-06-03 RX ORDER — ALBUTEROL SULFATE 90 UG/1
4 INHALANT RESPIRATORY (INHALATION) PRN
Status: CANCELLED | OUTPATIENT
Start: 2025-06-09

## 2025-06-03 RX ORDER — HEPARIN 100 UNIT/ML
500 SYRINGE INTRAVENOUS PRN
Status: CANCELLED | OUTPATIENT
Start: 2025-06-09

## 2025-06-03 RX ORDER — HEPARIN 100 UNIT/ML
500 SYRINGE INTRAVENOUS PRN
Status: DISCONTINUED | OUTPATIENT
Start: 2025-06-03 | End: 2025-06-04 | Stop reason: HOSPADM

## 2025-06-03 RX ORDER — HYDROCORTISONE SODIUM SUCCINATE 100 MG/2ML
100 INJECTION INTRAMUSCULAR; INTRAVENOUS
Status: CANCELLED | OUTPATIENT
Start: 2025-06-09

## 2025-06-03 RX ADMIN — SODIUM CHLORIDE 1000 ML: 9 INJECTION, SOLUTION INTRAVENOUS at 11:57

## 2025-06-03 RX ADMIN — SODIUM CHLORIDE, PRESERVATIVE FREE 10 ML: 5 INJECTION INTRAVENOUS at 13:23

## 2025-06-03 RX ADMIN — HEPARIN 500 UNITS: 100 SYRINGE at 13:23

## 2025-06-03 NOTE — FLOWSHEET NOTE
[] [facility]  Outpatient Rehabilitation &  Therapy  [address]  [phone]  [phone] [] [facility]  Outpatient Rehabilitation &  Therapy  [address]  P: [phone]  F: [phone] [x] [facility]  Outpatient Rehabilitation &  Therapy  [address]  P: [phone]  F: [phone] [] [facility]  Outpatient Rehabilitation &  Therapy  [address]  [phone]  [phone] [] [facility]  Outpatient Rehabilitation &  Therapy  [address]   P: [phone]  F: [phone]  [] [facility]  Outpatient Rehabilitation &  Therapy  [address]  P: [phone]  F: [phone] [] [facility]  Outpatient Rehabilitation &  Therapy  [address]  P: [phone]  F: [phone] [] [facility]  Outpatient Rehabilitation &  Therapy  [address]  P: [phone]  F: [phone] [] [facility]  Outpatient Rehabilitation &  Therapy  [address]  P: [phone]  F: [phone]  [] [facility] Outpatient Rehabilitation &  Therapy  [address]  P: [phone]  F: [phone]     Physical Therapy Daily Treatment Note    Date:  6/3/2025  Patient Name:  Matt Mao    :  1960  MRN: [id number]  Physician: Shelley Finnegan          Insurance: [facility] MEDICARE DUAL, [facility] Medicaid visits BMN  Medical Diagnosis: C32.1 Malignant neoplasm of supraglottis         Rehab Codes:  25.611, 25.612, R53.0, L90.5, R29.3   Onset date: 3/14/25 script                Next Dr's appt.: ongoing     Visit# / total visits: ; Progress note for Medicare patient due at visit 10     Cancels/No Shows: 0    Subjective:  Pt reports he was hospitalized w/ dizziness last week, dehydration and constipation, feeling better today.

## 2025-06-03 NOTE — PROGRESS NOTES
Pt here for hydration.  Arrives ambulatory.  Pt complaining of nausea, dizziness.  Tx complete without incident.  Pt d/c'd in stable condition to palliative appointment.  Returns 6/6 for Cisplatin and MD visit.

## 2025-06-03 NOTE — PROGRESS NOTES
Roomed patient for palliative care visit.  Concerns voiced and documented in reason for visit in provider note.   Pain contract and opioid consents signed, copy given to patient and copy scanned into [employer].  Will need lab drug screen in future.    Follow up appointment given to patient.  AVS mailed to patient.      [facility] Palliative Care Coordinator  Megan KON, RN  [facility] [phone]/ [facility] [phone]/ [facility] [phone]

## 2025-06-03 NOTE — TELEPHONE ENCOUNTER
checked in with patient during infusion. Patient asking about who he sees later today. Per chart review patient to see Palliative Care for consult. Patient's treatment almost complete and Infusion Nurse states she can escort patient to waiting room for Palliative Care.

## 2025-06-04 ENCOUNTER — TELEPHONE (OUTPATIENT)
Age: 65
End: 2025-06-04

## 2025-06-04 ENCOUNTER — HOSPITAL ENCOUNTER (OUTPATIENT)
Dept: RADIATION ONCOLOGY | Age: 65
Discharge: HOME | End: 2025-06-04
Payer: MEDICARE

## 2025-06-04 ENCOUNTER — CLINICAL DOCUMENTATION (OUTPATIENT)
Age: 65
End: 2025-06-04

## 2025-06-04 VITALS
RESPIRATION RATE: 16 BRPM | OXYGEN SATURATION: 98 % | HEART RATE: 93 BPM | WEIGHT: 126.2 LBS | TEMPERATURE: 97.5 F | DIASTOLIC BLOOD PRESSURE: 68 MMHG | BODY MASS INDEX: 19.77 KG/M2 | SYSTOLIC BLOOD PRESSURE: 140 MMHG

## 2025-06-04 PROCEDURE — 77386 HC NTSTY MODUL RAD TX DLVR CPLX: CPT | Performed by: RADIOLOGY

## 2025-06-04 ASSESSMENT — PAIN DESCRIPTION - LOCATION: LOCATION: THROAT

## 2025-06-04 ASSESSMENT — PAIN SCALES - GENERAL: PAINLEVEL_OUTOF10: 8

## 2025-06-04 NOTE — PROGRESS NOTES
Matt TAYLOR Mayco  6/4/2025  Wt Readings from Last 3 Encounters:   06/04/25 57.2 kg (126 lb 3.2 oz)   06/03/25 59.4 kg (130 lb 14.4 oz)   05/30/25 58.9 kg (129 lb 12.8 oz)     Body mass index is 19.77 kg/m².        Treatment Area:Larynx (with chemo)    Patient was seen today for weekly visit.     Comfort Alteration  Fatigue: Moderate    Mucous Membrane Alteration  Mucositis Due to Radiation: Yes  Thrush: No  Voice Changes:Yes- raspy    Nutritional Alteration-Using feeding tube 3 times/day with Boost  Anorexia: Yes  Nausea: No  Vomiting: No     Ventilation Alteration  Cough:Occasional    Skin Alteration   Sensation:intact    Radiation Dermatitis:  Intact [x]     Erythema  [x]     Discoloration  [x]    Rash []     Dry desquamation  []     Moist desquamation []       Emotional  Coping: effective      Injury, potential bleeding or infection:   Lab Results   Component Value Date    WBC 2.3 (L) 05/30/2025    HGB 7.4 (L) 05/30/2025    HCT 22.3 (L) 05/30/2025     05/30/2025         BP (!) 140/68   Pulse 93   Temp 97.5 °F (36.4 °C) (Temporal)   Resp 16   Wt 57.2 kg (126 lb 3.2 oz)   SpO2 98%   BMI 19.77 kg/m²      Pain Assessment: 0-10  Pain Level: 8         Assessment/Plan: Patient was seen today for weekly visit.  He reports feeling dizzy and light headed upon standing.  He received hydration yesterday and offered this again today and pt declined.  Encouraged to increase to 4 Boosts per day and pt declines stating he tastes this coming up his throat.  He declines to take Carafate and Protonix.  He states he is eating things like waffles, chicken nuggets, waffle fries.  No signs of thrush.  States pain medication working for pain.  Patient hemoglobin continues to run low.  Dr. Finnegan updated of assessment.  Patient completes radiation treatments tomorrow and likely to follow up in 1 month.  Plan of care ongoing.  Mary Jane Al RN  
Garlic 100 MG TABS, Take 100 mg by mouth 2 times daily Last dose Monday 2/24/25 (Patient not taking: Reported on 6/3/2025), Disp: , Rfl:     famotidine (PEPCID) 40 MG tablet, Take 1 tablet by mouth daily (Patient not taking: Reported on 6/3/2025), Disp: , Rfl:     acetaminophen (TYLENOL) 325 MG tablet, Take 2 tablets by mouth every 6 hours as needed for Pain Alternate with Motrin so that you are taking something for pain every 3 hours (Patient not taking: Reported on 6/3/2025), Disp: 120 tablet, Rfl: 1    amLODIPine (NORVASC) 5 MG tablet, Take 2 tablets by mouth daily (Patient not taking: Reported on 6/3/2025), Disp: , Rfl:     lisinopril (PRINIVIL;ZESTRIL) 5 MG tablet, Take 1 tablet by mouth daily (Patient not taking: Reported on 6/3/2025), Disp: , Rfl:     Magic Mouthwash (MIRACLE MOUTHWASH), Swish and swallow 5 mLs 3 times daily as needed for Pain Please refer to pharmacist recommendations for concentrations of above additives.  I defer this decision to the practicing pharmacist (Patient not taking: Reported on 6/3/2025), Disp: 473 mL, Rfl: 1    aspirin (DEIRDRE ASPIRIN) 325 MG tablet, Patient states takes a full 325 mg ASA (Patient not taking: Reported on 6/3/2025), Disp: , Rfl:     Emollient (CERAVE) CREA, , Disp: , Rfl:     ipratropium (ATROVENT HFA) 17 MCG/ACT inhaler, , Disp: , Rfl:     albuterol sulfate HFA (PROAIR HFA) 108 (90 Base) MCG/ACT inhaler, ProAir HFA 90 mcg/actuation aerosol inhaler  INHALE 2 PUFFS BY MOUTH EVERY 4 HOURS AS NEEDED, Disp: , Rfl:     potassium gluconate 550 (90 K) MG TABS tablet, , Disp: , Rfl:     atorvastatin (LIPITOR) 80 MG tablet, 1 tablet (Patient not taking: Reported on 6/3/2025), Disp: , Rfl:       ASSESSMENT PLAN:   Treatment setup and plan reviewed. Port images/CBCT images reviewed. Appropriate laboratory work was reviewed. Treatment side effects and toxicities reviewed with the patient, and appropriate management was advised. Will continue radiation treatment as planned,

## 2025-06-04 NOTE — TELEPHONE ENCOUNTER
set up transportation for appointment 6/10 through insurance provider. Patient updated.     Transportation details:  [facility] [phone]   10:00am  Confirmation [id number]  Return ride 12:00pm

## 2025-06-04 NOTE — PROGRESS NOTES
[facility]- MEDICAL NUTRITION THERAPY     Visit Type: Follow-up     NUTRITION RECOMMENDATIONS / MONITORING / EVALUATION  Continue oral diet as tolerated/able  Continue to use Boost via PEG- switch formula to chocolate Boost VHC TID. New Rx sent to DME on 5/30/25.   Will conitnue to monitor TF/PO tolerance, adequacy of intake, weight and care plans    Subjective/Current Data:  Matt Mao is a 64 y.o. 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 Chart reviewed and met with patient after radiation appt. Pt reports he is still eating some by mouth and also using PEG tube. Continues to give 3 bottles of Boost Plus/day via PEG. Cannot do more as he dislikes taste of vanilla that he gets after giving bolus. Notified pt Rx has been sent for change to Boost VHC in chocolate- will be able to get with next shipment. Pt happy with this change. Pt states he has been giving/drinking daily avg of 4-5 bottles of water/day in addition to Boost. Pt states he is still feeling lightheaded at times despite staying well hydrated; states physician is aware.     Objective Data:  Patient Active Problem List    Diagnosis Date Noted    Dehydration 05/27/2025    Malignant neoplasm of supraglottis (HCC) 03/13/2025    Laryngeal mass 02/21/2025     Anthropometric Measures:  Height: 5' 6\"  Weight: 126 lb 3.2 oz (57.2 kg)  Ideal Body Weight: 142 lb (64.5 kg)  BMI: 19.77 kg/m2 (Normal Weight)    Wt Readings from Last 20 Encounters:   06/04/25 57.2 kg (126 lb 3.2 oz)   06/03/25 59.4 kg (130 lb 14.4 oz)   05/30/25 58.9 kg (129 lb 12.8 oz)   05/30/25 58.9 kg (129 lb 12.8 oz)   05/27/25 59 kg (130 lb 1.1 oz)   05/23/25 60 kg (132 lb 3.2 oz)   05/21/25 59.2 kg (130 lb 9.6 oz)   05/16/25 60 kg (132 lb 3.2 oz)   05/14/25 59.2 kg (130 lb 9.6 oz)   05/09/25 60.8 kg (134 lb)   05/07/25 59.2 kg (130 lb 9.6 oz)   05/05/25 59.4 kg (131 lb)   05/02/25 59.4 kg (131 lb)   04/30/25

## 2025-06-05 ENCOUNTER — APPOINTMENT (OUTPATIENT)
Dept: RADIATION ONCOLOGY | Age: 65
End: 2025-06-05
Payer: MEDICARE

## 2025-06-05 ENCOUNTER — TELEPHONE (OUTPATIENT)
Dept: ONCOLOGY | Age: 65
End: 2025-06-05

## 2025-06-05 NOTE — TELEPHONE ENCOUNTER
Received phone call from pt. His treatment is cancelled for today due to the machine being down. SW cancelled transportation.    Received phone call from Shamir with radiation and pt appt will also be cancelled for tomorrow due to the machine being down. ARCHANA attempted to cancel his ride for tomorrow but unable to get in touch with [employer] representative. ARCHANA will call tomorrow morning to get ride cancelled.

## 2025-06-06 ENCOUNTER — APPOINTMENT (OUTPATIENT)
Dept: RADIATION ONCOLOGY | Age: 65
End: 2025-06-06
Payer: MEDICARE

## 2025-06-06 ENCOUNTER — TELEPHONE (OUTPATIENT)
Dept: ONCOLOGY | Age: 65
End: 2025-06-06

## 2025-06-06 ENCOUNTER — TELEPHONE (OUTPATIENT)
Age: 65
End: 2025-06-06

## 2025-06-06 ENCOUNTER — HOSPITAL ENCOUNTER (OUTPATIENT)
Dept: INFUSION THERAPY | Age: 65
Discharge: HOME | End: 2025-06-06
Payer: MEDICARE

## 2025-06-06 ENCOUNTER — CLINICAL DOCUMENTATION (OUTPATIENT)
Age: 65
End: 2025-06-06

## 2025-06-06 ENCOUNTER — OFFICE VISIT (OUTPATIENT)
Age: 65
End: 2025-06-06

## 2025-06-06 VITALS
OXYGEN SATURATION: 97 % | DIASTOLIC BLOOD PRESSURE: 64 MMHG | RESPIRATION RATE: 18 BRPM | BODY MASS INDEX: 20.11 KG/M2 | SYSTOLIC BLOOD PRESSURE: 126 MMHG | WEIGHT: 128.4 LBS | TEMPERATURE: 97.9 F | HEART RATE: 85 BPM

## 2025-06-06 DIAGNOSIS — C32.1 MALIGNANT NEOPLASM OF SUPRAGLOTTIS (HCC): Primary | ICD-10-CM

## 2025-06-06 DIAGNOSIS — Z51.11 CHEMOTHERAPY MANAGEMENT, ENCOUNTER FOR: ICD-10-CM

## 2025-06-06 LAB
ALBUMIN SERPL-MCNC: 3.8 G/DL (ref 3.5–5.2)
ALBUMIN/GLOB SERPL: 1.5 {RATIO} (ref 1–2.5)
ALP SERPL-CCNC: 81 U/L (ref 40–129)
ALT SERPL-CCNC: 11 U/L (ref 10–50)
ANION GAP SERPL CALCULATED.3IONS-SCNC: 11 MMOL/L (ref 9–16)
AST SERPL-CCNC: 21 U/L (ref 10–50)
BASOPHILS # BLD: 0 K/UL (ref 0–0.2)
BASOPHILS NFR BLD: 0 % (ref 0–2)
BILIRUB SERPL-MCNC: <0.2 MG/DL (ref 0–1.2)
BUN SERPL-MCNC: 16 MG/DL (ref 8–23)
CALCIUM SERPL-MCNC: 8.6 MG/DL (ref 8.6–10.4)
CHLORIDE SERPL-SCNC: 104 MMOL/L (ref 98–107)
CO2 SERPL-SCNC: 26 MMOL/L (ref 20–31)
CREAT SERPL-MCNC: 1 MG/DL (ref 0.7–1.2)
EOSINOPHIL # BLD: 0.1 K/UL (ref 0–0.4)
EOSINOPHILS RELATIVE PERCENT: 2 % (ref 1–4)
ERYTHROCYTE [DISTWIDTH] IN BLOOD BY AUTOMATED COUNT: 14.6 % (ref 12.5–15.4)
GFR, ESTIMATED: 84 ML/MIN/1.73M2
GLUCOSE SERPL-MCNC: 153 MG/DL (ref 74–99)
HCT VFR BLD AUTO: 22.3 % (ref 41–53)
HGB BLD-MCNC: 7.5 G/DL (ref 13.5–17.5)
LYMPHOCYTES NFR BLD: 0.15 K/UL (ref 1–4.8)
LYMPHOCYTES RELATIVE PERCENT: 3 % (ref 24–44)
MAGNESIUM SERPL-MCNC: 1.7 MG/DL (ref 1.6–2.4)
MCH RBC QN AUTO: 31.8 PG (ref 26–34)
MCHC RBC AUTO-ENTMCNC: 33.6 G/DL (ref 31–37)
MCV RBC AUTO: 94.8 FL (ref 80–100)
MONOCYTES NFR BLD: 0.49 K/UL (ref 0.1–0.8)
MONOCYTES NFR BLD: 10 % (ref 1–7)
MORPHOLOGY: NORMAL
NEUTROPHILS NFR BLD: 85 % (ref 36–66)
NEUTS SEG NFR BLD: 4.16 K/UL (ref 1.8–7.7)
PLATELET # BLD AUTO: 412 K/UL (ref 140–450)
PMV BLD AUTO: 7.9 FL (ref 6–12)
POTASSIUM SERPL-SCNC: 4.3 MMOL/L (ref 3.7–5.3)
PROT SERPL-MCNC: 6.4 G/DL (ref 6.6–8.7)
RBC # BLD AUTO: 2.36 M/UL (ref 4.5–5.9)
SODIUM SERPL-SCNC: 141 MMOL/L (ref 136–145)
WBC OTHER # BLD: 4.9 K/UL (ref 3.5–11)

## 2025-06-06 PROCEDURE — 80053 COMPREHEN METABOLIC PANEL: CPT

## 2025-06-06 PROCEDURE — 96375 TX/PRO/DX INJ NEW DRUG ADDON: CPT

## 2025-06-06 PROCEDURE — 96368 THER/DIAG CONCURRENT INF: CPT

## 2025-06-06 PROCEDURE — 83735 ASSAY OF MAGNESIUM: CPT

## 2025-06-06 PROCEDURE — 96366 THER/PROPH/DIAG IV INF ADDON: CPT

## 2025-06-06 PROCEDURE — 2500000003 HC RX 250 WO HCPCS: Performed by: INTERNAL MEDICINE

## 2025-06-06 PROCEDURE — 85025 COMPLETE CBC W/AUTO DIFF WBC: CPT

## 2025-06-06 PROCEDURE — 2580000003 HC RX 258: Performed by: INTERNAL MEDICINE

## 2025-06-06 PROCEDURE — 6360000002 HC RX W HCPCS: Performed by: INTERNAL MEDICINE

## 2025-06-06 PROCEDURE — 96367 TX/PROPH/DG ADDL SEQ IV INF: CPT

## 2025-06-06 PROCEDURE — 96413 CHEMO IV INFUSION 1 HR: CPT

## 2025-06-06 RX ORDER — SODIUM CHLORIDE 0.9 % (FLUSH) 0.9 %
5-40 SYRINGE (ML) INJECTION PRN
Status: DISCONTINUED | OUTPATIENT
Start: 2025-06-06 | End: 2025-06-07 | Stop reason: HOSPADM

## 2025-06-06 RX ORDER — MAGNESIUM SULFATE 1 G/100ML
1000 INJECTION INTRAVENOUS ONCE
Status: COMPLETED | OUTPATIENT
Start: 2025-06-06 | End: 2025-06-06

## 2025-06-06 RX ORDER — MIDODRINE HYDROCHLORIDE 5 MG/1
5 TABLET ORAL 3 TIMES DAILY
Qty: 90 TABLET | Refills: 0 | Status: SHIPPED | OUTPATIENT
Start: 2025-06-06

## 2025-06-06 RX ORDER — DEXAMETHASONE SODIUM PHOSPHATE 10 MG/ML
10 INJECTION, SOLUTION INTRAMUSCULAR; INTRAVENOUS ONCE
Status: COMPLETED | OUTPATIENT
Start: 2025-06-06 | End: 2025-06-06

## 2025-06-06 RX ORDER — SODIUM CHLORIDE 9 MG/ML
5-250 INJECTION, SOLUTION INTRAVENOUS PRN
Status: DISCONTINUED | OUTPATIENT
Start: 2025-06-06 | End: 2025-06-07 | Stop reason: HOSPADM

## 2025-06-06 RX ORDER — PALONOSETRON 0.05 MG/ML
0.25 INJECTION, SOLUTION INTRAVENOUS ONCE
Status: COMPLETED | OUTPATIENT
Start: 2025-06-06 | End: 2025-06-06

## 2025-06-06 RX ORDER — SODIUM CHLORIDE AND POTASSIUM CHLORIDE 150; 900 MG/100ML; MG/100ML
INJECTION, SOLUTION INTRAVENOUS CONTINUOUS
Status: DISPENSED | OUTPATIENT
Start: 2025-06-06 | End: 2025-06-06

## 2025-06-06 RX ORDER — HEPARIN 100 UNIT/ML
500 SYRINGE INTRAVENOUS PRN
Status: DISCONTINUED | OUTPATIENT
Start: 2025-06-06 | End: 2025-06-07 | Stop reason: HOSPADM

## 2025-06-06 RX ADMIN — MAGNESIUM SULFATE HEPTAHYDRATE 1000 MG: 1 INJECTION, SOLUTION INTRAVENOUS at 11:45

## 2025-06-06 RX ADMIN — CISPLATIN 68 MG: 1 INJECTION, SOLUTION INTRAVENOUS at 10:29

## 2025-06-06 RX ADMIN — SODIUM CHLORIDE, PRESERVATIVE FREE 10 ML: 5 INJECTION INTRAVENOUS at 12:51

## 2025-06-06 RX ADMIN — SODIUM CHLORIDE, PRESERVATIVE FREE 10 ML: 5 INJECTION INTRAVENOUS at 07:48

## 2025-06-06 RX ADMIN — DEXAMETHASONE SODIUM PHOSPHATE 10 MG: 10 INJECTION, SOLUTION INTRAMUSCULAR; INTRAVENOUS at 09:33

## 2025-06-06 RX ADMIN — FOSAPREPITANT 150 MG: 150 INJECTION, POWDER, LYOPHILIZED, FOR SOLUTION INTRAVENOUS at 09:47

## 2025-06-06 RX ADMIN — POTASSIUM CHLORIDE AND SODIUM CHLORIDE: 900; 150 INJECTION, SOLUTION INTRAVENOUS at 08:23

## 2025-06-06 RX ADMIN — MAGNESIUM SULFATE HEPTAHYDRATE 1000 MG: 1 INJECTION, SOLUTION INTRAVENOUS at 08:22

## 2025-06-06 RX ADMIN — HEPARIN 500 UNITS: 100 SYRINGE at 12:51

## 2025-06-06 RX ADMIN — SODIUM CHLORIDE 120 ML/HR: 0.9 INJECTION, SOLUTION INTRAVENOUS at 08:21

## 2025-06-06 RX ADMIN — PALONOSETRON 0.25 MG: 0.05 INJECTION, SOLUTION INTRAVENOUS at 09:33

## 2025-06-06 NOTE — FLOWSHEET NOTE
06/06/25 0815   Vital Signs   Orthostatic B/P and Pulse? Yes   Blood Pressure Lying 118/59   Pulse Lying 85 PER MINUTE   Blood Pressure Sitting 114/58   Pulse Sitting 67 PER MINUTE   Blood Pressure Standing 94/55   Pulse Standing 61 PER MINUTE     Orthostatics done per Dr. Mota request.

## 2025-06-06 NOTE — PROGRESS NOTES
[facility]: Nutrition Note  Patient's nurse navigator alerted writer that patient states he never received catheter tip syringes from [facility]. Writer called [employer] and spoke with Melita. Melita reports their bolus packages include ENfit syringes with a package of transition connectors. Melita will send out a new package of connectors in case he didn't receive or overlooked them with the initial shipment. Melita also reports next shipment of new formula (Boost VHC in chocolate) is due to ship on/around 6/23/25.  Writer notified patient of above; pt will look in box and watch for new shipment of transition pieces. Pt also complains of mild redness around tube. Writer reviewed tube care with patient and encouraged pt to notify physician/triage RN with any changes or concerns.   Will continue to follow.       Huyen Garcia RD, LD, CNSC  Registered Dietitian  [facility]   [phone]

## 2025-06-06 NOTE — TELEPHONE ENCOUNTER
Transportation details: 06/09  [facility] [phone]   9:51 am  Confirmation [id number], [phone]  Call for return ride

## 2025-06-06 NOTE — TELEPHONE ENCOUNTER
Instructions   from Dr. Chin Mota MD    Tx today ( will be last chemo )   Iv fluid weekly   Rv in 2 week with cbc and cmp       Last tx today  IV fluids weekly  RV 6/20/25 at 12:45 pm with hydration to follow

## 2025-06-06 NOTE — TELEPHONE ENCOUNTER
Name: Matt Mao  : 1960  MRN: [id number]    Oncology Navigation Follow-Up Note    Contact Type:  Medical Oncology    Notes: Pt @ [facility] for Dr. Mota f/u & tx.  Dr. Mota alerted writer last chemotherapy today & will continue weekly IVF's.  Dr. Mota stated pt w/low b/p & orthostatic changes.  Dr. Mota stated midodrine e-scribed to pt's pharmacy & pt instructed to start today.  Met w/pt & pt's son in infusion cubicle.  Instructed pt & son to p/u midodrine prescription today & start once receive.  Pt & son verbalized understanding.  Pt inquired on syringes for PEG.  Instructed pt writer will update Huyen, [facility] dietician, & request f/u w/pt.  Pt verbalized understanding & denied questions/concerns.  Huyen updated.  Will continue to follow.      Electronically signed by Ember Dillon RN on 2025 at 9:04 AM

## 2025-06-06 NOTE — PROGRESS NOTES
Pt here for C1D36. Last chemo per Nakul  Arrives ambulatory.  C/o dizziness  Labs drawn from port, results reviewed.  Pt was seen by Dr. Mota, order rec'd to proceed with tx.  Tx complete without incident.  Pt d/c'd in stable condition.  Returns 6/11/25 for 1 unit PRBCs.

## 2025-06-06 NOTE — PROGRESS NOTES
(3/6/2023)    Received from [facility]    [place] [employer] - Occupational Stress Questionnaire     Feeling of Stress : Not at all   Intimate Partner Violence: Unknown (12/18/2024)    Received from [facility]    Humiliation, Afraid, Rape, and Kick questionnaire     Fear of Current or Ex-Partner: No   Housing Stability: Low Risk  (5/27/2025)    Housing Stability Vital Sign     Unable to Pay for Housing in the Last Year: No     Number of Times Moved in the Last Year: 0     Homeless in the Last Year: No     Current Medications:  Current Outpatient Medications   Medication Sig Dispense Refill    HYDROcodone-acetaminophen (NORCO) 7.5-325 MG per tablet Take 1 tablet by mouth every 6 hours as needed for Pain for up to 14 days. Max Daily Amount: 4 tablets 56 tablet 0    ondansetron (ZOFRAN-ODT) 8 MG TBDP disintegrating tablet Take 0.5 tablets by mouth every 8 hours as needed for Nausea or Vomiting 90 tablet 3    guaiFENesin (MUCINEX) 600 MG extended release tablet Take 1 tablet by mouth 2 times daily for 15 days (Patient not taking: Reported on 6/6/2025) 30 tablet 0    sucralfate (CARAFATE) 1 GM tablet Take 1 tablet by mouth 4 times daily (Patient not taking: Reported on 6/6/2025) 120 tablet 3    pantoprazole (PROTONIX) 40 MG tablet Take 1 tablet by mouth daily (Patient not taking: Reported on 6/6/2025) 30 tablet 1    betamethasone valerate (VALISONE) 0.1 % cream Apply topically 2 times daily. 45 g 2    Garlic 100 MG TABS Take 100 mg by mouth 2 times daily Last dose Monday 2/24/25 (Patient not taking: Reported on 6/6/2025)      famotidine (PEPCID) 40 MG tablet Take 1 tablet by mouth daily (Patient not taking: Reported on 6/6/2025)      acetaminophen (TYLENOL) 325 MG tablet Take 2 tablets by mouth every 6 hours as needed for Pain Alternate with Motrin so that you are taking something for pain every 3 hours (Patient not taking: Reported on 6/6/2025) 120 tablet 1    amLODIPine

## 2025-06-09 ENCOUNTER — HOSPITAL ENCOUNTER (OUTPATIENT)
Dept: RADIATION ONCOLOGY | Age: 65
Discharge: HOME | End: 2025-06-09
Payer: MEDICARE

## 2025-06-09 ENCOUNTER — TELEPHONE (OUTPATIENT)
Age: 65
End: 2025-06-09

## 2025-06-09 PROCEDURE — 77386 HC NTSTY MODUL RAD TX DLVR CPLX: CPT | Performed by: RADIOLOGY

## 2025-06-09 NOTE — TELEPHONE ENCOUNTER
Patient reached out with updated scheduled.  set up rides for 6/11 and 6/13 through insurance provider.     Transportation details:  [place] [phone]   10:00am 7:00am  Confirmation [id number]  Call for return ride

## 2025-06-10 ENCOUNTER — APPOINTMENT (OUTPATIENT)
Dept: RADIATION ONCOLOGY | Age: 65
End: 2025-06-10
Payer: MEDICARE

## 2025-06-10 ENCOUNTER — HOSPITAL ENCOUNTER (OUTPATIENT)
Dept: PHYSICAL THERAPY | Facility: CLINIC | Age: 65
Setting detail: THERAPIES SERIES
Discharge: HOME | End: 2025-06-10
Payer: MEDICARE

## 2025-06-10 PROCEDURE — 97140 MANUAL THERAPY 1/> REGIONS: CPT

## 2025-06-10 NOTE — FLOWSHEET NOTE
Other      Total Billable time 38min 3           Assessment: [x] Progressing toward goals. CGA requested to treatment room as pt states he feels unsteady. Dizziness resolved in supine position. Began with H&N PORi/manual as charted above to improve cervical/tissue mobility and lymphatic flow. Mild erythema along with mod dryness of the neck. Encouraged continuing to moisturize to improve skin integrity. Reduced tenderness along L SCM and scalenes, more sore upon the R UT today. Briefly reviewed HEP but held ex as pt is not feeling well today. Requests to cont in two weeks. Emphasized compliance to HEP, eating/drinking to keep the throat muscles active and use of SPC if needed to ensure pt safety.     [] No change.     [] Other:  [x] Patient would benefit from physical therapy in order to promote optimal healing and to reduce muscular/fascial restriction, improve body posture to decrease strain to surrounding tissue, restore cervical flexibility and postural strength for improvement in function and in quality of life       STG: (to be met in 12 treatments)              1. ? Pain: Stabilize cervical and throat pain and ensure optimal management of pain during all ADLs ( home, occupation, community and recreation)              2. ? ROM: Able to sustain cervical and B shoulder AROM while receiving radiation treatments               3. ? Strength: Incr R UE to grossly 5/5 and demonstrate good scapular/postural stability              4. ? Function: Pt to report improved sleep, maintain ability to complete ADL's, lift/carry and complete household chores w/o difficulty    5.  Independent with Home Exercise Program program as demonstrated by performance with correct form without cues.         LTG: (to be met in 18 treatments)        1. Decr BFI score by 1+ for improved erma to activity and decr UW-QOL score by 20% for overall improved function        2. Optimize cervical and bilateral shoulder AROM for functional activity

## 2025-06-11 ENCOUNTER — HOSPITAL ENCOUNTER (OUTPATIENT)
Dept: INFUSION THERAPY | Age: 65
Discharge: HOME | End: 2025-06-11
Payer: MEDICARE

## 2025-06-11 ENCOUNTER — CLINICAL DOCUMENTATION (OUTPATIENT)
Dept: RADIATION ONCOLOGY | Age: 65
End: 2025-06-11

## 2025-06-11 VITALS
DIASTOLIC BLOOD PRESSURE: 64 MMHG | HEART RATE: 59 BPM | TEMPERATURE: 98.1 F | OXYGEN SATURATION: 94 % | SYSTOLIC BLOOD PRESSURE: 152 MMHG | RESPIRATION RATE: 18 BRPM

## 2025-06-11 DIAGNOSIS — C32.1 MALIGNANT NEOPLASM OF SUPRAGLOTTIS (HCC): Primary | ICD-10-CM

## 2025-06-11 LAB
ALBUMIN SERPL-MCNC: 3.7 G/DL (ref 3.5–5.2)
ALBUMIN/GLOB SERPL: 1.4 {RATIO} (ref 1–2.5)
ALP SERPL-CCNC: 79 U/L (ref 40–129)
ALT SERPL-CCNC: 13 U/L (ref 10–50)
ANION GAP SERPL CALCULATED.3IONS-SCNC: 11 MMOL/L (ref 9–16)
AST SERPL-CCNC: 22 U/L (ref 10–50)
BASOPHILS # BLD: 0 K/UL (ref 0–0.2)
BASOPHILS NFR BLD: 1 % (ref 0–2)
BILIRUB SERPL-MCNC: 0.2 MG/DL (ref 0–1.2)
BUN SERPL-MCNC: 23 MG/DL (ref 8–23)
CALCIUM SERPL-MCNC: 8.1 MG/DL (ref 8.6–10.4)
CHLORIDE SERPL-SCNC: 105 MMOL/L (ref 98–107)
CO2 SERPL-SCNC: 24 MMOL/L (ref 20–31)
CREAT SERPL-MCNC: 1 MG/DL (ref 0.7–1.2)
EOSINOPHIL # BLD: 0.1 K/UL (ref 0–0.4)
EOSINOPHILS RELATIVE PERCENT: 1 % (ref 1–4)
ERYTHROCYTE [DISTWIDTH] IN BLOOD BY AUTOMATED COUNT: 16.7 % (ref 12.5–15.4)
GFR, ESTIMATED: 84 ML/MIN/1.73M2
GLUCOSE SERPL-MCNC: 75 MG/DL (ref 74–99)
HCT VFR BLD AUTO: 26.4 % (ref 41–53)
HGB BLD-MCNC: 8.7 G/DL (ref 13.5–17.5)
LYMPHOCYTES NFR BLD: 0.5 K/UL (ref 1–4.8)
LYMPHOCYTES RELATIVE PERCENT: 10 % (ref 24–44)
MCH RBC QN AUTO: 30.1 PG (ref 26–34)
MCHC RBC AUTO-ENTMCNC: 32.9 G/DL (ref 31–37)
MCV RBC AUTO: 91.5 FL (ref 80–100)
MONOCYTES NFR BLD: 0.6 K/UL (ref 0.1–1.2)
MONOCYTES NFR BLD: 12 % (ref 2–11)
NEUTROPHILS NFR BLD: 76 % (ref 36–66)
NEUTS SEG NFR BLD: 3.6 K/UL (ref 1.8–7.7)
PLATELET # BLD AUTO: 369 K/UL (ref 140–450)
PMV BLD AUTO: 8 FL (ref 6–12)
POTASSIUM SERPL-SCNC: 4.3 MMOL/L (ref 3.7–5.3)
PROT SERPL-MCNC: 6.3 G/DL (ref 6.6–8.7)
RBC # BLD AUTO: 2.88 M/UL (ref 4.5–5.9)
SODIUM SERPL-SCNC: 140 MMOL/L (ref 136–145)
WBC OTHER # BLD: 4.7 K/UL (ref 3.5–11)

## 2025-06-11 PROCEDURE — 86850 RBC ANTIBODY SCREEN: CPT

## 2025-06-11 PROCEDURE — 36430 TRANSFUSION BLD/BLD COMPNT: CPT

## 2025-06-11 PROCEDURE — 86923 COMPATIBILITY TEST ELECTRIC: CPT

## 2025-06-11 PROCEDURE — P9016 RBC LEUKOCYTES REDUCED: HCPCS

## 2025-06-11 PROCEDURE — 86900 BLOOD TYPING SEROLOGIC ABO: CPT

## 2025-06-11 PROCEDURE — 6360000002 HC RX W HCPCS: Performed by: RADIOLOGY

## 2025-06-11 PROCEDURE — 85025 COMPLETE CBC W/AUTO DIFF WBC: CPT

## 2025-06-11 PROCEDURE — 2580000003 HC RX 258: Performed by: INTERNAL MEDICINE

## 2025-06-11 PROCEDURE — 80053 COMPREHEN METABOLIC PANEL: CPT

## 2025-06-11 PROCEDURE — 2500000003 HC RX 250 WO HCPCS: Performed by: RADIOLOGY

## 2025-06-11 PROCEDURE — 96361 HYDRATE IV INFUSION ADD-ON: CPT

## 2025-06-11 PROCEDURE — 96360 HYDRATION IV INFUSION INIT: CPT

## 2025-06-11 PROCEDURE — 86901 BLOOD TYPING SEROLOGIC RH(D): CPT

## 2025-06-11 RX ORDER — SODIUM CHLORIDE 0.9 % (FLUSH) 0.9 %
5-40 SYRINGE (ML) INJECTION PRN
Status: CANCELLED | OUTPATIENT
Start: 2025-06-16

## 2025-06-11 RX ORDER — HEPARIN 100 UNIT/ML
500 SYRINGE INTRAVENOUS PRN
Status: CANCELLED | OUTPATIENT
Start: 2025-06-16

## 2025-06-11 RX ORDER — 0.9 % SODIUM CHLORIDE 0.9 %
1000 INTRAVENOUS SOLUTION INTRAVENOUS ONCE
Status: CANCELLED
Start: 2025-06-16 | End: 2025-06-16

## 2025-06-11 RX ORDER — SODIUM CHLORIDE 0.9 % (FLUSH) 0.9 %
5-40 SYRINGE (ML) INJECTION PRN
Status: DISCONTINUED | OUTPATIENT
Start: 2025-06-11 | End: 2025-06-12 | Stop reason: HOSPADM

## 2025-06-11 RX ORDER — DIPHENHYDRAMINE HYDROCHLORIDE 50 MG/ML
50 INJECTION, SOLUTION INTRAMUSCULAR; INTRAVENOUS
OUTPATIENT
Start: 2025-06-16

## 2025-06-11 RX ORDER — 0.9 % SODIUM CHLORIDE 0.9 %
1000 INTRAVENOUS SOLUTION INTRAVENOUS ONCE
Status: COMPLETED | OUTPATIENT
Start: 2025-06-11 | End: 2025-06-11

## 2025-06-11 RX ORDER — SODIUM CHLORIDE 9 MG/ML
INJECTION, SOLUTION INTRAVENOUS PRN
Status: COMPLETED | OUTPATIENT
Start: 2025-06-11 | End: 2025-06-11

## 2025-06-11 RX ORDER — ACETAMINOPHEN 325 MG/1
650 TABLET ORAL
OUTPATIENT
Start: 2025-06-16

## 2025-06-11 RX ORDER — ALBUTEROL SULFATE 90 UG/1
4 INHALANT RESPIRATORY (INHALATION) PRN
OUTPATIENT
Start: 2025-06-16

## 2025-06-11 RX ORDER — SODIUM CHLORIDE 9 MG/ML
INJECTION, SOLUTION INTRAVENOUS CONTINUOUS
OUTPATIENT
Start: 2025-06-16

## 2025-06-11 RX ORDER — ONDANSETRON 2 MG/ML
8 INJECTION INTRAMUSCULAR; INTRAVENOUS
OUTPATIENT
Start: 2025-06-16

## 2025-06-11 RX ORDER — HYDROCORTISONE SODIUM SUCCINATE 100 MG/2ML
100 INJECTION INTRAMUSCULAR; INTRAVENOUS
OUTPATIENT
Start: 2025-06-16

## 2025-06-11 RX ORDER — SODIUM CHLORIDE 9 MG/ML
5-250 INJECTION, SOLUTION INTRAVENOUS PRN
OUTPATIENT
Start: 2025-06-16

## 2025-06-11 RX ORDER — EPINEPHRINE 1 MG/ML
0.3 INJECTION, SOLUTION, CONCENTRATE INTRAVENOUS PRN
OUTPATIENT
Start: 2025-06-16

## 2025-06-11 RX ORDER — HEPARIN 100 UNIT/ML
500 SYRINGE INTRAVENOUS PRN
Status: DISCONTINUED | OUTPATIENT
Start: 2025-06-11 | End: 2025-06-12 | Stop reason: HOSPADM

## 2025-06-11 RX ORDER — FAMOTIDINE 10 MG/ML
20 INJECTION, SOLUTION INTRAVENOUS
OUTPATIENT
Start: 2025-06-16

## 2025-06-11 RX ADMIN — SODIUM CHLORIDE 1000 ML: 0.9 INJECTION, SOLUTION INTRAVENOUS at 10:51

## 2025-06-11 RX ADMIN — SODIUM CHLORIDE: 0.9 INJECTION, SOLUTION INTRAVENOUS at 12:00

## 2025-06-11 RX ADMIN — SODIUM CHLORIDE, PRESERVATIVE FREE 10 ML: 5 INJECTION INTRAVENOUS at 10:48

## 2025-06-11 RX ADMIN — HEPARIN 500 UNITS: 100 SYRINGE at 14:16

## 2025-06-11 RX ADMIN — SODIUM CHLORIDE, PRESERVATIVE FREE 10 ML: 5 INJECTION INTRAVENOUS at 14:16

## 2025-06-11 NOTE — PROGRESS NOTES
Pt here for hydration + 1 unit PRBCs  Arrives ambulatory.  Denies any new complaints.  Labs drawn from port, results reviewed.  Blood consent obtained  Tx complete without incident.  Pt d/c'd in stable condition.  Returns 6/20/25 for office visit w/ roderick.

## 2025-06-11 NOTE — PROGRESS NOTES
taste, grade 1 radiation dermatitis in the neck.  All symptoms were managed with supportive care.  Patient is to return to the radiation clinic in 1 month for his first posttreatment follow-up.  He will have a posttreatment PET scan to be done 12 weeks after completion of chemoRT.    Electronically signed by Shelley Finnegan MD on 6/11/2025 at 8:36 AM

## 2025-06-12 ENCOUNTER — TELEPHONE (OUTPATIENT)
Age: 65
End: 2025-06-12

## 2025-06-12 LAB
ABO/RH: NORMAL
ANTIBODY SCREEN: NEGATIVE
ARM BAND NUMBER: NORMAL
BLOOD BANK BLOOD PRODUCT EXPIRATION DATE: NORMAL
BLOOD BANK DISPENSE STATUS: NORMAL
BLOOD BANK ISBT PRODUCT BLOOD TYPE: 5100
BLOOD BANK PRODUCT CODE: NORMAL
BLOOD BANK SAMPLE EXPIRATION: NORMAL
BLOOD BANK UNIT TYPE AND RH: NORMAL
BPU ID: NORMAL
COMPONENT: NORMAL
CROSSMATCH RESULT: NORMAL
TRANSFUSION STATUS: NORMAL
UNIT DIVISION: 0
UNIT ISSUE DATE/TIME: NORMAL

## 2025-06-16 ENCOUNTER — TELEPHONE (OUTPATIENT)
Age: 65
End: 2025-06-16

## 2025-06-16 NOTE — TELEPHONE ENCOUNTER
set up rides through insurance provider for 6/20 and 6/24. Patient updated.     Transportation details:  [place] [phone]   11:45am 10:00am  Confirmation [id number]  Call for return ride, return ride 12:15pm

## 2025-06-20 ENCOUNTER — HOSPITAL ENCOUNTER (OUTPATIENT)
Dept: INFUSION THERAPY | Age: 65
Discharge: HOME | End: 2025-06-20

## 2025-06-20 ENCOUNTER — TELEPHONE (OUTPATIENT)
Age: 65
End: 2025-06-20

## 2025-06-20 ENCOUNTER — OFFICE VISIT (OUTPATIENT)
Age: 65
End: 2025-06-20

## 2025-06-20 ENCOUNTER — CLINICAL DOCUMENTATION (OUTPATIENT)
Age: 65
End: 2025-06-20

## 2025-06-20 VITALS
WEIGHT: 129.3 LBS | OXYGEN SATURATION: 99 % | BODY MASS INDEX: 20.25 KG/M2 | DIASTOLIC BLOOD PRESSURE: 85 MMHG | SYSTOLIC BLOOD PRESSURE: 135 MMHG | HEART RATE: 71 BPM | TEMPERATURE: 98.5 F

## 2025-06-20 DIAGNOSIS — C32.1 MALIGNANT NEOPLASM OF SUPRAGLOTTIS (HCC): Primary | ICD-10-CM

## 2025-06-20 DIAGNOSIS — Z51.11 CHEMOTHERAPY MANAGEMENT, ENCOUNTER FOR: ICD-10-CM

## 2025-06-20 RX ORDER — DIPHENHYDRAMINE HYDROCHLORIDE 50 MG/ML
50 INJECTION, SOLUTION INTRAMUSCULAR; INTRAVENOUS
OUTPATIENT
Start: 2025-06-24

## 2025-06-20 RX ORDER — ACETAMINOPHEN 325 MG/1
650 TABLET ORAL
OUTPATIENT
Start: 2025-06-24

## 2025-06-20 RX ORDER — 0.9 % SODIUM CHLORIDE 0.9 %
1000 INTRAVENOUS SOLUTION INTRAVENOUS ONCE
Status: DISCONTINUED | OUTPATIENT
Start: 2025-06-20 | End: 2025-06-21 | Stop reason: HOSPADM

## 2025-06-20 RX ORDER — SODIUM CHLORIDE 9 MG/ML
5-250 INJECTION, SOLUTION INTRAVENOUS PRN
OUTPATIENT
Start: 2025-06-24

## 2025-06-20 RX ORDER — HEPARIN 100 UNIT/ML
500 SYRINGE INTRAVENOUS PRN
Status: DISCONTINUED | OUTPATIENT
Start: 2025-06-20 | End: 2025-06-21 | Stop reason: HOSPADM

## 2025-06-20 RX ORDER — SODIUM CHLORIDE 0.9 % (FLUSH) 0.9 %
5-40 SYRINGE (ML) INJECTION PRN
OUTPATIENT
Start: 2025-06-24

## 2025-06-20 RX ORDER — ALBUTEROL SULFATE 90 UG/1
4 INHALANT RESPIRATORY (INHALATION) PRN
OUTPATIENT
Start: 2025-06-24

## 2025-06-20 RX ORDER — SODIUM CHLORIDE 9 MG/ML
INJECTION, SOLUTION INTRAVENOUS CONTINUOUS
OUTPATIENT
Start: 2025-06-24

## 2025-06-20 RX ORDER — HYDROCORTISONE SODIUM SUCCINATE 100 MG/2ML
100 INJECTION INTRAMUSCULAR; INTRAVENOUS
OUTPATIENT
Start: 2025-06-24

## 2025-06-20 RX ORDER — ONDANSETRON 2 MG/ML
8 INJECTION INTRAMUSCULAR; INTRAVENOUS
OUTPATIENT
Start: 2025-06-24

## 2025-06-20 RX ORDER — 0.9 % SODIUM CHLORIDE 0.9 %
1000 INTRAVENOUS SOLUTION INTRAVENOUS ONCE
Start: 2025-06-24 | End: 2025-06-24

## 2025-06-20 RX ORDER — FAMOTIDINE 10 MG/ML
20 INJECTION, SOLUTION INTRAVENOUS
OUTPATIENT
Start: 2025-06-24

## 2025-06-20 RX ORDER — EPINEPHRINE 1 MG/ML
0.3 INJECTION, SOLUTION, CONCENTRATE INTRAVENOUS PRN
OUTPATIENT
Start: 2025-06-24

## 2025-06-20 RX ORDER — SODIUM CHLORIDE 0.9 % (FLUSH) 0.9 %
5-40 SYRINGE (ML) INJECTION PRN
Status: DISCONTINUED | OUTPATIENT
Start: 2025-06-20 | End: 2025-06-21 | Stop reason: HOSPADM

## 2025-06-20 RX ORDER — HEPARIN 100 UNIT/ML
500 SYRINGE INTRAVENOUS PRN
OUTPATIENT
Start: 2025-06-24

## 2025-06-20 NOTE — PROGRESS NOTES
[facility]- MEDICAL NUTRITION THERAPY     Visit Type: Follow-up     NUTRITION RECOMMENDATIONS / MONITORING / EVALUATION  Continue Regular diet as tolerated.  Continue to flush PEG with water daily until able to remove.   Will continue to monitor PO tolerance, adequacy of intake, weight and care plans    Subjective/Current Data:  Matt Mao is a 64 y.o. 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 Chart reviewed and met with patient prior to visit with physician. Pt states he is doing very well; voice is back, tolerating a regular diet/eating very well. States he was able to resume full oral diet approx 1 week ago. Pt states he no longer uses PEG for feeds, but is still flushing with water. Pt states he can't wait to have tube removed. Pt is disappointed he hasn't gained weight with as much as he has eaten over the past week. Today's weight is 129 lb.     Objective Data:  Patient Active Problem List    Diagnosis Date Noted    Dehydration 05/27/2025    Malignant neoplasm of supraglottis (HCC) 03/13/2025    Laryngeal mass 02/21/2025     Anthropometric Measures:  Height: 5' 6\"  Weight: 129 lb 4.8 oz (58.7 kg)  Ideal Body Weight: 142 lb (64.5 kg)  BMI: 20.25 kg/m2 (Normal Weight)    Wt Readings from Last 20 Encounters:   06/20/25 58.7 kg (129 lb 4.8 oz)   06/06/25 58.2 kg (128 lb 6.4 oz)   06/04/25 57.2 kg (126 lb 3.2 oz)   06/03/25 59.4 kg (130 lb 14.4 oz)   05/30/25 58.9 kg (129 lb 12.8 oz)   05/30/25 58.9 kg (129 lb 12.8 oz)   05/27/25 59 kg (130 lb 1.1 oz)   05/23/25 60 kg (132 lb 3.2 oz)   05/21/25 59.2 kg (130 lb 9.6 oz)   05/16/25 60 kg (132 lb 3.2 oz)   05/14/25 59.2 kg (130 lb 9.6 oz)   05/09/25 60.8 kg (134 lb)   05/07/25 59.2 kg (130 lb 9.6 oz)   05/05/25 59.4 kg (131 lb)   05/02/25 59.4 kg (131 lb)   04/30/25 61.1 kg (134 lb 9.6 oz)   04/25/25 61.2 kg (135 lb)   04/23/25 58.9 kg (129 lb 12.8 oz)   04/23/25 58.9 kg (129 lb 12.8

## 2025-06-20 NOTE — TELEPHONE ENCOUNTER
Name: Matt Mao  : 1960  MRN: [id number]    Oncology Navigation Follow-Up Note    Contact Type:  Medical Oncology    Notes: Pt @ [facility] for Dr. Mota f/u.  Met w/pt prior to f/u.  Pt denied questions/concerns.  Encouraged to contact writer prn.  Will continue to follow.      Electronically signed by Ember Dillon RN on 2025 at 1:28 PM

## 2025-06-20 NOTE — PROGRESS NOTES
Matt Mao                                                                                                                  6/20/2025  MRN:   [id number]  YOB: 1960  PCP:                           Nolan Felix PA  Referring Physician: No ref. provider found  Treating Physician Name: SHANDA BRADY MD      Reason for visit:  Chief Complaint   Patient presents with    Follow-up     Review status of disease    Discuss Labs     Current problems:  Invasive keratinizing squamous cell carcinoma of supraglottic larynx, clinical stage T3 N1 M0  Hoarseness of voice  Tobacco dependence alcohol dependence  Peripheral arterial disease  COPD    Active and recent treatments:  Concurrent chemoradiation with cisplatin-4/2025-6/2025    Interval history:  Presents to the clinic for toxicity check.  Complains of being very weak.  Complains of dizzy when he gets up.  Denies fever or chills.  Pain is improved.  Patient evaluated.  Complains of soreness in the mouth    During this visit patient's allergy, social, medical, surgical history and medications were reviewed and updated.    Summary of Case/History:  Matt Mao a 64 y.o.male is a patient with squamous cell carcinoma of supraglottic larynx presents to the clinic to establish care and for further workup and evaluation.  History of Present Illness    Patient presents to the clinic for follow-up visit and to discuss lab workup and for toxicity check.  Patient complains of constipation but otherwise doing better.    He reports a significant improvement in his condition, with no current pain or discomfort. His appetite has increased, although he expresses dissatisfaction with his weight. He is not currently on any medication regimen. He is not scheduled for a CT scan but plans to consult with his primary care physician. His last radiation therapy session was on 06/02/2025, which was followed by chemotherapy a week later.    He is

## 2025-06-20 NOTE — TELEPHONE ENCOUNTER
Instructions   from Dr. Chin Mota MD    Scan in the last week of August   Rv 1st week of latasha with labs   D/c fluids          PET 8/29/25 at 10:30 am with arrival time of 10 am  RV 9/5/25 at 12:45 pm  Message routed to ARCHANA Scott, to set up transportation.

## 2025-06-24 ENCOUNTER — HOSPITAL ENCOUNTER (OUTPATIENT)
Dept: PHYSICAL THERAPY | Facility: CLINIC | Age: 65
Setting detail: THERAPIES SERIES
Discharge: HOME | End: 2025-06-24
Payer: MEDICARE

## 2025-06-24 PROCEDURE — 97110 THERAPEUTIC EXERCISES: CPT

## 2025-06-24 PROCEDURE — 97140 MANUAL THERAPY 1/> REGIONS: CPT

## 2025-06-24 NOTE — FLOWSHEET NOTE
[] [facility]  Outpatient Rehabilitation &  Therapy  [address]  [phone]  [phone] [] [facility]  Outpatient Rehabilitation &  Therapy  [address]  P: [phone]  F: [phone] [x] [facility]  Outpatient Rehabilitation &  Therapy  [address]  P: [phone]  F: [phone] [] [facility]  Outpatient Rehabilitation &  Therapy  [address]  [phone]  [phone] [] [facility]  Outpatient Rehabilitation &  Therapy  [address]   P: [phone]  F: [phone]  [] [facility]  Outpatient Rehabilitation &  Therapy  [address]  P: [phone]  F: [phone] [] [facility]  Outpatient Rehabilitation &  Therapy  [address]  P: [phone]  F: [phone] [] [facility]  Outpatient Rehabilitation &  Therapy  [address]  P: [phone]  F: [phone] [] [facility]  Outpatient Rehabilitation &  Therapy  [address]  P: [phone]  F: [phone]  [] [facility] Outpatient Rehabilitation &  Therapy  [address]  P: [phone]  F: [phone]     Physical Therapy Daily Treatment Note    Date:  2025  Patient Name:  Matt Mao    :  1960  MRN: [id number]  Physician: Shelley Finnegan          Insurance: [facility] MEDICARE DUAL, [facility] Medicaid visits BMN  Medical Diagnosis: C32.1 Malignant neoplasm of supraglottis         Rehab Codes:  25.611, 25.612, R53.0, L90.5, R29.3   Onset date: 3/14/25 script                Next Dr's appt.: ongoing     Visit# / total visits: ; Progress note for Medicare patient due at visit 10     Cancels/No Shows: 0    Subjective:  Ended chemo and radiation since last visit. Pt arrives stating he is no longer having dizzy spells and has

## 2025-06-26 PROBLEM — E86.0 DEHYDRATION: Status: RESOLVED | Noted: 2025-05-27 | Resolved: 2025-06-26

## 2025-07-06 NOTE — OP NOTE
General mask inhalational anesthesia was induced by the anesthesiology team.   Proper surgeon-initiated time-out was performed.  Once an adequate level of anesthesia was achieved, the anesthesia team used a glide scope to assess the supraglottic tumor.  Given growth since last appointment, decision was made to return the bed 90 degrees and the patient was properly positioned for the procedure with placement of a maxillary tooth guard.      The operative laryngoscope was used to perform direct laryngoscopy and the oral cavity, oropharynx, supraglottis, glottis, and subglottic were evaluated systemically. A rigid 0-degree Moy luisa telescope was used to visualize the larynx and the immediate subglottis (with high definition magnification).  A 5 cuffed ETT was then placed through the vocal folds and safely into the trachea by myself atraumatically.  Photodocumentation was achieved using the digital image capture system and findings were as described above.     Attention was then directed to the area of concern at the left supraglottis. Cupped forceps were used to take multiple biopsies that were sent for frozen suction analysis and confirmed squamous cell carcinoma. Hemostasis was achieved with Afrin soaked pledgets and laryngeal suction cautery, after ensuring FiO2 was below 30%. The operative site was irrigated and suctioned and the patient was turned back to anesthesia for emergence after final confirmation of hemostasis    I was present for and directly performed the entire procedure.    Reginald Larsen MD  Otolaryngology - Head and Neck Surgery  [facility]          ESRD, nausea, abd pain

## 2025-07-08 ENCOUNTER — TELEPHONE (OUTPATIENT)
Age: 65
End: 2025-07-08

## 2025-07-08 NOTE — TELEPHONE ENCOUNTER
Patient called stating he has appointments coming up on 7/10.  set up rides through insurance provider. [facility] then physical therapy appointment before returning home.     Transportation details:  [place] [phone]   9:45am  Confirmation #[id number]  Call for return ride

## 2025-07-10 ENCOUNTER — TELEPHONE (OUTPATIENT)
Age: 65
End: 2025-07-10

## 2025-07-10 ENCOUNTER — HOSPITAL ENCOUNTER (OUTPATIENT)
Dept: RADIATION ONCOLOGY | Age: 65
Discharge: HOME | End: 2025-07-10
Payer: MEDICARE

## 2025-07-10 ENCOUNTER — HOSPITAL ENCOUNTER (OUTPATIENT)
Dept: PHYSICAL THERAPY | Facility: CLINIC | Age: 65
Setting detail: THERAPIES SERIES
Discharge: HOME | End: 2025-07-10
Payer: MEDICARE

## 2025-07-10 VITALS
RESPIRATION RATE: 16 BRPM | DIASTOLIC BLOOD PRESSURE: 67 MMHG | OXYGEN SATURATION: 100 % | HEART RATE: 105 BPM | TEMPERATURE: 97.8 F | WEIGHT: 124 LBS | SYSTOLIC BLOOD PRESSURE: 158 MMHG | BODY MASS INDEX: 19.42 KG/M2

## 2025-07-10 DIAGNOSIS — C32.1 MALIGNANT NEOPLASM OF SUPRAGLOTTIS (HCC): Primary | ICD-10-CM

## 2025-07-10 PROCEDURE — 97110 THERAPEUTIC EXERCISES: CPT

## 2025-07-10 PROCEDURE — 99212 OFFICE O/P EST SF 10 MIN: CPT | Performed by: RADIOLOGY

## 2025-07-10 NOTE — TELEPHONE ENCOUNTER
received referral from Radiation Nurse stating patient asking about food pantries.  sent patient a list and contact number for further information.

## 2025-07-10 NOTE — FLOWSHEET NOTE
[] [facility]  Outpatient Rehabilitation &  Therapy  [address]  [phone]  [phone] [] [facility]  Outpatient Rehabilitation &  Therapy  [address]  P: [phone]  F: [phone] [x] [facility]  Outpatient Rehabilitation &  Therapy  [address]  P: [phone]  F: [phone] [] [facility]  Outpatient Rehabilitation &  Therapy  [address]  [phone]  [phone] [] [facility]  Outpatient Rehabilitation &  Therapy  [address]   P: [phone]  F: [phone]  [] [facility]  Outpatient Rehabilitation &  Therapy  [address]  P: [phone]  F: [phone] [] [facility]  Outpatient Rehabilitation &  Therapy  [address]  P: [phone]  F: [phone] [] [facility]  Outpatient Rehabilitation &  Therapy  [address]  P: [phone]  F: [phone] [] [facility]  Outpatient Rehabilitation &  Therapy  [address]  P: [phone]  F: [phone]  [] [facility] Outpatient Rehabilitation &  Therapy  [address]  P: [phone]  F: [phone]     Physical Therapy Daily Treatment Note    Date:  7/10/2025  Patient Name:  Matt Mao    :  1960  MRN: [id number]  Physician: Shelley Finnegan          Insurance: [facility] MEDICARE DUAL, [facility] Medicaid visits BMN  Medical Diagnosis: C32.1 Malignant neoplasm of supraglottis         Rehab Codes:  25.611, 25.612, R53.0, L90.5, R29.3   Onset date: 3/14/25 script                Next Dr's appt.: ongoing     Visit# / total visits: 10/18; Progress note for Medicare patient due at visit 10     Cancels/No Shows: 0    Subjective:  Pt arrives stating the H&N is doing very well, \"I don't think we need to work on that anymore\". Biggest

## 2025-07-10 NOTE — PROGRESS NOTES
[facility]            Radiation Oncology          [address] [place] [address]        O: [phone]        F: [phone]       [email or web address]           Date of Service: 7/10/2025     Location:  [facility] Radiation Oncology,   [address] [place] [id number] [phone]       RADIATION ONCOLOGY FOLLOW UP NOTE    Patient ID:   Matt Mao  : 1960   MRN: [id number]    DIAGNOSIS:  Squamous cell carcinoma of the supraglottic larynx T2 N1 M0     HISTORY OF PRESENT ILLNESS:   Matt Mao is a 64 y.o. male with history of alcohol and tobacco usage, was reporting voice hoarseness, was seen by ENT and underwent an examination which demonstrated a mass involving the left supra glottic larynx as well as involvement of the left true vocal cord, a biopsy was performed and was positive for invasive squamous cell carcinoma.  Patient had staging which included an FDG PET scan which demonstrated an FDG avid mass in the left supraglottic region as well as have focal activity and left level 4 lymph node suspicious for metastatic disease.  Patient was treated with a definitive course of cisplatin and EBRT completed in 2025.  He is here for posttreatment follow-up.    Patient is recovering well.  He is no longer using his feeding tube.  He is tolerating oral diet and maintaining his weight.  He continues to report xerostomia and has poor taste.  He denies trismus, earaches.  Voice hoarseness improving.  Denies feeling any lumps or bumps in the neck    MEDICATIONS:    Current Outpatient Medications:     sucralfate (CARAFATE) 1 GM tablet, Take 1 tablet by mouth 4 times daily (Patient not taking: Reported on 6/3/2025), Disp: 120 tablet, Rfl: 3    pantoprazole (PROTONIX) 40 MG tablet, Take 1 tablet by mouth daily (Patient not taking: Reported on 6/3/2025), Disp: 30 tablet, Rfl: 1    ondansetron (ZOFRAN-ODT) 8 MG TBDP disintegrating

## 2025-07-10 NOTE — PROGRESS NOTES
Matt Mao  7/10/2025  10:10 AM      Vitals:    07/10/25 0949   BP: (!) 158/67   Pulse: (!) 105   Resp: 16   Temp: 97.8 °F (36.6 °C)   SpO2: 100%    :     Pain Assessment: None - Denies Pain          Wt Readings from Last 1 Encounters:   07/10/25 56.2 kg (124 lb)                Current Outpatient Medications:     sucralfate (CARAFATE) 1 GM tablet, Take 1 tablet by mouth 4 times daily (Patient not taking: Reported on 6/3/2025), Disp: 120 tablet, Rfl: 3    pantoprazole (PROTONIX) 40 MG tablet, Take 1 tablet by mouth daily (Patient not taking: Reported on 6/3/2025), Disp: 30 tablet, Rfl: 1    ondansetron (ZOFRAN-ODT) 8 MG TBDP disintegrating tablet, Take 0.5 tablets by mouth every 8 hours as needed for Nausea or Vomiting, Disp: 90 tablet, Rfl: 3    betamethasone valerate (VALISONE) 0.1 % cream, Apply topically 2 times daily., Disp: 45 g, Rfl: 2    Garlic 100 MG TABS, Take 100 mg by mouth 2 times daily Last dose Monday 2/24/25 (Patient not taking: Reported on 6/3/2025), Disp: , Rfl:     famotidine (PEPCID) 40 MG tablet, Take 1 tablet by mouth daily (Patient not taking: Reported on 6/3/2025), Disp: , Rfl:     acetaminophen (TYLENOL) 325 MG tablet, Take 2 tablets by mouth every 6 hours as needed for Pain Alternate with Motrin so that you are taking something for pain every 3 hours (Patient not taking: Reported on 6/3/2025), Disp: 120 tablet, Rfl: 1    amLODIPine (NORVASC) 5 MG tablet, Take 2 tablets by mouth daily (Patient not taking: Reported on 6/20/2025), Disp: , Rfl:     lisinopril (PRINIVIL;ZESTRIL) 5 MG tablet, Take 1 tablet by mouth daily (Patient not taking: Reported on 6/3/2025), Disp: , Rfl:     Magic Mouthwash (MIRACLE MOUTHWASH), Swish and swallow 5 mLs 3 times daily as needed for Pain Please refer to pharmacist recommendations for concentrations of above additives.  I defer this decision to the practicing pharmacist (Patient not taking: Reported on 6/3/2025), Disp: 473 mL, Rfl: 1    aspirin (DEIRDRE

## 2025-07-15 ENCOUNTER — TELEPHONE (OUTPATIENT)
Age: 65
End: 2025-07-15

## 2025-07-15 NOTE — TELEPHONE ENCOUNTER
Name: Matt Mao  : 1960  MRN: [id number]    Oncology Navigation Follow-Up Note    Contact Type:  Telephone    Notes: Pt called in stating completed Dr. Ramos f/u today & no cancer noted during laryngoscope.  Pt inquired on removal of PEG & port.  Noted referral to  IR for port removal placed 7/10 by Dr. Finnegan.  Instructed pt Dr. Mota typically waits outcome of post tx PET/CT scan prior to removal of port.  Pt verbalized understanding & agreeable.  Instructed pt  IR  will contact to schedule PEG removal.  Pt requested  leave VM if unable to answer.  Pt denied questions/concerns.  Will continue to follow.      Electronically signed by Ember Dillon RN on 7/15/2025 at 1:09 PM

## 2025-07-15 NOTE — TELEPHONE ENCOUNTER
set up rides for 7/22 and 7/29 through insurance provider. Patient updated.    Transportation details:  [place] [phone]   10:00am 1:00pm  Confirmation #[id number]  Return ride 12:15pm 3:15pm

## 2025-07-16 ENCOUNTER — TELEPHONE (OUTPATIENT)
Age: 65
End: 2025-07-16

## 2025-07-16 NOTE — TELEPHONE ENCOUNTER
Patient called to confirm upcoming appointments and transportation.  reviewed rides for 7/22 and 7/29. Patient states waiting on call to schedule tube removal.  will continue to follow.

## 2025-07-22 ENCOUNTER — TELEPHONE (OUTPATIENT)
Age: 65
End: 2025-07-22

## 2025-07-22 ENCOUNTER — HOSPITAL ENCOUNTER (OUTPATIENT)
Dept: PHYSICAL THERAPY | Facility: CLINIC | Age: 65
Setting detail: THERAPIES SERIES
Discharge: HOME | End: 2025-07-22
Payer: MEDICARE

## 2025-07-22 PROCEDURE — 97110 THERAPEUTIC EXERCISES: CPT

## 2025-07-22 NOTE — TELEPHONE ENCOUNTER
Patient called stating  [facility] at [facility] not physical [facility] location.  called insurance provider who states correct address entered into system. New ride to take patient from [facility] to PT scheduled. Patient updated.

## 2025-07-22 NOTE — FLOWSHEET NOTE
[] [facility]  Outpatient Rehabilitation &  Therapy  [address]  [phone]  [phone] [] [facility]  Outpatient Rehabilitation &  Therapy  [address]  P: [phone]  F: [phone] [x] [facility]  Outpatient Rehabilitation &  Therapy  [address]  P: [phone]  F: [phone] [] [facility]  Outpatient Rehabilitation &  Therapy  [address]  [phone]  [phone] [] [facility]  Outpatient Rehabilitation &  Therapy  [address]   P: [phone]  F: [phone]  [] [facility]  Outpatient Rehabilitation &  Therapy  [address]  P: [phone]  F: [phone] [] [facility]  Outpatient Rehabilitation &  Therapy  [address]  P: [phone]  F: [phone] [] [facility]  Outpatient Rehabilitation &  Therapy  [address]  P: [phone]  F: [phone] [] [facility]  Outpatient Rehabilitation &  Therapy  [address]  P: [phone]  F: [phone]  [] [facility] Outpatient Rehabilitation &  Therapy  [address]  P: [phone]  F: [phone]     Physical Therapy Daily Treatment Note    Date:  2025  Patient Name:  Matt Mao    :  1960  MRN: [id number]  Physician: Shelley Finnegan          Insurance: [facility] MEDICARE DUAL, [facility] [place] Medicaid visits BMN  Medical Diagnosis: C32.1 Malignant neoplasm of supraglottis         Rehab Codes:  25.611, 25.612, R53.0, L90.5, R29.3   Onset date: 3/14/25 script                Next Dr's appt.: ongoing     Visit# / total visits: ; Progress note for Medicare patient due at visit 20    Cancels/No Shows: 0    Subjective:  Pt states his legs cont to be very sore. Can only walk \"down to the alley and back\" due to sig LE fatigue.

## 2025-07-24 ENCOUNTER — HOSPITAL ENCOUNTER (OUTPATIENT)
Dept: INTERVENTIONAL RADIOLOGY/VASCULAR | Age: 65
Discharge: HOME | End: 2025-07-26

## 2025-07-24 DIAGNOSIS — C32.1 MALIGNANT NEOPLASM OF SUPRAGLOTTIS (HCC): ICD-10-CM

## 2025-07-28 NOTE — PROGRESS NOTES
PALLIATIVE CARE PROGRESS NOTE     Patient: Matt Mao  1960    Reason For Consult:  Goals of care evaluation  Distress management  Symptom Management  Guidance and support  Facilitate communications  Assistance in coordinating care  Recommendations for the above    Subjective: Matt Mao is a 64 y.o. male with a history of laryngeal mass, COPD, blood clots, HTN, PAD s/p surgery, and follows with Nolan Felix PA .  Palliative Care was consulted to help manage symptoms, facilitate communications and establish goals of care. Patient began symptoms of sore throat and hoarseness of voice. He was seen by PCP and referred to ENT. He had insurance issues and wasn't able to get in until 3/4/2025. Laryngoscopy showed supraglottic mass which was biopsied and came back SCC. He has history of alcohol and tobacco usage. He underwent PET scan that showed FDG avid mass in left supraglottic region as well as having focal activity and left level 4 lymph node suspicious for metastatic disease. Not was made for uptake in the distal esophagus and EGD recommended. Patient seen Dr. Hamlin on 3/11/25 for stage III disease and he was advised to proceed with definitive chemoradiation therapy and continues.      He was started on concurrent chemoradiation wit cisplatin 4/2025. Patent was seen by GI 4/7/25 and he refused EGD. He was seen in ER for lightheadedness 5/27 and has since been set up with hydration appointments.     OPIOID RISK TOOL  Bam each Item Score Item Score  box that applies If Female If Male    1. Family History of Substance Abuse   Alcohol [0 ] Female 1 Male 3  Illegal Drugs [0 ] Female 2 Male 3  Prescription Drugs [ 0] Female 4 Male 4     2. Personal History of Substance Abuse   Alcohol [3 ] Female 3 Male 3  Illegal Drugs [0 ] Female 4 Male 4  Prescription Drugs [0 ] Female 5 Male 5    3. Age (Bam box if 16 - 45 years old) [0 ] Female 1 Male 1    4. History of Preadolescent Sexual Abuse [ ]

## 2025-07-29 ENCOUNTER — CLINICAL DOCUMENTATION (OUTPATIENT)
Age: 65
End: 2025-07-29

## 2025-07-29 ENCOUNTER — OFFICE VISIT (OUTPATIENT)
Dept: PALLATIVE CARE | Age: 65
End: 2025-07-29
Payer: MEDICARE

## 2025-07-29 ENCOUNTER — TELEPHONE (OUTPATIENT)
Age: 65
End: 2025-07-29

## 2025-07-29 VITALS
BODY MASS INDEX: 20.78 KG/M2 | OXYGEN SATURATION: 94 % | HEART RATE: 75 BPM | HEIGHT: 66 IN | DIASTOLIC BLOOD PRESSURE: 60 MMHG | WEIGHT: 129.3 LBS | RESPIRATION RATE: 18 BRPM | SYSTOLIC BLOOD PRESSURE: 135 MMHG

## 2025-07-29 DIAGNOSIS — R53.1 GENERALIZED WEAKNESS: ICD-10-CM

## 2025-07-29 DIAGNOSIS — Z51.5 PALLIATIVE CARE ENCOUNTER: ICD-10-CM

## 2025-07-29 DIAGNOSIS — J38.7 LARYNGEAL MASS: Primary | ICD-10-CM

## 2025-07-29 PROCEDURE — 1123F ACP DISCUSS/DSCN MKR DOCD: CPT | Performed by: NURSE PRACTITIONER

## 2025-07-29 PROCEDURE — 99212 OFFICE O/P EST SF 10 MIN: CPT | Performed by: NURSE PRACTITIONER

## 2025-07-29 PROCEDURE — G8420 CALC BMI NORM PARAMETERS: HCPCS | Performed by: NURSE PRACTITIONER

## 2025-07-29 PROCEDURE — 3017F COLORECTAL CA SCREEN DOC REV: CPT | Performed by: NURSE PRACTITIONER

## 2025-07-29 PROCEDURE — 1036F TOBACCO NON-USER: CPT | Performed by: NURSE PRACTITIONER

## 2025-07-29 PROCEDURE — G8427 DOCREV CUR MEDS BY ELIG CLIN: HCPCS | Performed by: NURSE PRACTITIONER

## 2025-07-29 NOTE — TELEPHONE ENCOUNTER
Name: Matt Mao  : 1960  MRN: [id number]    Oncology Navigation Follow-Up Note    Contact Type:  Medical Oncology    Notes: Pt @ [facility] for palliative care appt.  Met w/pt prior to appt.  Pt stated completed PEG removal.  Pt denied questions/concerns.  Will continue to follow.      Electronically signed by Ember Dillon RN on 2025 at 2:14 PM

## 2025-07-29 NOTE — TELEPHONE ENCOUNTER
Patient called to confirm transportation for today's appointment.  reviewed ride details. Patient has appointment 8/5, ride scheduled through insurance provider.    Transportation details:  [employer] [phone]   9:00am  Confirmation #[id number]  Return ride 11:15am

## 2025-07-29 NOTE — PROGRESS NOTES
[facility]: Nutrition Note  Writer had planned to see patient for nutrition follow-up after his visit with palliative care NP but patient was in a hurry to leave. Per NP, pt reported good appetite/intake and tolerance to diet, and that PEG was removed (noted removal on 7/24/25). Noted weight has been stable over past several weeks. RD will sign off at this time, but will be available as needed in the future.     Huyen Garcia RD, LD, CNSC  Registered Dietitian  [facility]   [phone]

## 2025-08-05 ENCOUNTER — TELEPHONE (OUTPATIENT)
Age: 65
End: 2025-08-05

## 2025-08-05 ENCOUNTER — HOSPITAL ENCOUNTER (OUTPATIENT)
Dept: PHYSICAL THERAPY | Facility: CLINIC | Age: 65
Setting detail: THERAPIES SERIES
Discharge: HOME | End: 2025-08-05
Payer: MEDICARE

## 2025-08-05 PROCEDURE — 97110 THERAPEUTIC EXERCISES: CPT

## 2025-08-19 ENCOUNTER — TELEPHONE (OUTPATIENT)
Age: 65
End: 2025-08-19

## 2025-08-19 ENCOUNTER — HOSPITAL ENCOUNTER (OUTPATIENT)
Dept: PHYSICAL THERAPY | Facility: CLINIC | Age: 65
Setting detail: THERAPIES SERIES
Discharge: HOME | End: 2025-08-19
Payer: MEDICARE

## 2025-08-21 ENCOUNTER — TELEPHONE (OUTPATIENT)
Age: 65
End: 2025-08-21

## 2025-08-26 ENCOUNTER — HOSPITAL ENCOUNTER (OUTPATIENT)
Dept: PHYSICAL THERAPY | Facility: CLINIC | Age: 65
Setting detail: THERAPIES SERIES
Discharge: HOME | End: 2025-08-26
Payer: MEDICARE

## 2025-08-26 PROCEDURE — 97110 THERAPEUTIC EXERCISES: CPT

## 2025-08-29 ENCOUNTER — HOSPITAL ENCOUNTER (OUTPATIENT)
Dept: NUCLEAR MEDICINE | Age: 65
Discharge: HOME | End: 2025-08-31
Attending: INTERNAL MEDICINE
Payer: MEDICARE

## 2025-08-29 ENCOUNTER — TELEPHONE (OUTPATIENT)
Age: 65
End: 2025-08-29

## 2025-08-29 DIAGNOSIS — Z51.11 CHEMOTHERAPY MANAGEMENT, ENCOUNTER FOR: ICD-10-CM

## 2025-08-29 DIAGNOSIS — C32.1 MALIGNANT NEOPLASM OF SUPRAGLOTTIS (HCC): ICD-10-CM

## 2025-08-29 LAB — GLUCOSE BLD-MCNC: 103 MG/DL (ref 75–110)

## 2025-08-29 PROCEDURE — 3430000000 HC RX DIAGNOSTIC RADIOPHARMACEUTICAL: Performed by: INTERNAL MEDICINE

## 2025-08-29 PROCEDURE — 78815 PET IMAGE W/CT SKULL-THIGH: CPT

## 2025-08-29 PROCEDURE — 82947 ASSAY GLUCOSE BLOOD QUANT: CPT

## 2025-08-29 PROCEDURE — 2500000003 HC RX 250 WO HCPCS: Performed by: INTERNAL MEDICINE

## 2025-08-29 PROCEDURE — A9609 HC RX DIAGNOSTIC RADIOPHARMACEUTICAL: HCPCS | Performed by: INTERNAL MEDICINE

## 2025-08-29 RX ORDER — FLUDEOXYGLUCOSE F 18 200 MCI/ML
10 INJECTION, SOLUTION INTRAVENOUS
Status: COMPLETED | OUTPATIENT
Start: 2025-08-29 | End: 2025-08-29

## 2025-08-29 RX ORDER — SODIUM CHLORIDE 0.9 % (FLUSH) 0.9 %
10 SYRINGE (ML) INJECTION PRN
Status: DISCONTINUED | OUTPATIENT
Start: 2025-08-29 | End: 2025-09-01 | Stop reason: HOSPADM

## 2025-08-29 RX ADMIN — FLUDEOXYGLUCOSE F 18 9.45 MILLICURIE: 200 INJECTION, SOLUTION INTRAVENOUS at 10:30

## 2025-08-29 RX ADMIN — SODIUM CHLORIDE, PRESERVATIVE FREE 10 ML: 5 INJECTION INTRAVENOUS at 10:54

## 2025-09-02 ENCOUNTER — TELEPHONE (OUTPATIENT)
Age: 65
End: 2025-09-02

## 2025-09-04 ENCOUNTER — TELEPHONE (OUTPATIENT)
Age: 65
End: 2025-09-04

## 2025-09-05 ENCOUNTER — HOSPITAL ENCOUNTER (OUTPATIENT)
Dept: RADIATION ONCOLOGY | Age: 65
Discharge: HOME | End: 2025-09-05
Payer: MEDICARE

## 2025-09-05 ENCOUNTER — TELEPHONE (OUTPATIENT)
Age: 65
End: 2025-09-05

## 2025-09-05 VITALS
TEMPERATURE: 98.4 F | DIASTOLIC BLOOD PRESSURE: 72 MMHG | BODY MASS INDEX: 20.71 KG/M2 | HEART RATE: 90 BPM | WEIGHT: 128.3 LBS | OXYGEN SATURATION: 99 % | SYSTOLIC BLOOD PRESSURE: 125 MMHG | RESPIRATION RATE: 16 BRPM

## 2025-09-05 PROCEDURE — 99212 OFFICE O/P EST SF 10 MIN: CPT | Performed by: RADIOLOGY

## 2025-09-05 ASSESSMENT — PAIN SCALES - GENERAL: PAINLEVEL_OUTOF10: 0

## (undated) DEVICE — GAUZE,SPONGE,4"X4",16PLY,XRAY,STRL,LF: Brand: MEDLINE

## (undated) DEVICE — SPONGE,NEURO,0.5"X3",XR,STRL,LF,10/PK: Brand: MEDLINE

## (undated) DEVICE — STRAP,POSITIONING,KNEE/BODY,FOAM,4X60": Brand: MEDLINE

## (undated) DEVICE — INJECTION NEEDLE, STRAIGHT, 23 CM: Brand: N.A.

## (undated) DEVICE — COVER,MAYO STAND,STERILE: Brand: MEDLINE

## (undated) DEVICE — STRAP ARMBRD W1.5XL32IN FOAM STR YET SFT W/ HK AND LOOP

## (undated) DEVICE — DRAPE,REIN 53X77,STERILE: Brand: MEDLINE

## (undated) DEVICE — GLOVE ORANGE PI 8   MSG9080

## (undated) DEVICE — KIT,ANTI FOG,W/SPONGE & FLUID,SOFT PACK: Brand: MEDLINE

## (undated) DEVICE — TOWEL,OR,DSP,ST,NATURAL,DLX,4/PK,20PK/CS: Brand: MEDLINE

## (undated) DEVICE — MARKER,SKIN,WI/RULER AND LABELS: Brand: MEDLINE

## (undated) DEVICE — NEEDLE HYPO 27GA L15IN REG BVL W O SFTY FOR SYR DISPOSABLE

## (undated) DEVICE — TUBING, SUCTION, 9/32" X 20', STRAIGHT: Brand: MEDLINE INDUSTRIES, INC.

## (undated) DEVICE — SYRINGE MED 10ML LUERLOCK TIP W/O SFTY DISP